# Patient Record
Sex: FEMALE | Race: WHITE | NOT HISPANIC OR LATINO | Employment: FULL TIME | URBAN - METROPOLITAN AREA
[De-identification: names, ages, dates, MRNs, and addresses within clinical notes are randomized per-mention and may not be internally consistent; named-entity substitution may affect disease eponyms.]

---

## 2017-03-21 ENCOUNTER — GENERIC CONVERSION - ENCOUNTER (OUTPATIENT)
Dept: OTHER | Facility: OTHER | Age: 57
End: 2017-03-21

## 2017-03-23 ENCOUNTER — ALLSCRIPTS OFFICE VISIT (OUTPATIENT)
Dept: OTHER | Facility: OTHER | Age: 57
End: 2017-03-23

## 2017-03-23 DIAGNOSIS — Z01.419 ENCOUNTER FOR GYNECOLOGICAL EXAMINATION WITHOUT ABNORMAL FINDING: ICD-10-CM

## 2017-04-20 ENCOUNTER — ALLSCRIPTS OFFICE VISIT (OUTPATIENT)
Dept: OTHER | Facility: OTHER | Age: 57
End: 2017-04-20

## 2017-05-04 ENCOUNTER — GENERIC CONVERSION - ENCOUNTER (OUTPATIENT)
Dept: OTHER | Facility: OTHER | Age: 57
End: 2017-05-04

## 2017-05-11 ENCOUNTER — ALLSCRIPTS OFFICE VISIT (OUTPATIENT)
Dept: OTHER | Facility: OTHER | Age: 57
End: 2017-05-11

## 2017-05-23 ENCOUNTER — ALLSCRIPTS OFFICE VISIT (OUTPATIENT)
Dept: OTHER | Facility: OTHER | Age: 57
End: 2017-05-23

## 2017-06-19 ENCOUNTER — ALLSCRIPTS OFFICE VISIT (OUTPATIENT)
Dept: OTHER | Facility: OTHER | Age: 57
End: 2017-06-19

## 2017-06-20 ENCOUNTER — GENERIC CONVERSION - ENCOUNTER (OUTPATIENT)
Dept: OTHER | Facility: OTHER | Age: 57
End: 2017-06-20

## 2017-07-24 ENCOUNTER — GENERIC CONVERSION - ENCOUNTER (OUTPATIENT)
Dept: OTHER | Facility: OTHER | Age: 57
End: 2017-07-24

## 2017-07-25 ENCOUNTER — GENERIC CONVERSION - ENCOUNTER (OUTPATIENT)
Dept: OTHER | Facility: OTHER | Age: 57
End: 2017-07-25

## 2017-08-09 ENCOUNTER — ALLSCRIPTS OFFICE VISIT (OUTPATIENT)
Dept: OTHER | Facility: OTHER | Age: 57
End: 2017-08-09

## 2017-09-06 ENCOUNTER — ALLSCRIPTS OFFICE VISIT (OUTPATIENT)
Dept: OTHER | Facility: OTHER | Age: 57
End: 2017-09-06

## 2017-10-03 ENCOUNTER — GENERIC CONVERSION - ENCOUNTER (OUTPATIENT)
Dept: OTHER | Facility: OTHER | Age: 57
End: 2017-10-03

## 2017-10-31 ENCOUNTER — GENERIC CONVERSION - ENCOUNTER (OUTPATIENT)
Dept: OTHER | Facility: OTHER | Age: 57
End: 2017-10-31

## 2018-01-10 NOTE — PSYCH
Provider Comments  Provider Comments:   Patient cancelled upcoming appointments  I did call - left voice mail      Message   Recorded as Task   Date: 06/20/2017 01:25 PM, Created By: Ton Doyle   Task Name: Document Appointment   Assigned To: Sonja Winters   Regarding Patient: Senthil Mcwilliams, Status: Active   Comment:    Nafisa Cabezas - 20 Jun 2017 1:25 PM     TASK CREATED  Caller: Self; [phone] (Home); [phone] (Work)  EYAD CALLED 6/20/17@ 12:36 AND CANCELED HER   [email or web address]  SHE DID NOT GIVE A REASON  IT WAS LEFT ON OUR PHONE      NAFISA     Signatures   Electronically signed by : Bita Dickey, ; Jun 20 2017  2:00PM EST                       (Author)

## 2018-01-11 NOTE — PSYCH
Message  Message Free Text Note Form: lm at 3: 10 PM today to cancel the Empowerment Group session for tomorrow at 5 PM with no reason given; Active Problems    1  Anxiety (300 00) (F41 9)   2  Depression (311) (F32 9)   3  Encounter for annual routine gynecological examination (V72 31) (Z01 419)   4  High cholesterol (272 0) (E78 00)    Current Meds   1  Calcium 500 500 MG TABS; Therapy: (Sissy Rubio) to Recorded   2  Citalopram Hydrobromide 40 MG Oral Tablet; Therapy: (Sissy Rubio) to Recorded   3  Iron TABS; Therapy: (Sissy Rubio) to Recorded   4  Multi Vitamin Daily TABS; Therapy: (Sissy Rubio) to Recorded   5  TraZODone HCl - 50 MG Oral Tablet; Therapy: (Sissy Rubio) to Recorded   6  Vitamin D3 1000 UNIT Oral Tablet; Therapy: (Recorded:23Mar2017) to Recorded    Allergies    1   No Known Drug Allergies    Signatures   Electronically signed by : ROSA AzulLCSWMSEDUARDO,AMERICO; Oct  3 2017  4:37PM EST                       (Author)

## 2018-01-11 NOTE — PSYCH
Behavioral Health Outpatient Intake    Referred By: Jerri Lugo  Intake Questions: there are no developmental disabilities  the patient does not have a hearing impairment  the patient does not have an ICM or CTT  patient is not taking injectable psychiatric medications  Employment: The patient is employed full time at [employer]  Emergency Contact Information:   Emergency Contact: Bud Metz   Relationship to Patient: MELISA   Phone Number:   Previous Psychiatric Treatment: She has previously been seen by a psychiatrist  Antonina Boothech  She has previously been seen by a therapist  3 YRS AGO   History: no  service  She has not had combat service  She was not activated into federal active duty as a member of the [employer] or [employer]  Insurance Subscriber: [employer]   Primary Insurance: [employer]   ID number: [id number]   Group number: [id number]         Presenting Problem (in patient's words): LIFE CHANGE, RECENTLY , FAMILY ISSUES  Substance Abuse: NONE  Previous Treatment: The patient has not been seen here in the past      Accepted as Patient   YUMIKO SEVERINO 4/20/17 @ 9:00     Primary Care Physician: DR Junior Score       Signatures   Electronically signed by : Nando Champagne, ; Mar 21 2017  9:19AM EST                       (Author)

## 2018-01-11 NOTE — PSYCH
Treatment Plan Tracking    #1 Treatment Plan not completed within required time limits due to: Client cancelled/ no-showed scheduled appointment            Signatures   Electronically signed by : Real Garvin, ; Jun 20 2017  1:59PM EST                       (Author)
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
## 2018-01-12 NOTE — PSYCH
Message  Message Free Text Note Form: lm today at 8:41 AM to cance all of her Empowerment Group sessions stating she no longer wishes to participate; Active Problems    1  Anxiety (300 00) (F41 9)   2  Depression (311) (F32 9)   3  Encounter for annual routine gynecological examination (V72 31) (Z01 419)   4  High cholesterol (272 0) (E78 00)    Current Meds   1  Calcium 500 500 MG TABS; Therapy: (Macel Knee) to Recorded   2  Citalopram Hydrobromide 40 MG Oral Tablet; Therapy: (Macel Knee) to Recorded   3  Iron TABS; Therapy: (Macel Knee) to Recorded   4  Multi Vitamin Daily TABS; Therapy: (Macel Knee) to Recorded   5  TraZODone HCl - 50 MG Oral Tablet; Therapy: (Macel Knee) to Recorded   6  Vitamin D3 1000 UNIT Oral Tablet; Therapy: (Recorded:23Mar2017) to Recorded    Allergies    1   No Known Drug Allergies    Signatures   Electronically signed by : Ponce Dancer, MSWLCSWMSW,AUSTINW; Oct 31 2017  9:13AM EST                       (Author)

## 2018-01-12 NOTE — PSYCH
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  Still drinking but working on cutting back  We talked about her feelings of "relief" and how she often drinks so that she can let herself feel her feelings  Discussed healthier outlets and patterns for expression of self  A: Seems to understand her need for nurturing however sees this at times as "selfish"  Proud of herself for things she is doing for herself now  P: See PT as prescribed for compressed disc in neck  letter to son, journaling nightly  Time with friends  Permission to nurture self  Pain Scale and Suicide Risk [facility]: Current Pain Assessment: moderate to severe   On a scale of 0 to 10, the patient rates current pain at 4   Current suicide risk is low   Behavioral Health Treatment Plan [facility]: Diagnosis and Treatment Plan explained to patient, patient relates understanding diagnosis and is agreeable to Treatment Plan  Assessment    1   Anxiety (300 00) (F41 9)    Plan  see in 2 weeks     Signatures   Electronically signed by : Tia Lara, ; May 23 2017  9:55AM EST                       (Author)

## 2018-01-12 NOTE — PSYCH
Message   Recorded as Task   Date: 07/24/2017 08:49 AM, Created By: Amanda Arshad   Task Name: Document Appointment   Assigned To:  Chet Wheat   Regarding Patient: Dirk Glover, Status: Active   CommentMacie [facility] - 24 Jul 2017 8:49 AM     TASK CREATED  Caller: Self; Other; [phone] (Home); [phone] (Work)  PT CALLED TO [employer] HER APPT NO REASON WAS GIVING     Signatures   Electronically signed by : Srini Manley, ; Jul 24 2017  9:09AM EST                       (Author)

## 2018-01-14 VITALS
SYSTOLIC BLOOD PRESSURE: 104 MMHG | DIASTOLIC BLOOD PRESSURE: 60 MMHG | HEIGHT: 64 IN | WEIGHT: 164.5 LBS | BODY MASS INDEX: 28.09 KG/M2

## 2018-01-16 NOTE — PSYCH
Progress Note  Psychotherapy Provided [facility]: Group Therapy provided today  Goals addressed in session:   (G#1 ) Charlotte attended her first Women's Group session today  attended the group today  When discussing three things about herself,s he identified being being  for three years following a 32 yr  marriage with three adult sons  She briefly commented on a "difficulty" with her one son and noted her decision to "wait" and have a conversation with him in a few days "to give me time to think about it (the situation between them)  When identifying one thing about herself for which she is more focused on assuming responsibility, she identified expanding her friendship network  A: Eye contact was consistent with the participants and with the group facilitator  She verbally responded to participants' input as well as to the facilitator's input without prompting   She smiled, laughed and became tearful during the session  session  P: G#1) will continue with group participation to help her deal with her identified concern of loneliness;   Pain Scale and Suicide Risk [facility]: On a scale of 0 to 10, the patient rates current pain at 0   Current suicide risk is low   Behavioral Health Treatment Plan [facility]: Diagnosis and Treatment Plan explained to patient, patient relates understanding diagnosis and is agreeable to Treatment Plan  Assessment    1  Anxiety (300 00) (F41 9)   2   Depression (311) (F32 9)    Signatures   Electronically signed by : BRIJESH Cyr,AMERICO; Aug 10 2017  1:55PM EST                       (Author)

## 2018-01-16 NOTE — PSYCH
Progress Note  Psychotherapy Provided [facility]: Group Therapy provided today  Goals addressed in session:   (G# 1 ) Charlotte attended her first session of the Empowerment Group today changing from the Women's Group which meets twice/month vs the Empowerment Group meeting of 1x/month  When asked to share three things about herself, she shared her being in transition with her single life, her exploring job options with her newly acquired graduate degree and her struggle with her denise  When asked to identify something positive in her life, she noted her having a "wonderful summer with her activities including some travel  A: Eye contact was consistent with the participants and with the group facilitator  She verbally responded to participants' input as well as to the facilitator's input without prompting  She smiled during the session  P: G#1) will continue with group participation to help her clarify her direction during her transition in her life as a single person;   Pain Scale and Suicide Risk [facility]: On a scale of 0 to 10, the patient rates current pain at 0   Current suicide risk is low   Behavioral Health Treatment Plan [address] [place] [address] [address]: Diagnosis and Treatment Plan explained to patient, patient relates understanding diagnosis and is agreeable to Treatment Plan  Assessment    1  Anxiety (300 00) (F41 9)   2   Depression (311) (F32 9)    Signatures   Electronically signed by : Ruby Butler, MSWLCSWROSA,AUSTINW; Sep  7 2017  2:11PM EST                       (Author)
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
## 2018-01-17 NOTE — PSYCH
Progress Note  Psychotherapy Provided [facility]: Individual Psychotherapy 30 mins  minutes provided today  Goals addressed in session:   (G# 1)came to session for a 30 mins  "meet and Greet" to discuss her participation in either the Empowerment or the Women's Group  She posed questions about the respective areas of focus in each group, degree of compatibility with group participants and discussed the Group Contract  She signed the Contract (received her own copy) and requested to start the Women's Group effective July 12, 2017  She was encouraged to contact this writer (the Group facilitator) any time with questions; discussed/reviewed group process including roles, group contract, including confidentiality and related matters; A: Mai Felt presents as receptive to her participating in the Group, she requested a three session 'trail period " P: (G#1 ) will attend her first Women's Group session , per her request, July 12, 2017  Pain Scale and Suicide Risk [facility]: On a scale of 0 to 10, the patient rates current pain at 0   Current suicide risk is low   Behavioral Health Treatment Plan [facility]: Diagnosis and Treatment Plan explained to patient, patient relates understanding diagnosis and is agreeable to Treatment Plan  Assessment    1  Anxiety (300 00) (F41 9)   2   Depression (311) (F32 9)    Signatures   Electronically signed by : Jaime Andino, MSWLCSWROSA,AMERICO; Jun 20 2017  3:41PM EST                       (Author)

## 2018-05-01 ENCOUNTER — APPOINTMENT (OUTPATIENT)
Dept: LAB | Age: 58
End: 2018-05-01
Attending: PREVENTIVE MEDICINE

## 2018-05-01 ENCOUNTER — TRANSCRIBE ORDERS (OUTPATIENT)
Dept: ADMINISTRATIVE | Age: 58
End: 2018-05-01

## 2018-05-01 DIAGNOSIS — Z02.1 PRE-EMPLOYMENT HEALTH SCREENING EXAMINATION: Primary | ICD-10-CM

## 2018-05-01 DIAGNOSIS — Z02.1 PRE-EMPLOYMENT HEALTH SCREENING EXAMINATION: ICD-10-CM

## 2018-05-01 LAB
HBV SURFACE AB SER-ACNC: <3.1 MIU/ML
RUBV IGG SERPL IA-ACNC: >175 IU/ML

## 2018-05-01 PROCEDURE — 86787 VARICELLA-ZOSTER ANTIBODY: CPT

## 2018-05-01 PROCEDURE — 86735 MUMPS ANTIBODY: CPT

## 2018-05-01 PROCEDURE — 36415 COLL VENOUS BLD VENIPUNCTURE: CPT

## 2018-05-01 PROCEDURE — 86762 RUBELLA ANTIBODY: CPT

## 2018-05-01 PROCEDURE — 86765 RUBEOLA ANTIBODY: CPT

## 2018-05-01 PROCEDURE — 86706 HEP B SURFACE ANTIBODY: CPT

## 2018-05-01 PROCEDURE — 86480 TB TEST CELL IMMUN MEASURE: CPT

## 2018-05-03 LAB
ANNOTATION COMMENT IMP: NORMAL
GAMMA INTERFERON BACKGROUND BLD IA-ACNC: 0.04 IU/ML
M TB IFN-G BLD-IMP: NEGATIVE
M TB IFN-G CD4+ BCKGRND COR BLD-ACNC: 0.01 IU/ML
M TB IFN-G CD4+ T-CELLS BLD-ACNC: 0.05 IU/ML
MEV IGG SER QL: NORMAL
MITOGEN IGNF BLD-ACNC: >10 IU/ML
MUV IGG SER QL: NORMAL
QUANTIFERON-TB GOLD IN TUBE: NORMAL
SERVICE CMNT-IMP: NORMAL
VZV IGG SER IA-ACNC: NORMAL

## 2018-06-20 ENCOUNTER — TRANSCRIBE ORDERS (OUTPATIENT)
Dept: ADMINISTRATIVE | Age: 58
End: 2018-06-20

## 2018-06-20 ENCOUNTER — APPOINTMENT (OUTPATIENT)
Dept: LAB | Age: 58
End: 2018-06-20

## 2018-06-20 DIAGNOSIS — Z01.84 IMMUNITY STATUS TESTING: Primary | ICD-10-CM

## 2018-06-20 DIAGNOSIS — Z01.84 IMMUNITY STATUS TESTING: ICD-10-CM

## 2018-06-20 PROCEDURE — 86706 HEP B SURFACE ANTIBODY: CPT

## 2018-06-20 PROCEDURE — 36415 COLL VENOUS BLD VENIPUNCTURE: CPT

## 2018-06-21 LAB — HBV SURFACE AB SER-ACNC: 328.06 MIU/ML

## 2018-07-05 ENCOUNTER — OFFICE VISIT (OUTPATIENT)
Dept: FAMILY MEDICINE CLINIC | Facility: CLINIC | Age: 58
End: 2018-07-05
Payer: COMMERCIAL

## 2018-07-05 VITALS
SYSTOLIC BLOOD PRESSURE: 116 MMHG | HEART RATE: 80 BPM | BODY MASS INDEX: 27.9 KG/M2 | HEIGHT: 64 IN | WEIGHT: 163.4 LBS | RESPIRATION RATE: 16 BRPM | DIASTOLIC BLOOD PRESSURE: 74 MMHG

## 2018-07-05 DIAGNOSIS — Z00.00 ENCOUNTER FOR WELL ADULT EXAM WITHOUT ABNORMAL FINDINGS: Primary | ICD-10-CM

## 2018-07-05 DIAGNOSIS — Z11.59 ENCOUNTER FOR HEPATITIS C SCREENING TEST FOR LOW RISK PATIENT: ICD-10-CM

## 2018-07-05 DIAGNOSIS — F33.0 MILD EPISODE OF RECURRENT MAJOR DEPRESSIVE DISORDER (HCC): ICD-10-CM

## 2018-07-05 DIAGNOSIS — Z12.31 VISIT FOR SCREENING MAMMOGRAM: ICD-10-CM

## 2018-07-05 PROBLEM — F41.9 ANXIETY: Status: ACTIVE | Noted: 2017-03-23

## 2018-07-05 PROBLEM — F32.A DEPRESSION: Status: ACTIVE | Noted: 2017-03-23

## 2018-07-05 PROCEDURE — 99386 PREV VISIT NEW AGE 40-64: CPT | Performed by: FAMILY MEDICINE

## 2018-07-05 RX ORDER — CITALOPRAM 40 MG/1
TABLET ORAL
COMMUNITY
End: 2018-07-05 | Stop reason: SDUPTHER

## 2018-07-05 RX ORDER — TRAZODONE HYDROCHLORIDE 50 MG/1
50 TABLET ORAL
Qty: 90 TABLET | Refills: 3 | Status: SHIPPED | OUTPATIENT
Start: 2018-07-05 | End: 2019-10-27 | Stop reason: SDUPTHER

## 2018-07-05 RX ORDER — TRAZODONE HYDROCHLORIDE 50 MG/1
50 TABLET ORAL
COMMUNITY
Start: 2014-01-24 | End: 2018-07-05 | Stop reason: SDUPTHER

## 2018-07-05 RX ORDER — IRON,CARBONYL/ASCORBIC ACID 100-250 MG
TABLET ORAL
COMMUNITY
End: 2020-02-17

## 2018-07-05 RX ORDER — TOCOPHERSOLAN (VITAMIN E TPGS) 400/15ML
LIQUID (ML) ORAL
COMMUNITY
End: 2022-01-12

## 2018-07-05 RX ORDER — CITALOPRAM 40 MG/1
40 TABLET ORAL DAILY
Qty: 90 TABLET | Refills: 3 | Status: SHIPPED | OUTPATIENT
Start: 2018-07-05 | End: 2019-07-09 | Stop reason: SDUPTHER

## 2018-07-05 NOTE — PROGRESS NOTES
Harika Guerra was seen today for new patient physcial and medicaton inquiry  Diagnoses and all orders for this visit:    Encounter for well adult exam without abnormal findings  -     Hemoglobin A1C; Future  -     Lipid Panel with Direct LDL reflex; Future    Visit for screening mammogram  -     Mammo screening bilateral w cad; Future    Encounter for hepatitis C screening test for low risk patient  -     Hepatitis C Qualitative by PCR; Future    Mild episode of recurrent major depressive disorder (HCC)  -     traZODone (DESYREL) 50 mg tablet; Take 1 tablet (50 mg total) by mouth daily at bedtime  -     citalopram (CeleXA) 40 mg tablet; Take 1 tablet (40 mg total) by mouth daily      Patient Counseling:  --Nutrition: Stressed importance of moderation in sodium/caffeine intake, saturated fat and cholesterol, caloric balance, sufficient intake of fresh fruits, vegetables, fiber, calcium, iron, and 1 mg of folate supplement per day   --Discussed  calcium supplement, and the daily use of baby aspirin  --Exercise: Stressed the importance of regular exercise  --Substance Abuse: Discussed cessation/primary prevention of tobacco, alcohol, or other drug use; driving or other dangerous activities under the influence; availability of treatment for abuse  --Sexuality: Discussed sexually transmitted diseases, partner selection, use of condoms, avoidance of unintended pregnancy  and contraceptive alternatives  --Injury prevention: Discussed safety belts, safety helmets, smoke detector, smoking near bedding or upholstery  --Dental health: Discussed importance of regular tooth brushing, flossing, and dental visits  --Immunizations reviewed    --Discussed benefits of screening colonoscopy    Chief Complaint   Patient presents with    new patient physcial    medicaton inquiry       HPI    Patient here for a comprehensive physical exam The patient reports no problems    Do you take any herbs or supplements that were not prescribed by a doctor? no   Are you taking calcium supplements? no   Are you taking aspirin daily? no  How often do you exercise? Diet? 2-3 servings of fruits and vegetables   Dental visit:  Yearly     Vision test: wears glasses   1 year ago     Colonoscopy:  Last year    History:  LMP: No LMP recorded  post menopausal     Last pap date: last year   Abnormal pap? no  :  3  Para: 3          History   Sexual Activity    Sexual activity: Not Currently           Review of Systems   Constitutional: Negative  HENT: Negative  Eyes: Negative  Respiratory: Negative  Cardiovascular: Negative  Gastrointestinal: Negative  Endocrine: Negative  Genitourinary: Negative  Musculoskeletal: Negative  Skin: Negative  Allergic/Immunologic: Negative  Neurological: Negative  Hematological: Negative  Psychiatric/Behavioral: Negative  Family History   Problem Relation Age of Onset   Giovanna Boothe Arthritis Mother     Hypertension Mother     Scoliosis Mother     Hyperlipidemia Mother     Arthritis Father     Heart disease Father         CARDIAC DISORDER    Ovarian cancer Maternal Aunt        Past Medical History:   Diagnosis Date    Known health problems: none          Social History     Social History    Marital status:      Spouse name: N/A    Number of children: 3    Years of education: N/A     Occupational History          FULL-TIME EMPLOYMENT     Social History Main Topics    Smoking status: Former Smoker    Smokeless tobacco: Never Used    Alcohol use Yes      Comment: ACUTE    Drug use: No    Sexual activity: Not Currently     Other Topics Concern    Not on file     Social History Narrative    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, 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    EXERCISES 3 TO 4 TIMES PER WEEK           No current outpatient prescriptions on file prior to visit  No current facility-administered medications on file prior to visit            Allergies   Allergen Reactions  Benzoin     Monistat [Tioconazole] Hives         Vitals:    07/05/18 1202   BP: 116/74   BP Location: Left arm   Patient Position: Sitting   Cuff Size: Standard   Pulse: 80   Resp: 16   Weight: 74 1 kg (163 lb 6 4 oz)   Height: 5' 4" (1 626 m)         Physical Exam   Constitutional: She is oriented to person, place, and time  Vital signs are normal  She appears well-developed and well-nourished  HENT:   Head: Normocephalic and atraumatic  Nose: Nose normal    Mouth/Throat: Oropharynx is clear and moist    Eyes: Pupils are equal, round, and reactive to light  Neck: Normal range of motion  Neck supple  No thyromegaly present  Cardiovascular: Normal rate and regular rhythm  No murmur heard  Pulmonary/Chest: Effort normal and breath sounds normal    Abdominal: Soft  Bowel sounds are normal    Musculoskeletal: Normal range of motion  She exhibits no edema or deformity  Neurological: She is alert and oriented to person, place, and time  She has normal reflexes  Skin: Skin is warm  No rash noted  No erythema  Psychiatric: She has a normal mood and affect   Her behavior is normal

## 2018-08-15 ENCOUNTER — TRANSCRIBE ORDERS (OUTPATIENT)
Dept: ADMINISTRATIVE | Age: 58
End: 2018-08-15

## 2018-08-15 ENCOUNTER — APPOINTMENT (OUTPATIENT)
Dept: LAB | Age: 58
End: 2018-08-15
Payer: COMMERCIAL

## 2018-08-15 DIAGNOSIS — Z00.00 ENCOUNTER FOR WELL ADULT EXAM WITHOUT ABNORMAL FINDINGS: ICD-10-CM

## 2018-08-15 DIAGNOSIS — Z11.59 ENCOUNTER FOR HEPATITIS C SCREENING TEST FOR LOW RISK PATIENT: ICD-10-CM

## 2018-08-15 LAB
CHOLEST SERPL-MCNC: 269 MG/DL (ref 50–200)
EST. AVERAGE GLUCOSE BLD GHB EST-MCNC: 105 MG/DL
HBA1C MFR BLD: 5.3 % (ref 4.2–6.3)
HDLC SERPL-MCNC: 95 MG/DL (ref 40–60)
LDLC SERPL CALC-MCNC: 162 MG/DL (ref 0–100)
TRIGL SERPL-MCNC: 62 MG/DL

## 2018-08-15 PROCEDURE — 87521 HEPATITIS C PROBE&RVRS TRNSC: CPT

## 2018-08-15 PROCEDURE — 80061 LIPID PANEL: CPT

## 2018-08-15 PROCEDURE — 36415 COLL VENOUS BLD VENIPUNCTURE: CPT

## 2018-08-15 PROCEDURE — 83036 HEMOGLOBIN GLYCOSYLATED A1C: CPT

## 2018-08-20 LAB — HCV RNA SERPL QL NAA+PROBE: NEGATIVE

## 2018-09-10 RX ORDER — POLYDIMETHYLSILOXANES/SILICON
GEL (GRAM) TOPICAL
Refills: 3 | COMMUNITY
Start: 2018-07-13 | End: 2020-02-17

## 2018-09-11 ENCOUNTER — OFFICE VISIT (OUTPATIENT)
Dept: FAMILY MEDICINE CLINIC | Facility: CLINIC | Age: 58
End: 2018-09-11
Payer: COMMERCIAL

## 2018-09-11 ENCOUNTER — APPOINTMENT (OUTPATIENT)
Dept: RADIOLOGY | Facility: CLINIC | Age: 58
End: 2018-09-11
Payer: COMMERCIAL

## 2018-09-11 VITALS
RESPIRATION RATE: 20 BRPM | WEIGHT: 164.6 LBS | OXYGEN SATURATION: 98 % | BODY MASS INDEX: 28.1 KG/M2 | DIASTOLIC BLOOD PRESSURE: 64 MMHG | SYSTOLIC BLOOD PRESSURE: 110 MMHG | HEIGHT: 64 IN | TEMPERATURE: 98 F | HEART RATE: 69 BPM

## 2018-09-11 DIAGNOSIS — M25.552 LEFT HIP PAIN: Primary | ICD-10-CM

## 2018-09-11 DIAGNOSIS — M25.552 LEFT HIP PAIN: ICD-10-CM

## 2018-09-11 PROCEDURE — 73502 X-RAY EXAM HIP UNI 2-3 VIEWS: CPT

## 2018-09-11 PROCEDURE — 99213 OFFICE O/P EST LOW 20 MIN: CPT | Performed by: FAMILY MEDICINE

## 2018-09-11 PROCEDURE — 3008F BODY MASS INDEX DOCD: CPT | Performed by: FAMILY MEDICINE

## 2018-09-11 RX ORDER — NAPROXEN 500 MG/1
500 TABLET ORAL 2 TIMES DAILY WITH MEALS
Qty: 60 TABLET | Refills: 0 | Status: SHIPPED | OUTPATIENT
Start: 2018-09-11 | End: 2018-10-31 | Stop reason: SDUPTHER

## 2018-09-11 NOTE — PROGRESS NOTES
Assessment/Plan:         Diagnoses and all orders for this visit:    Left hip pain  -     Ambulatory referral to Orthopedic Surgery  -     Ambulatory referral to Physical Therapy  -     XR hip/pelv 2-3 vws left if performed; Future  -     naproxen (NAPROSYN) 500 mg tablet; Take 1 tablet (500 mg total) by mouth 2 (two) times a day with meals    Other orders  -     Scar Treatment Products (RECEDO) GEL; APPLY TWICE A DAY TO SCAR AS NEEDED          Subjective:      Patient ID: Chong Hicks is a 62 y o  female  Hip Pain    The incident occurred more than 1 week ago  There was no injury mechanism  The pain is present in the left hip  The pain is at a severity of 2/10  The pain is mild  The pain has been intermittent since onset  Pertinent negatives include no inability to bear weight, loss of motion, loss of sensation, muscle weakness, numbness or tingling  She reports no foreign bodies present  The symptoms are aggravated by movement  She has tried rest and NSAIDs (physical therapy) for the symptoms  The treatment provided mild relief  cannot ride her bike     The following portions of the patient's history were reviewed and updated as appropriate: allergies, current medications, past family history, past medical history, past social history, past surgical history and problem list     Review of Systems   Constitutional: Negative  HENT: Negative  Respiratory: Negative  Cardiovascular: Negative  Endocrine: Negative  Musculoskeletal: Positive for arthralgias and joint swelling  Neurological: Negative for tingling and numbness               Past Medical History:   Diagnosis Date    Known health problems: none      Past Surgical History:   Procedure Laterality Date     SECTION       SECTION      SHOULDER ARTHROPLASTY Right     1990    SHOULDER SURGERY      WISDOM TOOTH EXTRACTION       Social History     Social History    Marital status:      Spouse name: N/A    Number of children: 3    Years of education: N/A     Occupational History          FULL-TIME EMPLOYMENT     Social History Main Topics    Smoking status: Former Smoker    Smokeless tobacco: Never Used    Alcohol use Yes      Comment: ACUTE    Drug use: No    Sexual activity: Not Currently     Other Topics Concern    Not on file     Social History Narrative    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, 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    EXERCISES 3 TO 4 TIMES PER WEEK         Allergies   Allergen Reactions    Benzoin     Monistat [Tioconazole] Hives         Family History   Problem Relation Age of Onset    Arthritis Mother     Hypertension Mother     Scoliosis Mother     Hyperlipidemia Mother     Arthritis Father     Heart disease Father         CARDIAC DISORDER    Ovarian cancer Maternal Aunt            Current Outpatient Prescriptions:     Calcium-Magnesium-Vitamin D (CALCIUM 500) 500-250-200 MG-MG-UNIT TABS, Take by mouth, Disp: , Rfl:     citalopram (CeleXA) 40 mg tablet, Take 1 tablet (40 mg total) by mouth daily, Disp: 90 tablet, Rfl: 3    Iron-Vitamin C (IRON 100/C) 100-250 MG TABS, Take by mouth, Disp: , Rfl:     Multiple Vitamin (MULTI-VITAMIN DAILY PO), , Disp: , Rfl:     Scar Treatment Products (RECEDO) GEL, APPLY TWICE A DAY TO SCAR AS NEEDED, Disp: , Rfl: 3    traZODone (DESYREL) 50 mg tablet, Take 1 tablet (50 mg total) by mouth daily at bedtime, Disp: 90 tablet, Rfl: 3    naproxen (NAPROSYN) 500 mg tablet, Take 1 tablet (500 mg total) by mouth 2 (two) times a day with meals, Disp: 60 tablet, Rfl: 0      Objective:      /64 (BP Location: Left arm, Patient Position: Sitting, Cuff Size: Standard)   Pulse 69   Temp 98 °F (36 7 °C)   Resp 20   Ht 5' 4" (1 626 m)   Wt 74 7 kg (164 lb 9 6 oz)   SpO2 98%   BMI 28 25 kg/m²          Physical Exam   Constitutional: She is oriented to person, place, and time  She appears well-developed and well-nourished  HENT:   Head: Normocephalic and atraumatic  Cardiovascular: Normal rate and regular rhythm  Pulmonary/Chest: Effort normal and breath sounds normal    Musculoskeletal: She exhibits tenderness  Left hip    Neurological: She is alert and oriented to person, place, and time  Psychiatric: She has a normal mood and affect   Her behavior is normal

## 2018-09-20 ENCOUNTER — EVALUATION (OUTPATIENT)
Dept: PHYSICAL THERAPY | Facility: CLINIC | Age: 58
End: 2018-09-20
Payer: COMMERCIAL

## 2018-09-20 DIAGNOSIS — M25.552 LEFT HIP PAIN: Primary | ICD-10-CM

## 2018-09-20 PROCEDURE — G8990 OTHER PT/OT CURRENT STATUS: HCPCS | Performed by: PHYSICAL THERAPIST

## 2018-09-20 PROCEDURE — 97110 THERAPEUTIC EXERCISES: CPT | Performed by: PHYSICAL THERAPIST

## 2018-09-20 PROCEDURE — 97162 PT EVAL MOD COMPLEX 30 MIN: CPT | Performed by: PHYSICAL THERAPIST

## 2018-09-20 PROCEDURE — G8991 OTHER PT/OT GOAL STATUS: HCPCS | Performed by: PHYSICAL THERAPIST

## 2018-09-20 NOTE — PROGRESS NOTES
PT Evaluation     Today's date: 2018  Patient name: Vickie Leong  : 1960  MRN: [id number]  Referring provider: Victoriano Serrano MD  Dx:   Encounter Diagnosis     ICD-10-CM    1  Left hip pain M25 552                   Assessment  Impairments: abnormal coordination, abnormal gait, abnormal muscle firing, abnormal muscle tone, abnormal or restricted ROM, abnormal movement, activity intolerance, impaired balance, impaired physical strength, lacks appropriate home exercise program, pain with function, safety issue, weight-bearing intolerance and poor posture   Patient presents with symptom irritability yes  Assessment details: Vickie Leong is an alert and oriented 62 y o  female who presents with pain, decreased strength, ambulatory dysfunction, postural  Dysfunction and SIJ dysfunction  Due to these impairments, Patient has difficulty performing a/iadls, recreational activities, work-related activities and engaging in social activities  Patient's clinical presentation is consistent with their referring diagnosis  Patient would benefit from skilled physical therapy to address their aforementioned impairments, improve their level of function and to improve their overall quality of life  Understanding of Dx/Px/POC: good   Prognosis: good  Prognosis details: (+) prognostic factors- positive attitude towards recovery, positive response to PT in the past  (-) prognostic factors- PMH of anxiety, depression, HTN, length of symptoms     Goals  Short Term Goals: to be achieved in 4 weeks  1) Patient to be independent with basic HEP  2) Decrease pain at it's worst to 5/10 on VAS  3) Increase LE strength by 1/2 MMT grade in all deficient planes  4) Patient to report decreased sleep interruption secondary to pain  5) Increase ambulatory tolerance to 40 minutes  Long Term Goals: to be achieved by discharge  1) FOTO equal to or greater than 62   2) Patient to be independent with comprehensive HEP    3) Abolish pain for improved quality of life  4) Increase LE strength to 5/5 MMT grade in all planes to improve A/IADLS  5) Increase ambulatory tolerance to 60 minutes  6) Patient to report no sleep interruption secondary to pain  Plan  Patient would benefit from: skilled PT  Referral necessary: No  Planned modality interventions: biofeedback, cryotherapy, hydrotherapy, TENS and unattended electrical stimulation  Planned therapy interventions: activity modification, ADL retraining, balance, balance/weight bearing training, behavior modification, body mechanics training, functional ROM exercises, gait training, home exercise program, IADL retraining, joint mobilization, manual therapy, massage, neuromuscular re-education, patient education, strengthening, stretching, therapeutic activities, therapeutic exercise, transfer training, abdominal trunk stabilization, graded exercise, graded activity and postural training  Frequency: 2x week  Duration in weeks: 6  Plan of Care beginning date: 2018  Plan of Care expiration date: 2018  Treatment plan discussed with: patient        Subjective Evaluation    History of Present Illness  Mechanism of injury: Pt notes that she was playing tennis when she heard a pop  She notes that she continued playing tennis, and then the next day was in a lot of pain  She had PT for this approximately 1 year ago, and was still limited with her hip ROM  She is limited with lifting her leg up and over to get onto the bike  She notes that this has persisted  She has been taking naproxen to help with the pain  She notes a flare up when she plays tennis, rides bike, or donning/doffing a shoe, and prolonged walking  She also reports an instance of giving way when she is doing day to day activities approximately 1x/week  She does report intermittent sleep disruption     Recurrent probem    Quality of life: good    Pain  Current pain ratin  At best pain ratin  At worst pain rating: 7  Location: anterior hip   Quality: sharp (piercing )  Relieving factors: medications    Patient Goals  Patient goal: Pt would like to no longer have hip pain        Objective     Lumbar Screen  Lumbar range of motion within normal limits with the following exceptions:No pain reproduction with active lumbar ROM    L ASIS, PSIS and iliac crest elevated in static stance     Strength/Myotome Testing     Left Hip   Planes of Motion   Flexion: 4-  Extension: 3+  Abduction: 3+  External rotation: 3+  Internal rotation: 4-    Right Hip   Planes of Motion   Flexion: 5  Extension: 4-  Abduction: 4-    Additional Strength Details  Hip mononeural firing- positive dysfunction, lumbar spine rotation and HS dominant     Tests     Left Hip   Positive IDALMIS, FADIR and Gaenslen's  Negative Danna, piriformis, SI compression and SI distraction  Right Hip   Negative IDALMIS, FADIR, Danna, piriformis, scour, SI compression and SI distraction       Additional Tests Details  (+) PKB- relative L anterior rotation  Limited hip extension PROM- approximately 10 degrees kalpana     Psoas palpation- muscle spasm and pain reproduction     Step up- valgus kalpana  SLS- (+) trendelenburg  Step downs- valgus kalpana     Gait- decreased stance time on L LE       Flowsheet Rows      Most Recent Value   PT/OT G-Codes   Current Score  52   Projected Score  62   Assessment Type  Evaluation   G code set  Other PT/OT Primary   Other PT Primary Current Status ()  CK   Other PT Primary Goal Status ()  CJ          Precautions: anxiety, depression, HTN    Daily Treatment Diary     Manual  9/20            MET for L ant SIJ rotation                                                                     Exercise Diary  9/20            bike             PPT             Bridges             clamshells             Reverse clamshells             Side stepping             Step ups             Step downs             Eccentric lateral step ups             SLS Mini squats                                                                                                                                      Modalities

## 2018-09-25 ENCOUNTER — OFFICE VISIT (OUTPATIENT)
Dept: OBGYN CLINIC | Facility: CLINIC | Age: 58
End: 2018-09-25
Payer: COMMERCIAL

## 2018-09-25 ENCOUNTER — OFFICE VISIT (OUTPATIENT)
Dept: PHYSICAL THERAPY | Facility: CLINIC | Age: 58
End: 2018-09-25
Payer: COMMERCIAL

## 2018-09-25 VITALS — DIASTOLIC BLOOD PRESSURE: 73 MMHG | HEART RATE: 67 BPM | SYSTOLIC BLOOD PRESSURE: 112 MMHG

## 2018-09-25 DIAGNOSIS — M25.552 LEFT HIP PAIN: Primary | ICD-10-CM

## 2018-09-25 DIAGNOSIS — M25.552 PAIN IN LEFT HIP: Primary | ICD-10-CM

## 2018-09-25 PROCEDURE — 97110 THERAPEUTIC EXERCISES: CPT

## 2018-09-25 PROCEDURE — G8991 OTHER PT/OT GOAL STATUS: HCPCS | Performed by: PHYSICAL THERAPIST

## 2018-09-25 PROCEDURE — 97112 NEUROMUSCULAR REEDUCATION: CPT

## 2018-09-25 PROCEDURE — 99203 OFFICE O/P NEW LOW 30 MIN: CPT | Performed by: ORTHOPAEDIC SURGERY

## 2018-09-25 PROCEDURE — G8992 OTHER PT/OT  D/C STATUS: HCPCS | Performed by: PHYSICAL THERAPIST

## 2018-09-25 NOTE — PROGRESS NOTES
Patient Name:  Delmi Castellano  MRN:  [id number]    Assessment & Plan    Ongoing left hip pain despite conservative measures including anti-inflammatories and physical therapy, concern for possible labral pathology  1  MR arthrogram left hip for further evaluation  2  Continue naproxen and activity modification as needed  3  Follow-up after MRI arthrogram       Chief Complaint    Left hip pain      History of the Present Illness    80-year-old female reports to the office today for evaluation of her left hip  Patient notes an onset of left hip pain in May of 2017  While playing tennis she felt a pop in her left hip and noted pain  Pain was localized to the groin  Pain is worse with increased activities  She did complete physical therapy for this with mild improvement  She saw her primary care physician as well who prescribed naproxen which also improves her pain  She notes associated weakness and instability  No numbness or tingling  No fevers or chills  Physical Exam    /73   Pulse 67     Left hip:  No gross deformity  No tenderness to palpation anterior hip and greater trochanter  Hip range of motion is intact and nearly full in all planes with discomfort noted with internal and external rotation  Positive FADDIR and IDALMIS test    Negative logroll test   Negative straight leg raise and resisted straight leg raise test   Sensation intact left lower extremity  Constitutional:  Well-developed and well-nourished  Eyes:  Anicteric sclerae  Neck:  Supple  Lungs:  Unlabored breathing  Cardiovascular:  Capillary refill is less than 2 seconds  Skin:  Intact without erythema  Neurologic:  Sensation intact to light touch  Psychiatric:  Mood and affect are appropriate  Data Review    I have personally reviewed pertinent films in PACS, and my interpretation follows  X-rays recently performed of the left hip reveals minimal degenerative changes        Past Medical History:   Diagnosis Date  Known health problems: none        Past Surgical History:   Procedure Laterality Date     SECTION       SECTION  1994    SHOULDER ARTHROPLASTY Right     1990    SHOULDER SURGERY      WISDOM TOOTH EXTRACTION         Allergies   Allergen Reactions    Benzoin     Meloxicam Other (See Comments)     Reddness    Monistat [Tioconazole] Hives       Current Outpatient Prescriptions on File Prior to Visit   Medication Sig Dispense Refill    Calcium-Magnesium-Vitamin D (CALCIUM 500) 500-250-200 MG-MG-UNIT TABS Take by mouth      citalopram (CeleXA) 40 mg tablet Take 1 tablet (40 mg total) by mouth daily 90 tablet 3    Iron-Vitamin C (IRON 100/C) 100-250 MG TABS Take by mouth      Multiple Vitamin (MULTI-VITAMIN DAILY PO)       naproxen (NAPROSYN) 500 mg tablet Take 1 tablet (500 mg total) by mouth 2 (two) times a day with meals 60 tablet 0    Scar Treatment Products (RECEDO) GEL APPLY TWICE A DAY TO SCAR AS NEEDED  3    traZODone (DESYREL) 50 mg tablet Take 1 tablet (50 mg total) by mouth daily at bedtime 90 tablet 3     No current facility-administered medications on file prior to visit  Social History   Substance Use Topics    Smoking status: Former Smoker    Smokeless tobacco: Never Used    Alcohol use Yes      Comment: ACUTE       Family History   Problem Relation Age of Onset    Arthritis Mother     Hypertension Mother     Scoliosis Mother     Hyperlipidemia Mother     Arthritis Father     Heart disease Father         CARDIAC DISORDER    Ovarian cancer Maternal Aunt          Review of Systems    General:  Negative for fever, lethargy/malaise, or night sweats  Eyes:  Negative for blurry vision or double vision  ENT:  Negative for hearing change, nasal discharge, or sore throat  Hematological:  Negative for bleeding problems or blood clots  Endocrine:  Negative for excessive thirst or temperature intolerance    Respiratory:  Negative for cough or wheezing  Cardiovascular:  Negative for chest pain, dyspnea on exertion, or palpitations  Gastrointestinal:  Negative for abdominal pain, diarrhea, or nausea/vomiting  Musculoskeletal:  As stated in the HPI and otherwise negative  Neurological:  Negative for confusion, headaches, or seizures  Psychological:  Negative for hallucinations or mood swings  Dermatological:  Negative for itching or rash        Scribe Attestation    I,:   Max Hughes PA-C am acting as a scribe while in the presence of the attending physician :        I,:   Ravi Isaac MD personally performed the services described in this documentation    as scribed in my presence :

## 2018-09-25 NOTE — PROGRESS NOTES
Daily Note     Today's date: 2018  Patient name: Jon Egan  : 1960  MRN: [id number]  Referring provider: Florina Huang MD  Dx:   Encounter Diagnosis     ICD-10-CM    1  Left hip pain M25 552                   Subjective: patient has trialed HEP at home with some discomfort with s/l clamshells  Patient performed today in a supine position with better tolerance  Objective: See treatment diary below  Precautions: anxiety, depression, HTN    Daily Treatment Diary     Manual             MET for L ant SIJ rotation  5 - AR                                                                   Exercise Diary             bike  pain           PPT  x10           Bridges  X 20            Clamshells hooklying  GTB x 15           Clamshells lvl 2   X 10            Reverse clamshells  X 15            Side stepping  RTB x 5 laps           Step ups  6" x 20            Step downs             Eccentric lateral step ups  6" x 20            SLS   :10x10           Mini squats  RTB x 15            Treadmill  5 min                                                                                                                        Modalities                                                             Assessment: Tolerated treatment fair  Patient exhibited good technique with therapeutic exercises      Plan: Continue per plan of care

## 2018-09-27 ENCOUNTER — APPOINTMENT (OUTPATIENT)
Dept: PHYSICAL THERAPY | Facility: CLINIC | Age: 58
End: 2018-09-27
Payer: COMMERCIAL

## 2018-10-09 ENCOUNTER — HOSPITAL ENCOUNTER (OUTPATIENT)
Dept: RADIOLOGY | Facility: HOSPITAL | Age: 58
Discharge: HOME | End: 2018-10-09

## 2018-10-10 NOTE — PROGRESS NOTES
10/10/18- Pt was placed on hold until receiving an MRI, and then follow up with MD prior to additional therapy  She did not follow up and at this time, will be discharged from skilled care

## 2018-10-24 ENCOUNTER — HOSPITAL ENCOUNTER (OUTPATIENT)
Dept: RADIOLOGY | Facility: HOSPITAL | Age: 58
Discharge: HOME | End: 2018-10-24
Payer: COMMERCIAL

## 2018-10-24 ENCOUNTER — HOSPITAL ENCOUNTER (OUTPATIENT)
Dept: MRI IMAGING | Facility: HOSPITAL | Age: 58
Discharge: HOME | End: 2018-10-24
Payer: COMMERCIAL

## 2018-10-24 DIAGNOSIS — M25.552 PAIN IN LEFT HIP: ICD-10-CM

## 2018-10-24 PROCEDURE — A9585 GADOBUTROL INJECTION: HCPCS | Performed by: PHYSICIAN ASSISTANT

## 2018-10-24 PROCEDURE — 77002 NEEDLE LOCALIZATION BY XRAY: CPT

## 2018-10-24 PROCEDURE — 27095 INJECTION FOR HIP X-RAY: CPT

## 2018-10-24 PROCEDURE — 73722 MRI JOINT OF LWR EXTR W/DYE: CPT

## 2018-10-24 RX ORDER — 0.9 % SODIUM CHLORIDE 0.9 %
12 VIAL (ML) INJECTION
Status: DISCONTINUED | OUTPATIENT
Start: 2018-10-24 | End: 2018-10-25 | Stop reason: HOSPADM

## 2018-10-24 RX ORDER — LIDOCAINE HYDROCHLORIDE 10 MG/ML
5 INJECTION, SOLUTION INFILTRATION; PERINEURAL
Status: DISCONTINUED | OUTPATIENT
Start: 2018-10-24 | End: 2018-10-25 | Stop reason: HOSPADM

## 2018-10-24 RX ADMIN — IOHEXOL 4 ML: 300 INJECTION, SOLUTION INTRAVENOUS at 11:05

## 2018-10-24 RX ADMIN — GADOBUTROL 0.2 ML: 604.72 INJECTION INTRAVENOUS at 11:05

## 2018-10-31 ENCOUNTER — OFFICE VISIT (OUTPATIENT)
Dept: OBGYN CLINIC | Facility: CLINIC | Age: 58
End: 2018-10-31
Payer: COMMERCIAL

## 2018-10-31 VITALS
SYSTOLIC BLOOD PRESSURE: 125 MMHG | DIASTOLIC BLOOD PRESSURE: 79 MMHG | WEIGHT: 164 LBS | HEIGHT: 64 IN | HEART RATE: 65 BPM | BODY MASS INDEX: 28 KG/M2

## 2018-10-31 DIAGNOSIS — M16.12 PRIMARY LOCALIZED OSTEOARTHRITIS OF LEFT HIP: Primary | ICD-10-CM

## 2018-10-31 DIAGNOSIS — M25.552 LEFT HIP PAIN: ICD-10-CM

## 2018-10-31 PROBLEM — M16.11 PRIMARY OSTEOARTHRITIS OF ONE HIP, RIGHT: Status: ACTIVE | Noted: 2018-10-31

## 2018-10-31 PROCEDURE — 99213 OFFICE O/P EST LOW 20 MIN: CPT | Performed by: ORTHOPAEDIC SURGERY

## 2018-10-31 RX ORDER — NAPROXEN 500 MG/1
500 TABLET ORAL 2 TIMES DAILY WITH MEALS
Qty: 60 TABLET | Refills: 1 | Status: SHIPPED | OUTPATIENT
Start: 2018-10-31 | End: 2019-02-13

## 2018-10-31 RX ORDER — KETOCONAZOLE 20 MG/G
CREAM TOPICAL
COMMUNITY
Start: 2018-08-29 | End: 2019-02-13

## 2018-10-31 NOTE — PROGRESS NOTES
Patient Name:  Cb England  MRN:  [id number]    Assessment     1  Primary localized osteoarthritis of left hip     2  Left hip pain  naproxen (NAPROSYN) 500 mg tablet       Plan     Left hip DJD  1  Referral provided for intra-articular left hip cortisone injection by Radiology  2  Refill of naproxen provided  3  Activities as tolerated with modification to avoid pain  4  Follow-up as needed  Subjective     68-year-old female returns to the office today for follow-up regarding her left hip  She recently underwent an MR arthrogram of the left hip and is here to review the results  She notes persistent pain in the left hip specifically in the groin  She notes stiffness and locking up  Pain is worse with a lot activities including prolonged walking  She takes naproxen which does provide relief  She notes weakness secondary to pain  No numbness or tingling  No fevers or chills  General ROS:  Negative for fever, lethargy/malaise, or night sweats  Neurological ROS:  Negative for confusion or seizures  Objective     /79   Pulse 65   Ht 5' 4" (1 626 m)   Wt 74 4 kg (164 lb)   BMI 28 15 kg/m²     Left hip:  No gross deformity  No tenderness to palpation anterior hip and greater trochanter  Hip range of motion is intact and nearly full in all planes with limitations noted with internal rotation secondary to pain  Positive FADDIR and IDALMIS test    Negative logroll test   Negative straight leg raise and resisted straight leg raise test   Sensation intact left lower extremity  Skin warm and well perfused  Data Review     I have personally reviewed pertinent films in PACS, and my interpretation follows  MR arthrogram left hip reveals mild to moderate degenerative changes with associated degenerative tear of the anterior labrum        Scribe Attestation    I,:   Romina Grayson PA-C am acting as a scribe while in the presence of the attending physician :        I,:   Kai Epperson MD personally performed the services described in this documentation    as scribed in my presence :

## 2018-11-01 ENCOUNTER — TELEPHONE (OUTPATIENT)
Dept: OBGYN CLINIC | Facility: CLINIC | Age: 58
End: 2018-11-01

## 2018-11-01 DIAGNOSIS — M16.12 PRIMARY LOCALIZED OSTEOARTHRITIS OF LEFT HIP: Primary | ICD-10-CM

## 2018-11-01 NOTE — TELEPHONE ENCOUNTER
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

## 2018-11-07 ENCOUNTER — HOSPITAL ENCOUNTER (OUTPATIENT)
Dept: RADIOLOGY | Age: 58
Discharge: HOME | End: 2018-11-07
Payer: COMMERCIAL

## 2018-11-07 DIAGNOSIS — Z12.31 VISIT FOR SCREENING MAMMOGRAM: ICD-10-CM

## 2018-11-07 PROCEDURE — 77067 SCR MAMMO BI INCL CAD: CPT

## 2018-11-08 ENCOUNTER — TRANSCRIBE ORDERS (OUTPATIENT)
Dept: RADIOLOGY | Facility: HOSPITAL | Age: 58
End: 2018-11-08

## 2018-11-08 ENCOUNTER — HOSPITAL ENCOUNTER (OUTPATIENT)
Dept: RADIOLOGY | Facility: HOSPITAL | Age: 58
Discharge: HOME | End: 2018-11-08
Admitting: RADIOLOGY
Payer: COMMERCIAL

## 2018-11-08 DIAGNOSIS — M16.12 PRIMARY LOCALIZED OSTEOARTHRITIS OF LEFT HIP: ICD-10-CM

## 2018-11-08 PROCEDURE — 77002 NEEDLE LOCALIZATION BY XRAY: CPT

## 2018-11-08 PROCEDURE — 20610 DRAIN/INJ JOINT/BURSA W/O US: CPT

## 2018-11-08 RX ORDER — LIDOCAINE HYDROCHLORIDE 10 MG/ML
20 INJECTION, SOLUTION EPIDURAL; INFILTRATION; INTRACAUDAL; PERINEURAL ONCE
Status: COMPLETED | OUTPATIENT
Start: 2018-11-08 | End: 2018-11-08

## 2018-11-08 RX ORDER — BUPIVACAINE HYDROCHLORIDE 2.5 MG/ML
10 INJECTION, SOLUTION EPIDURAL; INFILTRATION; INTRACAUDAL ONCE
Status: COMPLETED | OUTPATIENT
Start: 2018-11-08 | End: 2018-11-08

## 2018-11-08 RX ORDER — METHYLPREDNISOLONE ACETATE 80 MG/ML
80 INJECTION, SUSPENSION INTRA-ARTICULAR; INTRALESIONAL; INTRAMUSCULAR; SOFT TISSUE ONCE
Status: COMPLETED | OUTPATIENT
Start: 2018-11-08 | End: 2018-11-08

## 2018-11-08 RX ADMIN — IOHEXOL 2 ML: 300 INJECTION, SOLUTION INTRAVENOUS at 13:44

## 2018-11-08 RX ADMIN — LIDOCAINE HYDROCHLORIDE 5 ML: 10 INJECTION, SOLUTION EPIDURAL; INFILTRATION; INTRACAUDAL; PERINEURAL at 13:46

## 2018-11-08 RX ADMIN — BUPIVACAINE HYDROCHLORIDE 2 ML: 2.5 INJECTION, SOLUTION EPIDURAL; INFILTRATION; INTRACAUDAL at 13:45

## 2018-11-08 RX ADMIN — METHYLPREDNISOLONE ACETATE 80 MG: 80 INJECTION, SUSPENSION INTRA-ARTICULAR; INTRALESIONAL; INTRAMUSCULAR; SOFT TISSUE at 13:46

## 2018-11-28 ENCOUNTER — EVALUATION (OUTPATIENT)
Dept: PHYSICAL THERAPY | Facility: CLINIC | Age: 58
End: 2018-11-28
Payer: COMMERCIAL

## 2018-11-28 DIAGNOSIS — M16.12 PRIMARY LOCALIZED OSTEOARTHRITIS OF LEFT HIP: ICD-10-CM

## 2018-11-28 PROCEDURE — 97110 THERAPEUTIC EXERCISES: CPT | Performed by: PHYSICAL THERAPIST

## 2018-11-28 PROCEDURE — G8991 OTHER PT/OT GOAL STATUS: HCPCS | Performed by: PHYSICAL THERAPIST

## 2018-11-28 PROCEDURE — 97161 PT EVAL LOW COMPLEX 20 MIN: CPT | Performed by: PHYSICAL THERAPIST

## 2018-11-28 PROCEDURE — G8990 OTHER PT/OT CURRENT STATUS: HCPCS | Performed by: PHYSICAL THERAPIST

## 2018-11-28 NOTE — PROGRESS NOTES
PT Evaluation     Today's date: 2018  Patient name: Adelina Keating  : 1960  MRN: [id number]  Referring provider: Betty Rojas PA-C  Dx:   Encounter Diagnosis     ICD-10-CM    1  Primary localized osteoarthritis of left hip M16 12 Ambulatory referral to Physical Therapy                  Assessment  Assessment details: Adelina Keating is a 62 y o  female who presents with pain, decreased strength, and decreased ROM  Due to these impairments, patient has difficulty performing a/iadls and recreational activities  Patient's clinical presentation is consistent with their referring diagnosis of left hip OA  Patient would benefit from skilled physical therapy to address their aforementioned impairments, improve their level of function and to improve their overall quality of life  Impairments: abnormal or restricted ROM, impaired physical strength and pain with function    Symptom irritability: lowUnderstanding of Dx/Px/POC: good   Prognosis: good    Goals  Short term goals  to be achieved in 4 weeks:     Decrease pain 20-50%  Increase strength by 1/2 grade  Improve range of motion by 25%  Long term goals  to be achieved by discharge:    Squatting is improved to maximal level of function  Stair climbing is improved to maximal level of function  IADL performance in related activities is improved to maximal level of function  Performance in related recreational activities is improved to maximal level of function       Plan  Planned therapy interventions: manual therapy, neuromuscular re-education, patient education, strengthening, stretching, therapeutic activities, therapeutic exercise and home exercise program  Frequency: 2x week  Duration in visits: 16  Duration in weeks: 8  Plan of Care beginning date: 2018  Plan of Care expiration date: 2019  Treatment plan discussed with: patient        Subjective Evaluation    History of Present Illness  Mechanism of injury: Patient refers to PT with c/o pain in her left hip which began in 2017 from playing tennis  Patient received image guided intra articular cortisone injection of her left hip on 18  X-rays taken on 18 revealed mild OA of bilateral hip joints; MRI of hip taken on 10/24/18 revealed degeneration and tearing of the anterior acetabular labrum of the left hip  Patient received prior PT evaluation and one follow up treatment on 18  Patient states decreased pain since receiving injection  Patient states pain is primarily located in the left groin area  Pain  Current pain ratin  At best pain ratin  At worst pain ratin  Quality: sharp  Relieving factors: rest  Aggravating factors: stair climbing (Donning shoes/socks, transferring in/out of car)  Progression: improved          Objective     Active Range of Motion   Left Hip   Flexion: 100 degrees   Abduction: 30 degrees   External rotation (90/90): 40 degrees   Internal rotation (90/90): 25 degrees     Passive Range of Motion   Left Hip   Flexion: 105 degrees   Abduction: 35 degrees   External rotation (90/90): 45 degrees   Internal rotation (90/90): 30 degrees     Strength/Myotome Testing     Left Hip   Planes of Motion   Flexion: 4-  Extension: 4  Abduction: 4  Adduction: 4    Tests     Left Hip   Positive scour  Flowsheet Rows      Most Recent Value   PT/OT G-Codes   Current Score  38   FOTO information reviewed  Yes   Assessment Type  Evaluation   G code set  Other PT/OT Primary   Other PT Primary Current Status ()  CL   Other PT Primary Goal Status ()  CJ          Precautions: Anxiety, Depression    Daily Treatment Diary     Manual                                                                                   Exercise Diary              Bike             Step ups             Step downs with ecc   focus             Mini squats             SLR flex             SLR abd             Bridges             Iso hip add             Clamshells Sup ITB stretch w/strap             Sup Hamstring str  w/strap                                                                                                                                      Modalities              CP prn

## 2018-12-04 ENCOUNTER — APPOINTMENT (OUTPATIENT)
Dept: PHYSICAL THERAPY | Facility: CLINIC | Age: 58
End: 2018-12-04
Payer: COMMERCIAL

## 2018-12-06 ENCOUNTER — OFFICE VISIT (OUTPATIENT)
Dept: PHYSICAL THERAPY | Facility: CLINIC | Age: 58
End: 2018-12-06
Payer: COMMERCIAL

## 2018-12-06 DIAGNOSIS — M16.12 PRIMARY LOCALIZED OSTEOARTHRITIS OF LEFT HIP: Primary | ICD-10-CM

## 2018-12-06 PROCEDURE — 97112 NEUROMUSCULAR REEDUCATION: CPT | Performed by: PHYSICAL THERAPIST

## 2018-12-06 PROCEDURE — 97140 MANUAL THERAPY 1/> REGIONS: CPT | Performed by: PHYSICAL THERAPIST

## 2018-12-06 PROCEDURE — 97110 THERAPEUTIC EXERCISES: CPT | Performed by: PHYSICAL THERAPIST

## 2018-12-06 NOTE — PROGRESS NOTES
Daily Note     Today's date: 2018  Patient name: Alka Garcia  : 1960  MRN: [id number]  Referring provider: Perla Canales PA-C  Dx:   Encounter Diagnosis     ICD-10-CM    1  Primary localized osteoarthritis of left hip M16 12                   Subjective: Most pain is in groin especially with car transfers    Objective: See treatment diary below      Assessment: Tolerated treatment well  Patient would benefit from continued PT  Pt had significant reduction in pain with bike motion post MT  Plan: Continue per plan of care  Precautions: Anxiety, Depression    Daily Treatment Diary     Manual                                                                                   Exercise Diary             Bike  6'           Step ups             Step downs with ecc   focus             Mini squats  20 :03           SLR flex  GTB 20           SLR abd  20           Bridges             Iso hip add             Clamshells  GTB :03 20           Sup ITB stretch w/strap  :           Sup Hamstring str  w/strap  :6           Lateral step  4'' 20           Sidestepping  GTB 5 laps                                                                                                          Modalities              CP prn  10'                                       Direct Supervision: 3:30-4

## 2018-12-11 ENCOUNTER — OFFICE VISIT (OUTPATIENT)
Dept: PHYSICAL THERAPY | Facility: CLINIC | Age: 58
End: 2018-12-11
Payer: COMMERCIAL

## 2018-12-11 DIAGNOSIS — M16.12 PRIMARY LOCALIZED OSTEOARTHRITIS OF LEFT HIP: Primary | ICD-10-CM

## 2018-12-11 PROCEDURE — 97140 MANUAL THERAPY 1/> REGIONS: CPT

## 2018-12-11 PROCEDURE — 97112 NEUROMUSCULAR REEDUCATION: CPT

## 2018-12-11 PROCEDURE — 97110 THERAPEUTIC EXERCISES: CPT

## 2018-12-11 NOTE — PROGRESS NOTES
Daily Note     Today's date: 2018  Patient name: Cleve Cazares  : 1960  MRN: [id number]  Referring provider: Jeb Britton PA-C  Dx:   Encounter Diagnosis     ICD-10-CM    1  Primary localized osteoarthritis of left hip M16 12        Start Time: 0504  Stop Time: 7201  Total time in clinic (min): 46 minutes    Subjective: Pt reports that her hip pain increases with activity, "2/10" at rest and "4/10" with walking  Pt reports that she believes the manual therapy from last session helped her  Patient reported again this session that she has relief post manuals  Objective: See treatment diary below      Assessment: Tolerated treatment well  Patient had no increase in pain with interventions listed below, and tolerated all increases to repittions  Patient would benefit from continued PT  Plan: Continue per plan of care  Precautions: Anxiety, Depression    Daily Treatment Diary     Manual             Lateral belt mobs   RH  8'                                                                  Exercise Diary            Bike  6' 6'           Step ups             Step downs with ecc   focus             Mini squats  20 :03 25x 3"          SLR flex  GTB 20 GTB  25x          SLR abd  20 HEP          Bridges             Iso hip add             Clamshells  GTB :03 20 np time          Sup ITB stretch w/strap  : 20"x6          Sup Hamstring str  w/strap  : 20"x6          Lateral step  4'' 20 6" 20x          Sidestepping  GTB 5 laps GTB  6 lap                                                                                                         Modalities             CP prn  10 10'

## 2018-12-13 ENCOUNTER — OFFICE VISIT (OUTPATIENT)
Dept: PHYSICAL THERAPY | Facility: CLINIC | Age: 58
End: 2018-12-13
Payer: COMMERCIAL

## 2018-12-13 DIAGNOSIS — M16.12 PRIMARY LOCALIZED OSTEOARTHRITIS OF LEFT HIP: Primary | ICD-10-CM

## 2018-12-13 PROCEDURE — 97110 THERAPEUTIC EXERCISES: CPT | Performed by: PHYSICAL THERAPIST

## 2018-12-13 PROCEDURE — 97140 MANUAL THERAPY 1/> REGIONS: CPT | Performed by: PHYSICAL THERAPIST

## 2018-12-13 NOTE — PROGRESS NOTES
Daily Note     Today's date: 2018  Patient name: Maryse Tillman  : 1960  MRN: [id number]  Referring provider: Yoandy De Luna PA-C  Dx:   Encounter Diagnosis     ICD-10-CM    1  Primary localized osteoarthritis of left hip M16 12                   Subjective: Pt presents today stating she is feeling well, compliance with HEP, MT intervention appears to really help with her symptoms  Objective: See treatment diary below      Assessment: MT intervention assisting with symptoms, proceeded with bridges to assist with trunk stability  Continue to progress as able with possible Leg press  Plan: Continue per plan of care  Precautions: Anxiety, Depression    Daily Treatment Diary     Manual            Lateral belt mobs   RH  8' 10 min                                                                 Exercise Diary           Bike  6' 6'  6 min         Step ups             Step downs with ecc   focus             Mini squats  20 :03 25x 3" 30x 3"         3 way Hip  GTB 20 GTB  25x GTB 2 x 10         SLR abd  20 HEP          Bridges    2 x 10         Iso hip add             Clamshells  GTB :03 20 np time GTB 3" x 20         Sup ITB stretch w/strap  : 20"x6 20" x 6         Sup Hamstring str  w/strap  : 20"x6 20" x 6         Lateral step  4'' 20 6" 20x 6" x 20         Sidestepping  GTB 5 laps GTB  6 lap GTB 6 laps         LP      75#                                                                                          Modalities            CP prn  10' 10'

## 2018-12-18 ENCOUNTER — APPOINTMENT (OUTPATIENT)
Dept: PHYSICAL THERAPY | Facility: CLINIC | Age: 58
End: 2018-12-18
Payer: COMMERCIAL

## 2018-12-20 ENCOUNTER — OFFICE VISIT (OUTPATIENT)
Dept: PHYSICAL THERAPY | Facility: CLINIC | Age: 58
End: 2018-12-20
Payer: COMMERCIAL

## 2018-12-20 DIAGNOSIS — M16.12 PRIMARY LOCALIZED OSTEOARTHRITIS OF LEFT HIP: Primary | ICD-10-CM

## 2018-12-20 PROCEDURE — 97140 MANUAL THERAPY 1/> REGIONS: CPT | Performed by: PHYSICAL THERAPIST

## 2018-12-20 PROCEDURE — 97110 THERAPEUTIC EXERCISES: CPT | Performed by: PHYSICAL THERAPIST

## 2018-12-20 NOTE — PROGRESS NOTES
Daily Note     Today's date: 2018  Patient name: Nitesh Ayon  : 1960  MRN: [id number]  Referring provider: Kale Seo PA-C  Dx:   Encounter Diagnosis     ICD-10-CM    1  Primary localized osteoarthritis of left hip M16 12        Start Time: 1700  Stop Time: 1740  Total time in clinic (min): 40 minutes    Subjective: Pt presents today stating that she is still having about the same presentation  She reports leaving her sessions here she feels very well, however she reports that once the next day returns her symptoms return and she is back to [place]  Objective: See treatment diary below      Assessment:  Proceeded with LP resistance for further hip and trunk stability  RE n v        Plan: Continue per plan of care  Precautions: Anxiety, Depression    Daily Treatment Diary     Manual           Lateral belt mobs   RH  8' 10 min 10 min                                                                Exercise Diary          Bike  6' 6'  6 min 6 min        Step ups             Step downs with ecc   focus             Mini squats  20 :03 25x 3" 30x 3" 30 x 3"        3 way Hip  GTB 20 GTB  25x GTB 2 x 10 GTB 3 x10        SLR abd  20 HEP          Bridges    2 x 10 3 x 10        Iso hip add             Clamshells  GTB :03 20 np time GTB 3" x 20 GTB 3" x 20        Sup ITB stretch w/strap  : 20"x6 20" x 6 20" x6        Sup Hamstring str  w/strap  : 20"x6 20" x 6 20" x 6        Lateral step  4'' 20 6" 20x 6" x 20 6" x 20        Sidestepping  GTB 5 laps GTB  6 lap GTB 6 laps GTB 6 laps        LP      75# 2 x 10                                                                                          Modalities           CP prn  10' 10'  10 min

## 2018-12-27 ENCOUNTER — APPOINTMENT (OUTPATIENT)
Dept: PHYSICAL THERAPY | Facility: CLINIC | Age: 58
End: 2018-12-27
Payer: COMMERCIAL

## 2018-12-27 NOTE — PROGRESS NOTES
PT Evaluation     Today's date: 2018  Patient name: Jaz Painter  : 1960  MRN: [id number]  Referring provider: Rere Eli PA-C  Dx:   No diagnosis found  Assessment  Assessment details: Jaz Painter is a 62 y o  female who presents with pain, decreased strength, and decreased ROM  Due to these impairments, patient has difficulty performing a/iadls and recreational activities  Patient's clinical presentation is consistent with their referring diagnosis of left hip OA  Patient would benefit from skilled physical therapy to address their aforementioned impairments, improve their level of function and to improve their overall quality of life  Impairments: abnormal or restricted ROM, impaired physical strength and pain with function    Symptom irritability: lowUnderstanding of Dx/Px/POC: good   Prognosis: good    Goals  Short term goals - to be achieved in 4 weeks:     Decrease pain 20-50%  Increase strength by 1/2 grade  Improve range of motion by 25%  Long term goals - to be achieved by discharge:    Squatting is improved to maximal level of function  Stair climbing is improved to maximal level of function  IADL performance in related activities is improved to maximal level of function  Performance in related recreational activities is improved to maximal level of function  Plan  Planned therapy interventions: manual therapy, neuromuscular re-education, patient education, strengthening, stretching, therapeutic activities, therapeutic exercise and home exercise program  Frequency: 2x week  Duration in visits: 16  Duration in weeks: 8  Plan of Care beginning date: 2018  Plan of Care expiration date: 2019  Treatment plan discussed with: patient        Subjective Evaluation    History of Present Illness  Mechanism of injury: Patient refers to PT with c/o pain in her left hip which began in 2017 from playing tennis    Patient received image guided intra articular cortisone injection of her left hip on 18  X-rays taken on 18 revealed mild OA of bilateral hip joints; MRI of hip taken on 10/24/18 revealed degeneration and tearing of the anterior acetabular labrum of the left hip  Patient received prior PT evaluation and one follow up treatment on 18  Patient states decreased pain since receiving injection  Patient states pain is primarily located in the left groin area  Pain  Current pain ratin  At best pain ratin  At worst pain ratin  Quality: sharp  Relieving factors: rest  Aggravating factors: stair climbing (Donning shoes/socks, transferring in/out of car)  Progression: improved          Objective     Active Range of Motion   Left Hip   Flexion: 100 degrees   Abduction: 30 degrees   External rotation (90/90): 40 degrees   Internal rotation (90/90): 25 degrees     Passive Range of Motion   Left Hip   Flexion: 105 degrees   Abduction: 35 degrees   External rotation (90/90): 45 degrees   Internal rotation (90/90): 30 degrees     Strength/Myotome Testing     Left Hip   Planes of Motion   Flexion: 4-  Extension: 4  Abduction: 4  Adduction: 4    Tests     Left Hip   Positive scour  Precautions: Anxiety, Depression    Daily Treatment Diary     Manual           Lateral belt mobs   RH  8' 10 min 10 min                                                                Exercise Diary          Bike  6' 6'  6 min 6 min        Step ups             Step downs with ecc   focus             Mini squats  20 :03 25x 3" 30x 3" 30 x 3"        3 way Hip  GTB 20 GTB  25x GTB 2 x 10 GTB 3 x10        SLR abd  20 HEP          Bridges    2 x 10 3 x 10        Iso hip add             Clamshells  GTB :03 20 np time GTB 3" x 20 GTB 3" x 20        Sup ITB stretch w/strap  : 20"x6 20" x 6 20" x6        Sup Hamstring str  w/strap  : 20"x6 20" x 6 20" x 6        Lateral step  4'' 20 6" 20x 6" x 20 6" x 20        Sidestepping  GTB 5 laps GTB  6 lap GTB 6 laps GTB 6 laps        LP      75# 2 x 10                                                                                          Modalities   12/6 12/11 12/13 12/20        CP prn  10' 10'  10 min

## 2018-12-28 PROCEDURE — G8990 OTHER PT/OT CURRENT STATUS: HCPCS | Performed by: PHYSICAL THERAPIST

## 2018-12-28 PROCEDURE — G8991 OTHER PT/OT GOAL STATUS: HCPCS | Performed by: PHYSICAL THERAPIST

## 2019-01-03 ENCOUNTER — EVALUATION (OUTPATIENT)
Dept: PHYSICAL THERAPY | Facility: CLINIC | Age: 59
End: 2019-01-03
Payer: COMMERCIAL

## 2019-01-03 DIAGNOSIS — M16.12 PRIMARY LOCALIZED OSTEOARTHRITIS OF LEFT HIP: Primary | ICD-10-CM

## 2019-01-03 PROCEDURE — G8991 OTHER PT/OT GOAL STATUS: HCPCS | Performed by: PHYSICAL THERAPIST

## 2019-01-03 PROCEDURE — G8990 OTHER PT/OT CURRENT STATUS: HCPCS | Performed by: PHYSICAL THERAPIST

## 2019-01-03 PROCEDURE — 97110 THERAPEUTIC EXERCISES: CPT | Performed by: PHYSICAL THERAPIST

## 2019-01-03 PROCEDURE — 97140 MANUAL THERAPY 1/> REGIONS: CPT | Performed by: PHYSICAL THERAPIST

## 2019-01-03 NOTE — PROGRESS NOTES
PT Evaluation     Today's date: 1/3/2019  Patient name: Candy Amaro  : 1960  MRN: [id number]  Referring provider: Gideon Benton PA-C  Dx:   Encounter Diagnosis     ICD-10-CM    1  Primary localized osteoarthritis of left hip M16 12                   Assessment  Assessment details: Pt at this time demonstrates improvement with her range, strength, flexibility as well as tolerance to activity  She still demonstrates pain level presentation, but her knowledge of HEP is very strong, and while encouragement for further PT was offered, pt indicated she would like to continue care on her own, thus education on how to perform self belt mobilization technique was done and Pt was very content with this  She indicated that if she is to have a decline she will most certainly return, but otherwise she would like to make today her last session and PT was in accord  Pt at this time will have her chart remain open for 3 weeks and if not word beyond that will be closed  Thank you very much for this kind and motivated referral     Impairments: abnormal or restricted ROM, impaired physical strength and pain with function    Symptom irritability: lowUnderstanding of Dx/Px/POC: good   Prognosis: good    Goals  Short term goals - to be achieved in 4 weeks:     Decrease pain 20-50%  -not met   Increase strength by 1/2 grade  -met   Improve range of motion by 25%  -met  Long term goals - to be achieved by discharge:    Squatting is improved to maximal level of function  -met   Stair climbing is improved to maximal level of function  -met   IADL performance in related activities is improved to maximal level of function  -met   Performance in related recreational activities is improved to maximal level of function  -partial    Plan  Plan details: Plan is for DC at this time     Planned therapy interventions: manual therapy, neuromuscular re-education, patient education, strengthening, stretching, therapeutic activities, therapeutic exercise and home exercise program  Duration in weeks: 6  Treatment plan discussed with: patient        Subjective Evaluation    History of Present Illness  Mechanism of injury: Pt presents today stating that since her injection in November which she indicated significant improvement  Pt reports that she still has pain but reports that her pain levels at worst have been 4  Pt reports there are periods of time without pain, when she is not moving, and has noted improvement with alternating during elevations  Pt reports that she does not have a follow up with Dr Fátima Gunter at this time  Pt reports that she found relief with the belt mobilization technique and the strengthening exercises offered her some relief as a whole, she desires to make today her last visit and would like to proceed on her own for activity and with HEP  Pain  Current pain ratin  At best pain ratin  At worst pain ratin  Quality: sharp  Relieving factors: rest  Aggravating factors: stair climbing (Donning shoes/socks, transferring in/out of car)  Progression: improved    Treatments  Previous treatment: injection treatment        Objective     Active Range of Motion   Left Hip   Flexion: 100 degrees   Abduction: 45 degrees   External rotation (90/90): 45 degrees   Internal rotation (90/90): 35 degrees     Additional Active Range of Motion Details  No biasing with squatting techniques  Passive Range of Motion   Left Hip   Flexion: 105 degrees   Abduction: 35 degrees   External rotation (90/90): 50 degrees   Internal rotation (90/90): 35 degrees     Strength/Myotome Testing     Left Hip   Planes of Motion   Flexion: 4  Extension: 5  Abduction: 4+  Adduction: 5    Tests     Left Hip   Positive scour             Precautions: Anxiety, Depression    Daily Treatment Diary     Manual   12/6 12/11 12/13 12/20 1/3       Lateral belt mobs   RH  8' 10 min 10 min 5 min self mob              10 min Assessment Exercise Diary  11/28 12/6 12/11 12/13 12/20 1/3       Bike  6' 6'  6 min 6 min 6 mins        Step ups             Step downs with ecc   focus             Mini squats  20 :03 25x 3" 30x 3" 30 x 3" 30x       3 way Hip  GTB 20 GTB  25x GTB 2 x 10 GTB 3 x10 GTB 3 x 10       SLR abd  20 HEP          Bridges    2 x 10 3 x 10        Iso hip add             Clamshells  GTB :03 20 np time GTB 3" x 20 GTB 3" x 20 GTB 3" x 20       Sup ITB stretch w/strap  :20/6 20"x6 20" x 6 20" x6 20" x 6       Sup Hamstring str  w/strap  :20/6 20"x6 20" x 6 20" x 6 20" x 6       Lateral step  4'' 20 6" 20x 6" x 20 6" x 20        Sidestepping  GTB 5 laps GTB  6 lap GTB 6 laps GTB 6 laps GTB 6 laps       LP      75# 2 x 10 75# 2 x 10                                                                                         Modalities   12/6 12/11 12/13 12/20 1/3       CP prn  10' 10'  10 min

## 2019-01-25 NOTE — PROGRESS NOTES
PT Discharge    Today's date: 2019  Patient name: Carlos Cartagena  : 1960  MRN: [id number]  Referring provider: Holly Egan PA-C  Dx:   Encounter Diagnosis     ICD-10-CM    1  Primary localized osteoarthritis of left hip M16 12 PT plan of care cert/re-cert       Start Time: 1200  Stop Time: 1240  Total time in clinic (min): 40 minutes    Assessment/Plan Pt has not been present since 1/3/19  Pt's chart will be DC in compliance of facility policy as all Charts are DC within 30 days of last scheduled visit          Subjective    Objective    Flowsheet Rows      Most Recent Value   PT/OT G-Codes   Current Score  62   Projected Score  57   Assessment Type  Re-evaluation   G code set  Other PT/OT Primary   Other PT Primary Current Status ()  CI   Other PT Primary Goal Status ()  CI

## 2019-02-13 ENCOUNTER — OFFICE VISIT (OUTPATIENT)
Dept: OBGYN CLINIC | Facility: CLINIC | Age: 59
End: 2019-02-13
Payer: COMMERCIAL

## 2019-02-13 VITALS
SYSTOLIC BLOOD PRESSURE: 137 MMHG | DIASTOLIC BLOOD PRESSURE: 82 MMHG | HEART RATE: 72 BPM | WEIGHT: 160 LBS | HEIGHT: 64 IN | BODY MASS INDEX: 27.31 KG/M2

## 2019-02-13 DIAGNOSIS — M16.12 PRIMARY LOCALIZED OSTEOARTHRITIS OF LEFT HIP: Primary | ICD-10-CM

## 2019-02-13 DIAGNOSIS — M77.11 LATERAL EPICONDYLITIS OF RIGHT ELBOW: ICD-10-CM

## 2019-02-13 PROCEDURE — 20551 NJX 1 TENDON ORIGIN/INSJ: CPT | Performed by: ORTHOPAEDIC SURGERY

## 2019-02-13 PROCEDURE — 99214 OFFICE O/P EST MOD 30 MIN: CPT | Performed by: ORTHOPAEDIC SURGERY

## 2019-02-13 RX ORDER — METHYLPREDNISOLONE ACETATE 40 MG/ML
1 INJECTION, SUSPENSION INTRA-ARTICULAR; INTRALESIONAL; INTRAMUSCULAR; SOFT TISSUE
Status: COMPLETED | OUTPATIENT
Start: 2019-02-13 | End: 2019-02-13

## 2019-02-13 RX ADMIN — METHYLPREDNISOLONE ACETATE 1 ML: 40 INJECTION, SUSPENSION INTRA-ARTICULAR; INTRALESIONAL; INTRAMUSCULAR; SOFT TISSUE at 11:05

## 2019-02-13 NOTE — PROGRESS NOTES
Patient Name:  Dani Vela  MRN:  [id number]    Assessment & Plan     Left hip DJD and right elbow lateral epicondylitis  1  Referred to Radiology for intra-articular steroid injection left hip  2  Steroid injection right lateral epicondyle performed today  3  Handout with home exercises for right elbow provided  4  Briefly discussed arthroscopic surgery or total hip arthroplasty for left hip, although I recommended that she continue nonsurgical measures for now  5  Follow-up as needed  Subjective     Patient returns today reporting increased pain in her left hip  She also developed right elbow pain localized to the lateral aspect  The elbow pain is worse with increased use  Her hip pain is worse with prolonged weight-bearing activity  No numbness or tingling  She responded well to an intra-articular steroid injection for her left hip  She takes naproxen which helps somewhat as well  General ROS:  Negative for fever or chills  Neurological ROS:  Negative for numbness or tingling  Objective     /82   Pulse 72   Ht 5' 4" (1 626 m)   Wt 72 6 kg (160 lb)   BMI 27 46 kg/m²     Right elbow:  No soft tissue swelling  Tenderness to palpation common extensor tendon near the lateral epicondyle  Full range of motion  Stable with varus and valgus stress  Pain with resisted wrist extension  Left hip:  Nontender to palpation  Range of motion is flexion 90° limited by pain, external rotation 50°, and internal rotation 10°  IDALMIS test is positive  Straight leg raise against resistance is positive  Passive supine rotation test is negative  5/5 strength hip flexion  Constitutional:  Well-developed and well-nourished  Eyes:  Anicteric sclerae  Lungs:  Unlabored breathing  Cardiovascular:  2+ radial pulse on the right  Skin:  Intact without erythema over the right elbow  Neurologic:  Sensation intact to light touch in the right upper extremity    Psychiatric:  Mood and affect are appropriate          Hand/upper extremity injection: R elbow  Date/Time: 2/13/2019 11:05 AM  Consent given by: patient  Site marked: site marked  Supporting Documentation  Indications: pain   Procedure Details  Condition:lateral epicondylitis Site: R elbow   Needle size: 25 G  Medications administered: 1 mL methylPREDNISolone acetate 40 mg/mL    Patient tolerance: patient tolerated the procedure well with no immediate complications  Dressing:  Sterile dressing applied

## 2019-02-26 ENCOUNTER — HOSPITAL ENCOUNTER (OUTPATIENT)
Dept: RADIOLOGY | Facility: HOSPITAL | Age: 59
Discharge: HOME | End: 2019-02-26
Attending: ORTHOPAEDIC SURGERY
Payer: COMMERCIAL

## 2019-02-26 DIAGNOSIS — M16.12 PRIMARY LOCALIZED OSTEOARTHRITIS OF LEFT HIP: ICD-10-CM

## 2019-02-26 PROCEDURE — 20610 DRAIN/INJ JOINT/BURSA W/O US: CPT

## 2019-02-26 PROCEDURE — 77002 NEEDLE LOCALIZATION BY XRAY: CPT

## 2019-02-26 RX ORDER — LIDOCAINE HYDROCHLORIDE 10 MG/ML
20 INJECTION, SOLUTION INFILTRATION; PERINEURAL
Status: COMPLETED | OUTPATIENT
Start: 2019-02-26 | End: 2019-02-26

## 2019-02-26 RX ORDER — BUPIVACAINE HYDROCHLORIDE 2.5 MG/ML
10 INJECTION, SOLUTION EPIDURAL; INFILTRATION; INTRACAUDAL
Status: COMPLETED | OUTPATIENT
Start: 2019-02-26 | End: 2019-02-26

## 2019-02-26 RX ORDER — METHYLPREDNISOLONE ACETATE 80 MG/ML
80 INJECTION, SUSPENSION INTRA-ARTICULAR; INTRALESIONAL; INTRAMUSCULAR; SOFT TISSUE
Status: COMPLETED | OUTPATIENT
Start: 2019-02-26 | End: 2019-02-26

## 2019-02-26 RX ADMIN — METHYLPREDNISOLONE ACETATE 80 MG: 80 INJECTION, SUSPENSION INTRA-ARTICULAR; INTRALESIONAL; INTRAMUSCULAR; SOFT TISSUE at 16:10

## 2019-02-26 RX ADMIN — BUPIVACAINE HYDROCHLORIDE 3 ML: 2.5 INJECTION, SOLUTION EPIDURAL; INFILTRATION; INTRACAUDAL; PERINEURAL at 16:10

## 2019-02-26 RX ADMIN — LIDOCAINE HYDROCHLORIDE 4 ML: 10 INJECTION, SOLUTION INFILTRATION; PERINEURAL at 16:10

## 2019-02-26 RX ADMIN — IOHEXOL 3 ML: 300 INJECTION, SOLUTION INTRAVENOUS at 16:10

## 2019-03-26 ENCOUNTER — TELEPHONE (OUTPATIENT)
Dept: OBGYN CLINIC | Facility: MEDICAL CENTER | Age: 59
End: 2019-03-26

## 2019-03-26 ENCOUNTER — OFFICE VISIT (OUTPATIENT)
Dept: URGENT CARE | Age: 59
End: 2019-03-26
Payer: COMMERCIAL

## 2019-03-26 VITALS
HEART RATE: 64 BPM | TEMPERATURE: 98 F | HEIGHT: 64 IN | SYSTOLIC BLOOD PRESSURE: 115 MMHG | RESPIRATION RATE: 18 BRPM | BODY MASS INDEX: 27.31 KG/M2 | WEIGHT: 160 LBS | DIASTOLIC BLOOD PRESSURE: 66 MMHG | OXYGEN SATURATION: 97 %

## 2019-03-26 DIAGNOSIS — L03.011 PARONYCHIA OF FINGER, RIGHT: Primary | ICD-10-CM

## 2019-03-26 PROCEDURE — S9088 SERVICES PROVIDED IN URGENT: HCPCS | Performed by: FAMILY MEDICINE

## 2019-03-26 PROCEDURE — 99213 OFFICE O/P EST LOW 20 MIN: CPT | Performed by: FAMILY MEDICINE

## 2019-03-26 RX ORDER — CEPHALEXIN 500 MG/1
500 CAPSULE ORAL EVERY 12 HOURS SCHEDULED
Qty: 14 CAPSULE | Refills: 0 | Status: SHIPPED | OUTPATIENT
Start: 2019-03-26 | End: 2019-04-02

## 2019-03-26 NOTE — PATIENT INSTRUCTIONS
Take all antibiotics as prescribed  Warm soaks multiple times throughout the day  Call PCP or Ortho/Hand Specialist to schedule a follow-up appt in the next few days for reevaluation and to ensure resolution of symptoms  Call [facility] to schedule an appointment: [phone]    Go to the nearest ER for evaluation if any fevers, redness, warmth, discharge, bleeding, pain, streaking redness, signs of infection, new or worsening symptoms, or other concerning symptoms

## 2019-03-26 NOTE — PROGRESS NOTES
[facility]        NAME: Adelina Keating is a 62 y o  female  : 1960    MRN: [id number]  DATE: 2019  TIME: 11:00 AM    Assessment and Plan   Paronychia of finger, right [L03 011]  1  Paronychia of finger, right  cephalexin (KEFLEX) 500 mg capsule    Ambulatory referral to Hand Surgery         Patient Instructions     Take all antibiotics as prescribed  Warm soaks multiple times throughout the day  Call PCP or Ortho/Hand Specialist to schedule a follow-up appt in the next few days for reevaluation and to ensure resolution of symptoms  Call [facility] to schedule an appointment: [phone]    Go to the nearest ER for evaluation if any fevers, redness, warmth, discharge, bleeding, pain, streaking redness, signs of infection, new or worsening symptoms, or other concerning symptoms  Chief Complaint     Chief Complaint   Patient presents with    Hand Pain     right thumb infected x 1 week         History of Present Illness       59-year-old female presents with right thumb paronychia x 1 week  Patient states she does bite her nails and she bit off a hangnail last week  Has noticed slight swelling, drainage around the nail bed  Slight throbbing pain worse with palpation of the hangnail  She denies any fevers, numbness, tingling, weakness, redness and streaking redness, excessive pain or other complaints  She states she has tried Tylenol with relief  Review of Systems   Review of Systems   Constitutional: Negative for chills, fatigue and fever  Eyes: Negative for visual disturbance  Respiratory: Negative for cough and shortness of breath  Cardiovascular: Negative for chest pain  Gastrointestinal: Negative for abdominal pain, nausea and vomiting  Musculoskeletal: Negative for back pain, neck pain and neck stiffness  Skin: Positive for wound (Paronychia of right thumb)  Neurological: Negative for dizziness, weakness, numbness and headaches     All other systems reviewed and are negative          Current Medications       Current Outpatient Medications:     Calcium-Magnesium-Vitamin D (CALCIUM 500) 500-250-200 MG-MG-UNIT TABS, Take by mouth, Disp: , Rfl:     cephalexin (KEFLEX) 500 mg capsule, Take 1 capsule (500 mg total) by mouth every 12 (twelve) hours for 7 days, Disp: 14 capsule, Rfl: 0    citalopram (CeleXA) 40 mg tablet, Take 1 tablet (40 mg total) by mouth daily, Disp: 90 tablet, Rfl: 3    Iron-Vitamin C (IRON 100/C) 100-250 MG TABS, Take by mouth, Disp: , Rfl:     Multiple Vitamin (MULTI-VITAMIN DAILY PO), , Disp: , Rfl:     Scar Treatment Products (RECEDO) GEL, APPLY TWICE A DAY TO SCAR AS NEEDED, Disp: , Rfl: 3    traZODone (DESYREL) 50 mg tablet, Take 1 tablet (50 mg total) by mouth daily at bedtime, Disp: 90 tablet, Rfl: 3    Current Allergies     Allergies as of 2019 - Reviewed 2019   Allergen Reaction Noted    Benzoin  2015    Meloxicam Other (See Comments) 2018    Monistat [tioconazole] Hives 2018            The following portions of the patient's history were reviewed and updated as appropriate: allergies, current medications, past family history, past medical history, past social history, past surgical history and problem list      Past Medical History:   Diagnosis Date    Known health problems: none        Past Surgical History:   Procedure Laterality Date     SECTION       SECTION      FL INJECTION LEFT HIP (ARTHROGRAM)  10/24/2018    FL INJECTION LEFT HIP (NON ARTHROGRAM)  2018    FL INJECTION LEFT HIP (NON ARTHROGRAM)  2019    SHOULDER ARTHROPLASTY Right     1990    SHOULDER SURGERY      WISDOM TOOTH EXTRACTION         Family History   Problem Relation Age of Onset    Arthritis Mother     Hypertension Mother     Scoliosis Mother     Hyperlipidemia Mother     Arthritis Father     Heart disease Father         CARDIAC DISORDER    Ovarian cancer Maternal Aunt Medications have been verified  Objective   /66   Pulse 64   Temp 98 °F (36 7 °C)   Resp 18   Ht 5' 4" (1 626 m)   Wt 72 6 kg (160 lb)   SpO2 97%   BMI 27 46 kg/m²        Physical Exam     Physical Exam   Constitutional: She is oriented to person, place, and time  She appears well-developed and well-nourished  No distress  Cardiovascular: Normal rate, regular rhythm and normal heart sounds  Pulmonary/Chest: Effort normal and breath sounds normal  No respiratory distress  She has no wheezes  Musculoskeletal:        Right hand: She exhibits normal range of motion, no bony tenderness and normal capillary refill  Normal sensation noted  Normal strength noted  Hands:  DIP/PIP flexion tendons intact  Full extension   Neurological: She is alert and oriented to person, place, and time  She has normal reflexes  No sensory deficit  NV intact with sensation and strong peripheral pulses  5/5 strength of UE bilaterally   Skin: Skin is warm and dry  Capillary refill takes less than 2 seconds  Rash (Paronychia to right thumb  Mildly tender to palpation  No current drainage  No erythema or warmth  No streaking) noted  Psychiatric: She has a normal mood and affect  Her behavior is normal    Nursing note and vitals reviewed

## 2019-06-13 ENCOUNTER — TELEPHONE (OUTPATIENT)
Dept: OBGYN CLINIC | Facility: CLINIC | Age: 59
End: 2019-06-13

## 2019-06-13 DIAGNOSIS — M16.12 PRIMARY LOCALIZED OSTEOARTHRITIS OF LEFT HIP: Primary | ICD-10-CM

## 2019-06-14 ENCOUNTER — TELEPHONE (OUTPATIENT)
Dept: OBGYN CLINIC | Facility: HOSPITAL | Age: 59
End: 2019-06-14

## 2019-06-14 DIAGNOSIS — M16.12 PRIMARY LOCALIZED OSTEOARTHRITIS OF LEFT HIP: Primary | ICD-10-CM

## 2019-06-14 RX ORDER — NAPROXEN 500 MG/1
500 TABLET ORAL 2 TIMES DAILY WITH MEALS
Qty: 60 TABLET | Refills: 0 | Status: SHIPPED | OUTPATIENT
Start: 2019-06-14 | End: 2019-11-01

## 2019-07-09 DIAGNOSIS — F33.0 MILD EPISODE OF RECURRENT MAJOR DEPRESSIVE DISORDER (HCC): ICD-10-CM

## 2019-07-09 RX ORDER — CITALOPRAM 40 MG/1
40 TABLET ORAL DAILY
Qty: 90 TABLET | Refills: 3 | Status: SHIPPED | OUTPATIENT
Start: 2019-07-09 | End: 2020-07-08

## 2019-07-14 ENCOUNTER — OFFICE VISIT (OUTPATIENT)
Dept: URGENT CARE | Age: 59
End: 2019-07-14
Payer: COMMERCIAL

## 2019-07-14 VITALS
SYSTOLIC BLOOD PRESSURE: 127 MMHG | RESPIRATION RATE: 16 BRPM | WEIGHT: 160 LBS | DIASTOLIC BLOOD PRESSURE: 72 MMHG | BODY MASS INDEX: 27.31 KG/M2 | HEART RATE: 65 BPM | HEIGHT: 64 IN | OXYGEN SATURATION: 100 % | TEMPERATURE: 97.5 F

## 2019-07-14 DIAGNOSIS — H00.014 HORDEOLUM EXTERNUM OF LEFT UPPER EYELID: Primary | ICD-10-CM

## 2019-07-14 PROCEDURE — 99213 OFFICE O/P EST LOW 20 MIN: CPT | Performed by: FAMILY MEDICINE

## 2019-07-14 PROCEDURE — S9088 SERVICES PROVIDED IN URGENT: HCPCS | Performed by: FAMILY MEDICINE

## 2019-07-14 RX ORDER — CIPROFLOXACIN HYDROCHLORIDE 3.5 MG/ML
1 SOLUTION/ DROPS TOPICAL EVERY 4 HOURS
Qty: 2.5 ML | Refills: 0 | Status: SHIPPED | OUTPATIENT
Start: 2019-07-14 | End: 2019-07-21

## 2019-07-14 NOTE — PROGRESS NOTES
[id number]        NAME: Maddy Amador is a 62 y o  female  : 1960    MRN: [id number]  DATE: 2019  TIME: 10:36 AM    Assessment and Plan   Hordeolum externum of left upper eyelid [H00 014]  1  Hordeolum externum of left upper eyelid  ciprofloxacin (CILOXAN) 0 3 % ophthalmic solution         Patient Instructions     Use eyedrops or ointment in affected eyes as prescribed  Throw away contacts  Not use contacts while using the antibiotic drops  Follow-up with Optometrist/PCP in the next 1-2 days for further evaluation and treatment  Go to the ED if any fevers, unable to stay hydrated, change in vision, headache, facial swelling or erythema, eye pain, pain with eye movements, abdominal pain, chest pain, shortness of breath, new or worsening symptoms or other concerning symptoms  Chief Complaint     Chief Complaint   Patient presents with    Conjunctivitis     left eye itching and swollen upper lid since friday          History of Present Illness       63-year-old female presents with left upper eyelid swelling and irritation/itchiness times 2-3 days  Patient denies any injury or trauma  She denies any drainage or waking up with her eye crusted shut  She denies any recent cough/cold symptoms  Denies any fevers  Denies any vision changes, pain with eye movement, eye pain, facial swelling or erythema or warmth, or other complaints  States she does wear contacts as well as glasses but has not worn her, for the past few days  Review of Systems   Review of Systems   Constitutional: Negative for chills, fatigue and fever  HENT: Negative for congestion, facial swelling, rhinorrhea, sore throat, trouble swallowing and voice change  Eyes: Positive for itching  Negative for photophobia, pain, discharge, redness and visual disturbance  Slightly swollen left upper eyelid/stye   Respiratory: Negative for cough and shortness of breath      Cardiovascular: Negative for chest pain  Musculoskeletal: Negative for neck pain  Neurological: Negative for dizziness, numbness and headaches  All other systems reviewed and are negative          Current Medications       Current Outpatient Medications:     Calcium-Magnesium-Vitamin D (CALCIUM 500) 500-250-200 MG-MG-UNIT TABS, Take by mouth, Disp: , Rfl:     citalopram (CeleXA) 40 mg tablet, Take 1 tablet (40 mg total) by mouth daily, Disp: 90 tablet, Rfl: 3    Iron-Vitamin C (IRON 100/C) 100-250 MG TABS, Take by mouth, Disp: , Rfl:     Multiple Vitamin (MULTI-VITAMIN DAILY PO), , Disp: , Rfl:     Scar Treatment Products (RECEDO) GEL, APPLY TWICE A DAY TO SCAR AS NEEDED, Disp: , Rfl: 3    traZODone (DESYREL) 50 mg tablet, Take 1 tablet (50 mg total) by mouth daily at bedtime, Disp: 90 tablet, Rfl: 3    ciprofloxacin (CILOXAN) 0 3 % ophthalmic solution, Administer 1 drop into the left eye every 4 (four) hours for 7 days, Disp: 2 5 mL, Rfl: 0    naproxen (NAPROSYN) 500 mg tablet, Take 1 tablet (500 mg total) by mouth 2 (two) times a day with meals (Patient not taking: Reported on 2019), Disp: 60 tablet, Rfl: 0    Current Allergies     Allergies as of 2019 - Reviewed 2019   Allergen Reaction Noted    Benzoin  2015    Meloxicam Other (See Comments) 2018    Monistat [tioconazole] Hives 2018            The following portions of the patient's history were reviewed and updated as appropriate: allergies, current medications, past family history, past medical history, past social history, past surgical history and problem list      Past Medical History:   Diagnosis Date    Known health problems: none        Past Surgical History:   Procedure Laterality Date     SECTION       SECTION      FL INJECTION LEFT HIP (ARTHROGRAM)  10/24/2018    FL INJECTION LEFT HIP (NON ARTHROGRAM)  2018    FL INJECTION LEFT HIP (NON ARTHROGRAM)  2019    SHOULDER ARTHROPLASTY Right     1990  SHOULDER SURGERY      WISDOM TOOTH EXTRACTION         Family History   Problem Relation Age of Onset    Arthritis Mother     Hypertension Mother     Scoliosis Mother     Hyperlipidemia Mother     Arthritis Father     Heart disease Father         CARDIAC DISORDER    Ovarian cancer Maternal Aunt          Medications have been verified  Objective   /72   Pulse 65   Temp 97 5 °F (36 4 °C) (Temporal)   Resp 16   Ht 5' 4" (1 626 m)   Wt 72 6 kg (160 lb)   SpO2 100%   BMI 27 46 kg/m²        Physical Exam     Physical Exam   Constitutional: She is oriented to person, place, and time  She appears well-developed and well-nourished  HENT:   Head: Normocephalic and atraumatic  Right Ear: Tympanic membrane normal    Left Ear: Tympanic membrane normal    Mouth/Throat: Uvula is midline and oropharynx is clear and moist    Eyes: Pupils are equal, round, and reactive to light  Conjunctivae and EOM are normal  Left eye exhibits hordeolum  Left eye exhibits no discharge  No foreign body present in the left eye  No nystagmus, no pain with EOMs  No facial/periorbital swelling or erythema   Neck: Normal range of motion  Neck supple  Cardiovascular: Normal rate, regular rhythm and normal heart sounds  Pulmonary/Chest: Effort normal and breath sounds normal  She has no wheezes  Neurological: She is alert and oriented to person, place, and time  She has normal reflexes  Psychiatric: She has a normal mood and affect  Her behavior is normal    Nursing note and vitals reviewed

## 2019-07-23 ENCOUNTER — TRANSCRIBE ORDERS (OUTPATIENT)
Dept: RADIOLOGY | Facility: HOSPITAL | Age: 59
End: 2019-07-23

## 2019-07-23 ENCOUNTER — HOSPITAL ENCOUNTER (OUTPATIENT)
Dept: RADIOLOGY | Facility: HOSPITAL | Age: 59
Discharge: HOME | End: 2019-07-23
Payer: COMMERCIAL

## 2019-07-23 DIAGNOSIS — M16.12 PRIMARY LOCALIZED OSTEOARTHRITIS OF LEFT HIP: ICD-10-CM

## 2019-07-23 PROCEDURE — 20610 DRAIN/INJ JOINT/BURSA W/O US: CPT

## 2019-07-23 PROCEDURE — 77002 NEEDLE LOCALIZATION BY XRAY: CPT

## 2019-07-23 RX ORDER — METHYLPREDNISOLONE ACETATE 80 MG/ML
80 INJECTION, SUSPENSION INTRA-ARTICULAR; INTRALESIONAL; INTRAMUSCULAR; SOFT TISSUE
Status: COMPLETED | OUTPATIENT
Start: 2019-07-23 | End: 2019-07-23

## 2019-07-23 RX ORDER — BUPIVACAINE HYDROCHLORIDE 2.5 MG/ML
20 INJECTION, SOLUTION EPIDURAL; INFILTRATION; INTRACAUDAL
Status: COMPLETED | OUTPATIENT
Start: 2019-07-23 | End: 2019-07-23

## 2019-07-23 RX ORDER — LIDOCAINE HYDROCHLORIDE 10 MG/ML
10 INJECTION, SOLUTION INFILTRATION; PERINEURAL
Status: COMPLETED | OUTPATIENT
Start: 2019-07-23 | End: 2019-07-23

## 2019-07-23 RX ADMIN — METHYLPREDNISOLONE ACETATE 80 MG: 80 INJECTION, SUSPENSION INTRA-ARTICULAR; INTRALESIONAL; INTRAMUSCULAR; SOFT TISSUE at 15:55

## 2019-07-23 RX ADMIN — BUPIVACAINE HYDROCHLORIDE 7 ML: 2.5 INJECTION, SOLUTION EPIDURAL; INFILTRATION; INTRACAUDAL; PERINEURAL at 15:55

## 2019-07-23 RX ADMIN — LIDOCAINE HYDROCHLORIDE 6 ML: 10 INJECTION, SOLUTION INFILTRATION; PERINEURAL at 15:55

## 2019-07-23 RX ADMIN — IOHEXOL 2 ML: 300 INJECTION, SOLUTION INTRAVENOUS at 15:55

## 2019-08-14 ENCOUNTER — TELEPHONE (OUTPATIENT)
Dept: FAMILY MEDICINE CLINIC | Facility: CLINIC | Age: 59
End: 2019-08-14

## 2019-08-14 NOTE — TELEPHONE ENCOUNTER
PT ASKED IF YOU COULD SQUEEZE HER IN THUR AFTER 5 OR FIRST THING ON FRI MORNING  PT HAS BUMP ON OPENING OF HER VAGINA   PLS ADVISE

## 2019-08-15 ENCOUNTER — OFFICE VISIT (OUTPATIENT)
Dept: GYNECOLOGY | Facility: CLINIC | Age: 59
End: 2019-08-15
Payer: COMMERCIAL

## 2019-08-15 VITALS
HEART RATE: 65 BPM | WEIGHT: 160.4 LBS | DIASTOLIC BLOOD PRESSURE: 82 MMHG | BODY MASS INDEX: 27.53 KG/M2 | SYSTOLIC BLOOD PRESSURE: 120 MMHG

## 2019-08-15 DIAGNOSIS — N76.6 VULVAR ULCER: Primary | ICD-10-CM

## 2019-08-15 DIAGNOSIS — Z20.2 POSSIBLE EXPOSURE TO STD: ICD-10-CM

## 2019-08-15 PROCEDURE — 87255 GENET VIRUS ISOLATE HSV: CPT | Performed by: NURSE PRACTITIONER

## 2019-08-15 PROCEDURE — 87491 CHLMYD TRACH DNA AMP PROBE: CPT | Performed by: NURSE PRACTITIONER

## 2019-08-15 PROCEDURE — 87591 N.GONORRHOEAE DNA AMP PROB: CPT | Performed by: NURSE PRACTITIONER

## 2019-08-15 PROCEDURE — 87140 CULTURE TYPE IMMUNOFLUORESC: CPT | Performed by: NURSE PRACTITIONER

## 2019-08-15 PROCEDURE — 99214 OFFICE O/P EST MOD 30 MIN: CPT | Performed by: NURSE PRACTITIONER

## 2019-08-15 NOTE — PATIENT INSTRUCTIONS
Req given for HIV, RPR HSV1/2 IgM, IgG  GC/CT done  Herpes culture done  Will sign up for [facility] to obtain results or call   Partner testing and condom use encouraged

## 2019-08-15 NOTE — PROGRESS NOTES
Assessment/Plan:   Req given for HIV, RPR HSV1/2 IgM, IgG  GC/CT done  Herpes culture done  [facility]   Partner testing and condom use encouraged        Diagnoses and all orders for this visit:    Vulvar ulcer  -     Herpes I /II IgM Ab Indriect; Future  -     Herpes I/II IgG EDER w Reflex to HSV-2; Future    Possible exposure to STD  -     Human Immunodeficiency Virus 1/2 Antigen / Antibody ( Fourth Generation) with Reflex Testing; Future  -     RPR; Future  -     Herpes I /II IgM Ab Indriect; Future  -     Herpes I/II IgG EDER w Reflex to HSV-2; Future  -     Chlamydia/GC amplified DNA by PCR              Subjective:      Patient ID: Chong Hicks is a 62 y o  female  Chong Hicks is a 62 y o  female who is here today for a problem visit  She noticed lower left labial irritation that progressed to a bump since yesterday  She feels it has increased in size  Tender to touch  Rates pain 7-8/10  No alleviating factors  LMP approx 8 years  Chong Hicks is sexually active with male partner of 4 months  No condom use  No STI testing in past  Denies abnormal vaginal discharge, pelvic pain, enlarged lymph nodes or fevers  Exercises most days of week  The following portions of the patient's history were reviewed and updated as appropriate: allergies, current medications, past family history, past medical history, past social history, past surgical history and problem list     Review of Systems   Constitutional: Negative  HENT: Negative for sore throat and trouble swallowing  Gastrointestinal: Negative for abdominal pain, constipation, diarrhea, nausea and vomiting  Genitourinary: Positive for genital sores  Negative for dyspareunia, dysuria, pelvic pain, vaginal bleeding, vaginal discharge and vaginal pain  Musculoskeletal: Negative for arthralgias and myalgias  Skin: Negative  Hematological: Negative for adenopathy  Psychiatric/Behavioral: Negative      All other systems reviewed and are negative  Objective:      /82   Pulse 65   Wt 72 8 kg (160 lb 6 4 oz)   BMI 27 53 kg/m²          Physical Exam   Constitutional: She is oriented to person, place, and time  She appears well-developed and well-nourished  Genitourinary: Vagina normal and uterus normal  Rectal exam shows no external hemorrhoid  Pelvic exam was performed with patient supine  No labial fusion  There is no rash, tenderness, lesion or injury on the right labia  There is tenderness and lesion (Two 1-2 mm round tender ulcers on lower labia majora) on the left labia  There is no rash or injury on the left labia  Cervix exhibits no motion tenderness, no discharge and no friability  Right adnexum displays no mass, no tenderness and no fullness  Left adnexum displays no mass, no tenderness and no fullness  Musculoskeletal: Normal range of motion  Lymphadenopathy: No inguinal adenopathy noted on the right or left side  Right: No inguinal adenopathy present  Left: No inguinal adenopathy present  Neurological: She is alert and oriented to person, place, and time  Skin: Skin is warm and dry  Psychiatric: She has a normal mood and affect  Nursing note and vitals reviewed

## 2019-08-16 ENCOUNTER — TELEPHONE (OUTPATIENT)
Dept: GYNECOLOGY | Facility: CLINIC | Age: 59
End: 2019-08-16

## 2019-08-16 DIAGNOSIS — N76.6 VULVAR ULCER: Primary | ICD-10-CM

## 2019-08-16 NOTE — TELEPHONE ENCOUNTER
Thought a prescription for lidocaine was going to be sent to the pharmacy however they have not received anything  Requesting it is sent to the [facility] at [facility]

## 2019-08-18 ENCOUNTER — LAB (OUTPATIENT)
Dept: LAB | Age: 59
End: 2019-08-18
Payer: COMMERCIAL

## 2019-08-18 DIAGNOSIS — N76.6 VULVAR ULCER: ICD-10-CM

## 2019-08-18 DIAGNOSIS — Z20.2 POSSIBLE EXPOSURE TO STD: ICD-10-CM

## 2019-08-18 LAB
HIV 1+2 AB+HIV1 P24 AG SERPL QL IA: NORMAL
HIV1 P24 AG SER QL: NORMAL

## 2019-08-18 PROCEDURE — 87806 HIV AG W/HIV1&2 ANTB W/OPTIC: CPT

## 2019-08-18 PROCEDURE — 86695 HERPES SIMPLEX TYPE 1 TEST: CPT

## 2019-08-18 PROCEDURE — 86696 HERPES SIMPLEX TYPE 2 TEST: CPT

## 2019-08-18 PROCEDURE — 86592 SYPHILIS TEST NON-TREP QUAL: CPT

## 2019-08-18 PROCEDURE — 36415 COLL VENOUS BLD VENIPUNCTURE: CPT

## 2019-08-19 LAB
C TRACH DNA SPEC QL NAA+PROBE: NEGATIVE
HSV1 IGG SER IA-ACNC: 20.6 INDEX (ref 0–0.9)
HSV2 IGG SER IA-ACNC: <0.91 INDEX (ref 0–0.9)
N GONORRHOEA DNA SPEC QL NAA+PROBE: NEGATIVE
RPR SER QL: NORMAL

## 2019-08-20 LAB
HSV1 IGM TITR SER IF: NORMAL TITER
HSV2 IGM TITR SER IF: NORMAL TITER

## 2019-08-20 NOTE — RESULT ENCOUNTER NOTE
Testing including cultures is negative except for herpes simplex 1  It reveals that you have had prior exposure in the past    Not recently  This most commonly is associated with a history of cold sores  If this is not the case then you may wish to have a visit with Eva to discuss things further

## 2019-08-22 LAB — HSV SPEC CULT: ABNORMAL

## 2019-08-26 DIAGNOSIS — B00.9 HSV-1 (HERPES SIMPLEX VIRUS 1) INFECTION: Primary | ICD-10-CM

## 2019-08-26 RX ORDER — VALACYCLOVIR HYDROCHLORIDE 500 MG/1
500 TABLET, FILM COATED ORAL 2 TIMES DAILY
Qty: 6 TABLET | Refills: 5 | Status: SHIPPED | OUTPATIENT
Start: 2019-08-26 | End: 2019-11-01 | Stop reason: ALTCHOICE

## 2019-08-30 ENCOUNTER — VBI (OUTPATIENT)
Dept: ADMINISTRATIVE | Facility: OTHER | Age: 59
End: 2019-08-30

## 2019-08-30 LAB — HSV1 SPEC QL CULT: ABNORMAL

## 2019-08-30 NOTE — LETTER
10/23/19    Moira Disha  [address] [facility] [address]       Dear Johnny [facility] is committed to providing our employees and their families with education to help maintain a healthy lifestyle  Our records indicate that you have never had or are overdue for a colorectal cancer screening  By completing a colorectal cancer screening regularly, colorectal cancer can be detected and treated early  We have made several attempts to contact you as a part of the [address] [place], [place] [address] [place]  Please contact us directly to discuss colorectal cancer screening options  Upon review we can update your chart, contact your Primary Care for a Colonoscopy referral, or as an alternative screening mail you a FIT kit  The FIT kit can be completed in the comfort of your own home  The packaged mail will include a lab slip, instructions, and all necessary supplies to complete the FIT screening  Upon completion, the results will be sent directly to your Primary Care Physician for review  Lio Saldana MD is encouraging you to participate in this screening  Early colon cancer detection can be lifesaving  Please contact us at your earliest convenience      Sincerely,        Chanelle White MA  [facility]  [phone]

## 2019-08-30 NOTE — TELEPHONE ENCOUNTER
Initial attempt made and documented for CRC Screening Patient/Employee Outreach on 08/30/19  Follow-up outreach scheduled to be completed within 7 business days  Kellee Whiteside MA    Second attempt made and documented for [facility] Screening Patient/Employee Outreach on 09/12/19  Follow-up outreach scheduled to be completed within 7 business days      Kellee Whiteside MA    Letter mailed today 19-65-11

## 2019-09-03 ENCOUNTER — TELEPHONE (OUTPATIENT)
Dept: GYNECOLOGY | Facility: CLINIC | Age: 59
End: 2019-09-03

## 2019-10-27 DIAGNOSIS — F33.0 MILD EPISODE OF RECURRENT MAJOR DEPRESSIVE DISORDER (HCC): ICD-10-CM

## 2019-10-28 ENCOUNTER — TELEPHONE (OUTPATIENT)
Dept: OBGYN CLINIC | Facility: HOSPITAL | Age: 59
End: 2019-10-28

## 2019-10-28 RX ORDER — TRAZODONE HYDROCHLORIDE 50 MG/1
TABLET ORAL
Qty: 90 TABLET | Refills: 0 | Status: SHIPPED | OUTPATIENT
Start: 2019-10-28 | End: 2020-01-29

## 2019-10-28 NOTE — TELEPHONE ENCOUNTER
Please check and see if she still a patient here and if so I would like to see her for a visit this month or next month  I will refill her meds    - Dr Dylan Grimm

## 2019-11-01 ENCOUNTER — OFFICE VISIT (OUTPATIENT)
Dept: FAMILY MEDICINE CLINIC | Facility: CLINIC | Age: 59
End: 2019-11-01
Payer: COMMERCIAL

## 2019-11-01 ENCOUNTER — OFFICE VISIT (OUTPATIENT)
Dept: OBGYN CLINIC | Facility: CLINIC | Age: 59
End: 2019-11-01
Payer: COMMERCIAL

## 2019-11-01 VITALS
WEIGHT: 164 LBS | HEIGHT: 64 IN | SYSTOLIC BLOOD PRESSURE: 126 MMHG | DIASTOLIC BLOOD PRESSURE: 80 MMHG | TEMPERATURE: 98 F | OXYGEN SATURATION: 99 % | RESPIRATION RATE: 16 BRPM | BODY MASS INDEX: 28 KG/M2 | HEART RATE: 69 BPM

## 2019-11-01 VITALS
WEIGHT: 164 LBS | DIASTOLIC BLOOD PRESSURE: 85 MMHG | SYSTOLIC BLOOD PRESSURE: 134 MMHG | HEART RATE: 73 BPM | HEIGHT: 64 IN | BODY MASS INDEX: 28 KG/M2

## 2019-11-01 DIAGNOSIS — F41.9 ANXIETY: Primary | ICD-10-CM

## 2019-11-01 DIAGNOSIS — Z12.31 VISIT FOR SCREENING MAMMOGRAM: ICD-10-CM

## 2019-11-01 DIAGNOSIS — F33.0 MILD EPISODE OF RECURRENT MAJOR DEPRESSIVE DISORDER (HCC): ICD-10-CM

## 2019-11-01 DIAGNOSIS — E78.2 MIXED HYPERLIPIDEMIA: ICD-10-CM

## 2019-11-01 DIAGNOSIS — M24.152 DEGENERATIVE TEAR OF ACETABULAR LABRUM OF LEFT HIP: ICD-10-CM

## 2019-11-01 DIAGNOSIS — M16.12 PRIMARY LOCALIZED OSTEOARTHRITIS OF LEFT HIP: Primary | ICD-10-CM

## 2019-11-01 PROCEDURE — 99214 OFFICE O/P EST MOD 30 MIN: CPT | Performed by: FAMILY MEDICINE

## 2019-11-01 PROCEDURE — 99213 OFFICE O/P EST LOW 20 MIN: CPT | Performed by: ORTHOPAEDIC SURGERY

## 2019-11-01 PROCEDURE — 3008F BODY MASS INDEX DOCD: CPT | Performed by: FAMILY MEDICINE

## 2019-11-01 NOTE — PROGRESS NOTES
Patient Name:  Albertina Grey  MRN:  [id number]    Assessment & Plan     Left hip DJD, degenerative labral tear  1  Patient would like to continue to maximize conservative measures at this time  She would like to try a repeat intra-articular steroid injection with Radiology  Referral provided  2  Continue naproxen as needed  3  Activity modification as needed  4  Follow-up as needed  Briefly discussed repeat left hip x-ray and possible left hip arthroscopy if pain persists after intra-articular injection  Discussed the potential for persistent pain even after hip arthroscopy  Subjective     54-year-old female returns to the office today for follow-up regarding her left hip  She was last seen on 2/13/19  At that time continued conservative management was recommended with intermittent intra-articular steroid injections  Her most recent intra-articular steroid injection was 7/23/19  She denies any significant relief after undergoing this injection  She notes persistent pain localized to the anterior aspect of the left hip and groin  She denies any pain laterally  Pain is worse with activities  She is also noting pain while at rest   She denies instability or weakness  No numbness or tingling  She takes naproxen intermittently  No fevers or chills  General ROS:  Negative for fever or chills  Neurological ROS:  Negative for numbness or tingling  Objective     /85   Pulse 73   Ht 5' 4" (1 626 m)   Wt 74 4 kg (164 lb)   BMI 28 15 kg/m²     Left hip:  No gross deformity  No tenderness to palpation greater trochanter  Hip range of motion is intact and limited in all planes with discomfort  Positive logroll test   Positive IDALMIS and FADDIR test   Positive resisted straight leg raise test   Sensation intact left lower extremity  Skin warm and well perfused  Psychiatric: Mood and affect are appropriate        Social History     Tobacco Use    Smoking status: Former Smoker    Smokeless tobacco: Never Used   Substance Use Topics    Alcohol use: Yes     Frequency: 2-3 times a week     Drinks per session: 1 or 2    Drug use: No       Scribe Attestation    I,:   Cherie Méndez PA-C am acting as a scribe while in the presence of the attending physician :        I,:   Michael Torres MD personally performed the services described in this documentation    as scribed in my presence :

## 2019-11-01 NOTE — PROGRESS NOTES
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  Comments:  care per Dr Shweta June     Visit for screening mammogram  -     Mammo screening bilateral w cad; Future        BMI Counseling: Body mass index is 28 15 kg/m²  The BMI is above normal  Nutrition recommendations include reducing portion sizes, decreasing overall calorie intake, 3-5 servings of fruits/vegetables daily, reducing fast food intake and consuming healthier snacks  Exercise recommendations include moderate aerobic physical activity for 150 minutes/week  Subjective:      Patient ID: Nikky Adams is a 62 y o  female  Here to follow up  C/o left hip pain- seeing dr Shweta June   Had injection in the past   Started at a new department this year    Anxiety   Presents for follow-up visit  Patient reports no confusion, decreased concentration, depressed mood, dizziness, excessive worry, feeling of choking, hyperventilation, impotence, irritability, malaise, muscle tension, nervous/anxious behavior, obsessions, palpitations, panic, restlessness, shortness of breath or suicidal ideas  Symptoms occur occasionally  The quality of sleep is good  Compliance with medications is %  The following portions of the patient's history were reviewed and updated as appropriate: allergies, current medications, past family history, past medical history, past social history, past surgical history and problem list     Review of Systems   Constitutional: Negative  Negative for activity change, appetite change, chills and irritability     HENT: Negative  Eyes: Negative for pain  Respiratory: Negative for apnea, choking, chest tightness and shortness of breath  Cardiovascular: Negative for palpitations and leg swelling  Endocrine: Negative for heat intolerance  Genitourinary: Negative for dyspareunia and impotence  Musculoskeletal: Negative for arthralgias  Neurological: Negative for dizziness, seizures, light-headedness and numbness  Hematological: Negative for adenopathy  Does not bruise/bleed easily  Psychiatric/Behavioral: Negative for agitation, behavioral problems, confusion, decreased concentration and suicidal ideas  The patient is not nervous/anxious  Objective:      /80 (BP Location: Left arm, Patient Position: Sitting, Cuff Size: Standard)   Pulse 69   Temp 98 °F (36 7 °C) (Tympanic)   Resp 16   Ht 5' 4" (1 626 m)   Wt 74 4 kg (164 lb)   SpO2 99%   BMI 28 15 kg/m²          Physical Exam   Constitutional: She appears well-developed and well-nourished  HENT:   Head: Normocephalic and atraumatic  Mouth/Throat: No oropharyngeal exudate  Eyes: Pupils are equal, round, and reactive to light  EOM are normal  Left eye exhibits no discharge  No scleral icterus  Neck: No tracheal deviation present  No thyromegaly present  Cardiovascular: Exam reveals no friction rub  No murmur heard  Pulmonary/Chest: No stridor  No respiratory distress  Neurological: No cranial nerve deficit  Coordination normal    Skin: No erythema  Psychiatric: She has a normal mood and affect   Her behavior is normal

## 2019-11-18 ENCOUNTER — HOSPITAL ENCOUNTER (OUTPATIENT)
Dept: RADIOLOGY | Facility: HOSPITAL | Age: 59
Discharge: HOME | End: 2019-11-18
Payer: COMMERCIAL

## 2019-11-18 DIAGNOSIS — M16.12 PRIMARY LOCALIZED OSTEOARTHRITIS OF LEFT HIP: ICD-10-CM

## 2019-11-18 PROCEDURE — 20610 DRAIN/INJ JOINT/BURSA W/O US: CPT

## 2019-11-18 PROCEDURE — 77002 NEEDLE LOCALIZATION BY XRAY: CPT

## 2019-11-18 RX ORDER — METHYLPREDNISOLONE ACETATE 80 MG/ML
80 INJECTION, SUSPENSION INTRA-ARTICULAR; INTRALESIONAL; INTRAMUSCULAR; SOFT TISSUE
Status: COMPLETED | OUTPATIENT
Start: 2019-11-18 | End: 2019-11-18

## 2019-11-18 RX ORDER — LIDOCAINE HYDROCHLORIDE 10 MG/ML
10 INJECTION, SOLUTION INFILTRATION; PERINEURAL
Status: COMPLETED | OUTPATIENT
Start: 2019-11-18 | End: 2019-11-18

## 2019-11-18 RX ORDER — BUPIVACAINE HYDROCHLORIDE 2.5 MG/ML
10 INJECTION, SOLUTION EPIDURAL; INFILTRATION; INTRACAUDAL
Status: COMPLETED | OUTPATIENT
Start: 2019-11-18 | End: 2019-11-18

## 2019-11-18 RX ADMIN — BUPIVACAINE HYDROCHLORIDE 2 ML: 2.5 INJECTION, SOLUTION EPIDURAL; INFILTRATION; INTRACAUDAL; PERINEURAL at 14:10

## 2019-11-18 RX ADMIN — LIDOCAINE HYDROCHLORIDE 10 ML: 10 INJECTION, SOLUTION INFILTRATION; PERINEURAL at 14:10

## 2019-11-18 RX ADMIN — METHYLPREDNISOLONE ACETATE 80 MG: 80 INJECTION, SUSPENSION INTRA-ARTICULAR; INTRALESIONAL; INTRAMUSCULAR; SOFT TISSUE at 14:10

## 2019-11-18 RX ADMIN — IOHEXOL 5 ML: 300 INJECTION, SOLUTION INTRAVENOUS at 14:10

## 2019-12-04 NOTE — PATIENT INSTRUCTIONS
Use eyedrops or ointment in affected eyes as prescribed  Throw away contacts  Not use contacts while using the antibiotic drops  Follow-up with Optometrist/PCP in the next 1-2 days for further evaluation and treatment  Go to the ED if any fevers, unable to stay hydrated, change in vision, headache, facial swelling or erythema, eye pain, pain with eye movements, abdominal pain, chest pain, shortness of breath, new or worsening symptoms or other concerning symptoms  Statement Selected

## 2020-01-03 ENCOUNTER — TELEPHONE (OUTPATIENT)
Dept: OBGYN CLINIC | Facility: HOSPITAL | Age: 60
End: 2020-01-03

## 2020-01-03 DIAGNOSIS — M24.152 DEGENERATIVE TEAR OF ACETABULAR LABRUM OF LEFT HIP: Primary | ICD-10-CM

## 2020-01-03 DIAGNOSIS — M16.12 PRIMARY LOCALIZED OSTEOARTHRITIS OF LEFT HIP: ICD-10-CM

## 2020-01-03 NOTE — TELEPHONE ENCOUNTER
please let the patient know that physical therapy referral was given she may call any of  our physical therapy office to schedule an evaluation

## 2020-01-03 NOTE — TELEPHONE ENCOUNTER
I called Charlotte back and left a message on her voice mail letting her know that the PT script is in her chart

## 2020-01-03 NOTE — TELEPHONE ENCOUNTER
Patient called in  Cb# [phone]    Patient said that she would like to try physical therapy  She wants to know if you can provide her with a script  Please advise the patient once it has been put in the system

## 2020-01-16 ENCOUNTER — EVALUATION (OUTPATIENT)
Dept: PHYSICAL THERAPY | Facility: CLINIC | Age: 60
End: 2020-01-16
Payer: COMMERCIAL

## 2020-01-16 DIAGNOSIS — M24.152 DEGENERATIVE TEAR OF ACETABULAR LABRUM OF LEFT HIP: Primary | ICD-10-CM

## 2020-01-16 DIAGNOSIS — M16.12 PRIMARY LOCALIZED OSTEOARTHRITIS OF LEFT HIP: ICD-10-CM

## 2020-01-16 PROCEDURE — 97140 MANUAL THERAPY 1/> REGIONS: CPT | Performed by: PHYSICAL THERAPIST

## 2020-01-16 PROCEDURE — 97110 THERAPEUTIC EXERCISES: CPT | Performed by: PHYSICAL THERAPIST

## 2020-01-16 PROCEDURE — 97162 PT EVAL MOD COMPLEX 30 MIN: CPT | Performed by: PHYSICAL THERAPIST

## 2020-01-16 NOTE — PROGRESS NOTES
PT Evaluation     Today's date: 2020  Patient name: Noam Rodrgíuez  : 1960  MRN: [id number]  Referring provider: Aracelis Foreman  Dx:   Encounter Diagnosis     ICD-10-CM    1  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 M16 12 Ambulatory referral to Physical Therapy   2  Primary localized osteoarthritis of left hip M16 12 Ambulatory referral to Physical Therapy                  Assessment  Assessment details: Pt presents with signs and symptoms synonymous of admitting diagnosis  Pt presents with pain, decreased strength, decreased range, flexibility, as well as tolerance to activity and postural awareness  Pt would benefit skilled PT intervention in order to address these impairments in order to be able to perform all desired activities with minimal to nil symptom exacerbation  Thank you very much for this kind and familiar referral      Impairments: abnormal or restricted ROM, activity intolerance, impaired balance, impaired physical strength, lacks appropriate home exercise program and pain with function  Understanding of Dx/Px/POC: good   Prognosis: good    Goals  STG 4 Weeks:  Decrease pain at worst to 6/10  Improve range to improve all planes by 5 from IE  Improve strength to TA draw 20", hip 4  Independent with HEP  LTG 8 Weeks:  Decrease pain at worst to 2/10  Improve range to 10 from IE  Improve strength to 30", hip to 4  Able to perform all desired activities with minimal to nil symptom exacerbation      Plan  Plan details: 1x a week for 6-8 weeks for HEP refresher and PT intervention     Patient would benefit from: skilled physical therapy  Planned modality interventions: cryotherapy, TENS and thermotherapy: hydrocollator packs  Planned therapy interventions: joint mobilization, manual therapy, abdominal trunk stabilization, balance, patient education, postural training, strengthening, stretching, therapeutic activities, therapeutic exercise, therapeutic training, home exercise program, graded motor, graded exercise, graded activity, gait training, functional ROM exercises and flexibility  Frequency: 1x week  Duration in weeks: 8  Treatment plan discussed with: patient        Subjective Evaluation    History of Present Illness  Date of onset: 2020  Mechanism of injury: Pt is a 61 yofemale who is familiar to this facility as she was treated for L hip pain which is why she is here again due to OA and Degenerative of L labral tear  Pt reports that since her last cycle of PT she has had 4 injections which seemed to help with the first 2, the 3rd one was unsuccessful and the 4th had some improvement which was performed on 19  Pt reports that she developed originally had L hip pain that developed after playing tennis and she heard a pop and it has been bothering her since then  Pt reports that now her large goals are to be able to work on her hip but she has forgotten some of her HEP and would like a refresher course  Pt reports that there are periods of time without pain, but at worst can be an 8/10 but it goes away very quickly  Pt denies any history of back pain, numbness tingling or change in bowel or bladder  Pt follows up with Dr Joseph Lackey as needed      Quality of life: good    Pain  Current pain ratin  At best pain ratin  At worst pain ratin  Quality: sharp and needle-like  Relieving factors: rest, relaxation, medications and change in position  Aggravating factors: sitting, standing and stair climbing  Progression: improved      Diagnostic Tests  X-ray: normal  MRI studies: abnormal (Labral Tear and OA)  Treatments  Previous treatment: injection treatment, medication and physical therapy  Current treatment: injection treatment and medication  Patient Goals  Patient goals for therapy: decreased pain, improved balance, increased motion, increased strength and return to sport/leisure activities          Objective     Active Range of Motion   Left Hip Flexion: 105 degrees with pain  External rotation (90/90): 40 degrees with pain  Internal rotation (90/90): 25 degrees with pain    Right Hip   Flexion: 120 degrees   External rotation (90/90): 45 degrees   Internal rotation (90/90): 45 degrees     Additional Active Range of Motion Details  Lumbar Flex 60 ext 5, SB R 10 SB L 10 Rot L 75 Rot R 50  Fair posture, increased lordosis   Sensation intact to light touch L3,4,5,S1,S2  Genu valgus slight R > L  Mild antalgia L > R  SLS L 10" R 10"  Hip Strength  L Flex 4Ext 5 Abd 5 Add 5  R Flex 4 Ext 5 Abd 5 Add 5  LE Screen:  Strong and painless  Palpation:  STs:   L  (-) SI compression/ Distraction, + Scour, + Neel, + FADIR + Distraction      R All (-)  TA draw 12"        Flowsheet Rows      Most Recent Value   PT/OT G-Codes   Current Score  45   Projected Score  59             Precautions: Hx of Melanoma     Daily Treatment Diary       Manual 1/16            L Hip Distraction G4 10 min            L hip Abd                                                    Exercise Diary             Bike             Bridges 3" x 20            SLR Flex Abd Ext             Tband 3 way hip  RTB           Tband Side Step  RTB           Bridge L LE             Tband Row + Ext   GTB           Tband Multi Rotation  RTB                                                                                                                                                                                    Modalities             HP/CP prn

## 2020-01-20 ENCOUNTER — OFFICE VISIT (OUTPATIENT)
Dept: PHYSICAL THERAPY | Facility: CLINIC | Age: 60
End: 2020-01-20
Payer: COMMERCIAL

## 2020-01-20 DIAGNOSIS — M16.12 PRIMARY LOCALIZED OSTEOARTHRITIS OF LEFT HIP: ICD-10-CM

## 2020-01-20 DIAGNOSIS — M24.152 DEGENERATIVE TEAR OF ACETABULAR LABRUM OF LEFT HIP: Primary | ICD-10-CM

## 2020-01-20 PROCEDURE — 97010 HOT OR COLD PACKS THERAPY: CPT

## 2020-01-20 PROCEDURE — 97110 THERAPEUTIC EXERCISES: CPT

## 2020-01-20 PROCEDURE — 97140 MANUAL THERAPY 1/> REGIONS: CPT | Performed by: PHYSICAL THERAPIST

## 2020-01-20 NOTE — PROGRESS NOTES
Daily Note     Today's date: 2020  Patient name: Roscoe Irwin  : 1960  MRN: [id number]  Referring provider: Esperanza Beach PA-C  Dx: No diagnosis found  Subjective: Patient states that she has been compliant with HEP without complaints  Updated HEP today  Objective: See treatment diary below      Assessment: Tolerated treatment fair  Patient exhibited good technique with therapeutic exercises      Plan: Continue per plan of care        Precautions: Hx of Melanoma     Daily Treatment Diary       Manual            L Hip Distraction G4 10 min 5 min - TS            L hip Abd  5 - TS                                                  Exercise Diary             Treadnmill  6 min            Bridges 3" x 20 3" x 20           SLR Flex Abd Ext  2 x 10            Tband 3 way hip  RTB 2 x 10           Tband Side Step  RTB 5 laps            Bridge L LE             Tband Row + Ext   GTB           Tband Multi Rotation  RTB                                                                                                                                                                                    Modalities             HP/CP prn  10 min MHP

## 2020-01-29 DIAGNOSIS — F33.0 MILD EPISODE OF RECURRENT MAJOR DEPRESSIVE DISORDER (HCC): ICD-10-CM

## 2020-01-29 RX ORDER — TRAZODONE HYDROCHLORIDE 50 MG/1
TABLET ORAL
Qty: 90 TABLET | Refills: 0 | Status: SHIPPED | OUTPATIENT
Start: 2020-01-29 | End: 2020-05-18

## 2020-01-30 ENCOUNTER — APPOINTMENT (OUTPATIENT)
Dept: PHYSICAL THERAPY | Facility: CLINIC | Age: 60
End: 2020-01-30
Payer: COMMERCIAL

## 2020-02-03 ENCOUNTER — OFFICE VISIT (OUTPATIENT)
Dept: PHYSICAL THERAPY | Facility: CLINIC | Age: 60
End: 2020-02-03
Payer: COMMERCIAL

## 2020-02-03 DIAGNOSIS — M16.12 PRIMARY LOCALIZED OSTEOARTHRITIS OF LEFT HIP: ICD-10-CM

## 2020-02-03 DIAGNOSIS — M24.152 DEGENERATIVE TEAR OF ACETABULAR LABRUM OF LEFT HIP: Primary | ICD-10-CM

## 2020-02-03 PROCEDURE — 97140 MANUAL THERAPY 1/> REGIONS: CPT | Performed by: PHYSICAL THERAPIST

## 2020-02-03 PROCEDURE — 97110 THERAPEUTIC EXERCISES: CPT

## 2020-02-03 NOTE — PROGRESS NOTES
Daily Note     Today's date: 2/3/2020  Patient name: Nikky Adams  : 1960  MRN: [id number]  Referring provider: Emiliana Wong  Dx:   Encounter Diagnosis     ICD-10-CM    1  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 M16 12    2  Primary localized osteoarthritis of left hip M16 12                   Subjective: Patient states that she has noticed an improvement with pain when flexing L hip   States that she has been maximally compliant with HEP      Objective: See treatment diary below      Assessment: Tolerated treatment well  Patient exhibited good technique with therapeutic exercises      Plan: Continue per plan of care        Precautions: Hx of Melanoma     Daily Treatment Diary       Manual  2/3          L Hip Distraction G4 10 min 5 min - TS  5- TS          L hip Abdj9  5 - TS 5 - TS                                                 Exercise Diary             Treadnmill  6 min  6 min          Bridges 3" x 20 3" x 20 3"  X 20           SLR Flex Abd Ext  2 x 10  2 x 10 (1 5# abd/ext)          Tband 3 way hip  RTB 2 x 10 RTB 2 x 10           Tband Side Step  RTB 5 laps  BTB 5 laps          Bridge L LE             Tband Row + Ext   GTB GTB 2 x 10           Tband Multi Rotation  RTB                                                                                                                                                                                    Modalities             HP/CP prn  10 min MHP  declined

## 2020-02-13 ENCOUNTER — EVALUATION (OUTPATIENT)
Dept: PHYSICAL THERAPY | Facility: CLINIC | Age: 60
End: 2020-02-13
Payer: COMMERCIAL

## 2020-02-13 DIAGNOSIS — M16.12 PRIMARY LOCALIZED OSTEOARTHRITIS OF LEFT HIP: ICD-10-CM

## 2020-02-13 DIAGNOSIS — M24.152 DEGENERATIVE TEAR OF ACETABULAR LABRUM OF LEFT HIP: Primary | ICD-10-CM

## 2020-02-13 PROCEDURE — 97140 MANUAL THERAPY 1/> REGIONS: CPT | Performed by: PHYSICAL THERAPIST

## 2020-02-13 PROCEDURE — 97112 NEUROMUSCULAR REEDUCATION: CPT | Performed by: PHYSICAL THERAPIST

## 2020-02-13 PROCEDURE — 97110 THERAPEUTIC EXERCISES: CPT | Performed by: PHYSICAL THERAPIST

## 2020-02-13 NOTE — PROGRESS NOTES
PT Evaluation     Today's date: 2020  Patient name: Freddie Pena  : 1960  MRN: [id number]  Referring provider: Terrence Homans  Dx:   Encounter Diagnosis     ICD-10-CM    1  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 M16 12    2  Primary localized osteoarthritis of left hip M16 12                   Assessment  Assessment details: Pt is progressing well at this time  They have demonstrated improvement in pain levels at worst which is far less frequent than it had been, increased strength, range, flexibility as well as tolerance to activity  They have achieved all STGs sought out for them as well as by them  They will benefit continued Skilled PT intervention in order to achieve all LTGs sought out for them as well as by them in order to perform all desired activities with minimal to nil symptom exacerbation  Thank you very much for this kind and motivated referral         Impairments: abnormal or restricted ROM, activity intolerance, impaired balance, impaired physical strength, lacks appropriate home exercise program and pain with function  Understanding of Dx/Px/POC: good   Prognosis: good    Goals  STG 4 Weeks:  Decrease pain at worst to 6/10 -partial  Improve range to improve all planes by 5 from IE -met  Improve strength to TA draw 20", hip 4 -met  Independent with HEP -met  LTG 8 Weeks:  Decrease pain at worst to 2/10  Improve range to 10 from IE  Improve strength to 30", hip to 4  Able to perform all desired activities with minimal to nil symptom exacerbation      Plan  Plan details: 1x a week for 6-8 weeks for HEP refresher and PT intervention     Patient would benefit from: skilled physical therapy  Planned modality interventions: cryotherapy, TENS and thermotherapy: hydrocollator packs  Planned therapy interventions: joint mobilization, manual therapy, abdominal trunk stabilization, balance, patient education, postural training, strengthening, stretching, therapeutic activities, therapeutic exercise, therapeutic training, home exercise program, graded motor, graded exercise, graded activity, gait training, functional ROM exercises and flexibility  Frequency: 1x week  Duration in weeks: 8  Treatment plan discussed with: patient        Subjective Evaluation    History of Present Illness  Date of onset: 2020  Mechanism of injury: Pt presents today stating that she is feeling much better, pain levels are down to very little 1-2 unless it is too much which is very infrequent which is a 7/10  Pt reports that she finds that she is much more flexible, stronger, and her elevation ability has also improved  Pt reports that elevations are far easier, walking recreationally but she has played tennis in doubles, but not singles  Pt reports that she has been compliant with HEP and does not have a follow up with Dr Hoang Jaime  Pt reports that she would like to be able to continue work on generalized conditioning, return to bike riding and continue to provide decrease in pain levels     Quality of life: good    Pain  Current pain ratin  At best pain ratin  At worst pain ratin  Quality: sharp and needle-like  Relieving factors: rest, relaxation, medications and change in position  Aggravating factors: sitting, standing and stair climbing  Progression: improved      Diagnostic Tests  X-ray: normal  MRI studies: abnormal (Labral Tear and OA)  Treatments  Previous treatment: injection treatment, medication and physical therapy  Current treatment: injection treatment and medication  Patient Goals  Patient goals for therapy: decreased pain, improved balance, increased motion, increased strength and return to sport/leisure activities          Objective     Active Range of Motion   Left Hip   Flexion: 115 degrees with pain  External rotation (90/90): 45 degrees with pain  Internal rotation (90/90): 30 degrees with pain    Right Hip   Flexion: 120 degrees   External rotation (90/90): 45 degrees   Internal rotation (90/90): 45 degrees     Additional Active Range of Motion Details  Lumbar Flex 80 ext 15 , SB R 15 SB L 15 Rot L 75 Rot R 75  Fair posture, increased lordosis   Sensation intact to light touch L3,4,5,S1,S2  Genu valgus slight R > L  Mild antalgia L > R  SLS L 10" R 10"  Hip Strength  L Flex 4 Ext 5 Abd 5 Add 5  R Flex 4 Ext 5 Abd 5 Add 5  LE Screen:  Strong and painless  Palpation:  STs:   L  (-) SI compression/ Distraction, + Scour, + Neel, + FADIR + Distraction      R All (-)  TA draw 20"               Precautions: Hx of Melanoma     Daily Treatment Diary       Manual 1/16 1/20 2/3 2/13         L Hip Distraction G4 10 min 5 min - TS  5- TS 5          L hip Abdj9  5 - TS 5 - TS 5                                                Exercise Diary             Treadnmill  6 min  6 min 6 min         Bridges 3" x 20 3" x 20 3"  X 20  3"x  20         SLR Flex Abd Ext  2 x 10  2 x 10 (1 5# abd/ext) 2 x 10 (1 5# abd ext)          Tband 3 way hip  RTB 2 x 10 RTB 2 x 10  RTB 2 x 10         Tband Side Step  RTB 5 laps  BTB 5 laps Riaz         Bridge L LE             Tband Row + Ext   GTB GTB 2 x 10           Tband Multi Rotation  RTB nv                                                                                                                                                                                   Modalities             HP/CP prn  10 min MHP  declined dec

## 2020-02-17 ENCOUNTER — ANNUAL EXAM (OUTPATIENT)
Dept: GYNECOLOGY | Facility: CLINIC | Age: 60
End: 2020-02-17
Payer: COMMERCIAL

## 2020-02-17 VITALS
DIASTOLIC BLOOD PRESSURE: 82 MMHG | WEIGHT: 166.8 LBS | HEART RATE: 71 BPM | HEIGHT: 64 IN | SYSTOLIC BLOOD PRESSURE: 120 MMHG | BODY MASS INDEX: 28.48 KG/M2

## 2020-02-17 DIAGNOSIS — Z12.31 ENCOUNTER FOR SCREENING MAMMOGRAM FOR BREAST CANCER: ICD-10-CM

## 2020-02-17 DIAGNOSIS — Z01.419 ENCOUNTER FOR ANNUAL ROUTINE GYNECOLOGICAL EXAMINATION: Primary | ICD-10-CM

## 2020-02-17 DIAGNOSIS — Z12.4 SCREENING FOR CERVICAL CANCER: ICD-10-CM

## 2020-02-17 PROCEDURE — 99396 PREV VISIT EST AGE 40-64: CPT | Performed by: OBSTETRICS & GYNECOLOGY

## 2020-02-17 PROCEDURE — G0145 SCR C/V CYTO,THINLAYER,RESCR: HCPCS | Performed by: OBSTETRICS & GYNECOLOGY

## 2020-02-17 PROCEDURE — 87624 HPV HI-RISK TYP POOLED RSLT: CPT | Performed by: OBSTETRICS & GYNECOLOGY

## 2020-02-18 LAB
HPV HR 12 DNA CVX QL NAA+PROBE: NEGATIVE
HPV16 DNA CVX QL NAA+PROBE: NEGATIVE
HPV18 DNA CVX QL NAA+PROBE: NEGATIVE

## 2020-02-20 ENCOUNTER — OFFICE VISIT (OUTPATIENT)
Dept: PHYSICAL THERAPY | Facility: CLINIC | Age: 60
End: 2020-02-20
Payer: COMMERCIAL

## 2020-02-20 DIAGNOSIS — M16.12 PRIMARY LOCALIZED OSTEOARTHRITIS OF LEFT HIP: ICD-10-CM

## 2020-02-20 DIAGNOSIS — M24.152 DEGENERATIVE TEAR OF ACETABULAR LABRUM OF LEFT HIP: ICD-10-CM

## 2020-02-20 DIAGNOSIS — M79.671 FOOT PAIN, BILATERAL: Primary | ICD-10-CM

## 2020-02-20 DIAGNOSIS — M79.672 FOOT PAIN, BILATERAL: Primary | ICD-10-CM

## 2020-02-20 LAB
LAB AP GYN PRIMARY INTERPRETATION: NORMAL
Lab: NORMAL

## 2020-02-20 PROCEDURE — 97110 THERAPEUTIC EXERCISES: CPT | Performed by: PHYSICAL THERAPIST

## 2020-02-20 PROCEDURE — 97162 PT EVAL MOD COMPLEX 30 MIN: CPT | Performed by: PHYSICAL THERAPIST

## 2020-02-20 NOTE — PROGRESS NOTES
PT Evaluation     Today's date: 2020  Patient name: Ernie Mann  : 1960  MRN: [id number]  Referring provider: Haze Hodgkin  Dx:   Encounter Diagnosis     ICD-10-CM    1  Foot pain, bilateral M79 671     M79 672    2  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 M16 12    3  Primary localized osteoarthritis of left hip M16 12                   Assessment  Assessment details: Pt presents with signs and symptoms synonymous of B achilles insertional tendonitis  Pt presents with pain, decreased strength, decreased range, flexibility, as well as tolerance to activity and decreased joint mobility  Pt would benefit skilled PT intervention as a direct access pt, but also continue with her current hip intervention in order to address current and prior impairments in order to be able to perform all desired activities with minimal to nil symptom exacerbation  If she fails via conservative intervention she may benefit consultation with orthopedic referral   As pt is a direct access evaluation she can be treated for 30 days unless her family physician is willing to sign POC, this will allow her to be able to continue with intervention if required  Impairments: abnormal or restricted ROM, activity intolerance, impaired balance, impaired physical strength, lacks appropriate home exercise program and pain with function  Understanding of Dx/Px/POC: good   Prognosis: good    Goals  STG 4 Weeks:  hip  Decrease pain at worst to 6/10 -partial  Improve range to improve all planes by 5 from IE -met  Improve strength to TA draw 20", hip 4 -met  Independent with HEP -met  LTG 8 Weeks:  Decrease pain at worst to 2/10  Improve range to 10 from IE  Improve strength to 30", hip to 4     Able to perform all desired activities with minimal to nil symptom exacerbation    STG 4 Weeks:  foot  Decrease pain at worst to 6/10  Improve range to improve DF and GTE by 5 from IE B  Improve strength to to 8 HR B SL  Independent with HEP  LTG 8 Weeks:  Decrease pain at worst to 2/10  Improve range to DF and GT B by 10 from IE  Improve strength to 15 HR B SL  Able to perform all desired activities with minimal to nil symptom exacerbation      Plan  Plan details: Continuation of Hip PT, beginning of Foot pain  Patient would benefit from: skilled physical therapy  Planned modality interventions: cryotherapy, TENS and thermotherapy: hydrocollator packs  Planned therapy interventions: joint mobilization, manual therapy, abdominal trunk stabilization, balance, patient education, postural training, strengthening, stretching, therapeutic activities, therapeutic exercise, therapeutic training, home exercise program, graded motor, graded exercise, graded activity, gait training, functional ROM exercises and flexibility  Frequency: 2x week  Duration in weeks: 10  Treatment plan discussed with: patient        Subjective Evaluation    History of Present Illness  Date of onset: 1/16/2020  Mechanism of injury: Pt presents today with insidious onset of B/L heal pain  She states that this began in October around the same time, she presents today as Direct access for this, states would like information forwarded to Dr Shanna Mattson her Family physician, is a current pt being treated for her L hip and states that there was a change of shoe wear when the symptoms began, but she denies increasing or decreasing tennis or recreational exercise time  She states that there are periods of time without pain but at worst it can get up to 8/10 and this is usually occurs after sitting for a while, and once she stands up and begins walking but this will dissipate after 5-10 mins of walking but still awareness of symptoms is still present  Pt reports it is quite local at the back of the heal and both are equally present  Pt reports it is impacting her ability to play tennis and also her desire for recreational walking    Denies numbness or tingling into the feet or the legs, has not had formal imagery, and desires improvement to be able to participate in desired activities such as walking short distances within her home, her job and overall healthy well being  Quality of life: good    Pain  Current pain ratin  At best pain ratin  At worst pain ratin  Quality: sharp and needle-like  Relieving factors: rest, relaxation, medications and change in position  Aggravating factors: sitting, standing and stair climbing  Progression: improved      Diagnostic Tests  X-ray: normal  MRI studies: abnormal (Labral Tear and OA)  Treatments  Previous treatment: injection treatment, medication and physical therapy  Current treatment: injection treatment and medication  Patient Goals  Patient goals for therapy: decreased pain, improved balance, increased motion, increased strength and return to sport/leisure activities          Objective     Active Range of Motion   Left Hip   Flexion: 115 degrees with pain  External rotation (90/90): 45 degrees with pain  Internal rotation (90/90): 30 degrees with pain    Right Hip   Flexion: 120 degrees   External rotation (90/90): 45 degrees   Internal rotation (90/90): 45 degrees   Left Ankle/Foot   Dorsiflexion (ke): -5 degrees   Dorsiflexion (kf): 0 degrees   Plantar flexion: 65 degrees   Inversion: 40 degrees   Eversion: 25 degrees   Great toe extension: 45 degrees     Right Ankle/Foot   Dorsiflexion (ke): 0 degrees   Dorsiflexion (kf): 5 degrees   Plantar flexion: 65 degrees   Inversion: 40 degrees   Eversion: 25 degrees   Great toe extension: 30 degrees     Additional Active Range of Motion Details  Lumbar Flex 80 ext 15 , SB R 15 SB L 15 Rot L 75 Rot R 75    Fair posture, increased lordosis   Sensation intact to light touch L3,4,5,S1,S2  Genu valgus slight R > L  Mild antalgia L > R  SLS L 10" R 10"  Hip Strength  L Flex 4 Ext 5 Abd 5 Add 5  R Flex 4 Ext 5 Abd 5 Add 5  LE Screen:  Strong and painless  Palpation:  STs:   L  (-) SI compression/ Distraction, + Scour, + Neel, + FADIR + Distraction      R All (-)  TA draw 20"    Sensation intact to light touch L3,4,5,S1,S2  R ER > L,  Pes Cavus R > L  Genu valgum mild R > L  SLS L 10" R 10"  SLS HR L 5 R 3  LE Screen strong and painless  Kristen strength  L gross 5 except pf  R  Gross 5 except pf    Palpation: pain at calcaneal region, achilles insertion B     Joint Mobility  L Sub Talor G2 Navic G1 Cuboid G2  R Sub Talor G2 Navic G1 Cuboid G2                 Precautions: Hx of Melanoma, DA B foot pain    Daily Treatment Diary       Manual 1/16 1/20 2/3 2/13 2/20        L Hip Distraction G4 10 min 5 min - TS  5- TS 5  nv        L hip Abdj9  5 - TS 5 - TS 5 nv             IE for Feet        Great toe Ext and DF ST B                          Exercise Diary             Treadnmill  6 min  6 min 6 min nv        Bridges 3" x 20 3" x 20 3"  X 20  3"x  20         SLR Flex Abd Ext  2 x 10  2 x 10 (1 5# abd/ext) 2 x 10 (1 5# abd ext)          Tband 3 way hip  RTB 2 x 10 RTB 2 x 10  RTB 2 x 10         Tband Side Step  RTB 5 laps  BTB 5 laps Riaz         Bridge L LE             Tband Row + Ext   GTB GTB 2 x 10           Tband Multi Rotation  RTB nv                       AP for circulation     edu        Great toe Ext     hep        Wall ST     hep        Gastroc ST with Wedge             HR/TR     2 x 10        B SL Eversion/Inver             Mini Squat Trial                                                                              Modalities             HP/CP prn  10 min MHP  declined dec

## 2020-02-26 ENCOUNTER — OFFICE VISIT (OUTPATIENT)
Dept: PHYSICAL THERAPY | Facility: CLINIC | Age: 60
End: 2020-02-26
Payer: COMMERCIAL

## 2020-02-26 DIAGNOSIS — M79.671 FOOT PAIN, BILATERAL: Primary | ICD-10-CM

## 2020-02-26 DIAGNOSIS — M79.672 FOOT PAIN, BILATERAL: Primary | ICD-10-CM

## 2020-02-26 DIAGNOSIS — M16.12 PRIMARY LOCALIZED OSTEOARTHRITIS OF LEFT HIP: ICD-10-CM

## 2020-02-26 DIAGNOSIS — M24.152 DEGENERATIVE TEAR OF ACETABULAR LABRUM OF LEFT HIP: ICD-10-CM

## 2020-02-26 PROCEDURE — 97140 MANUAL THERAPY 1/> REGIONS: CPT | Performed by: PHYSICAL THERAPIST

## 2020-02-26 PROCEDURE — 97110 THERAPEUTIC EXERCISES: CPT | Performed by: PHYSICAL THERAPIST

## 2020-02-26 NOTE — PROGRESS NOTES
Daily Note     Today's date: 2020  Patient name: Maryjo Casey  : 1960  MRN: [id number]  Referring provider: Weston Severs  Dx:   Encounter Diagnosis     ICD-10-CM    1  Foot pain, bilateral M79 671     M79 672    2  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 M16 12    3  Primary localized osteoarthritis of left hip M16 12                   Subjective: Pt presents today stating that her heals have improved significantly  She can walk further and alternate stairs  Her hip unfortunately is about the same  Objective: See treatment diary below      Assessment: Excellent progression within the last week in regards of her ankle  Pt will benefit continued resistance and strength as able         Precautions: Hx of Melanoma, DA B foot pain    Daily Treatment Diary       Manual 1/16 1/20 2/3 2/13 2/20 2/26       L Hip Distraction G4 10 min 5 min - TS  5- TS 5  nv 5       L hip Abdj9  5 - TS 5 - TS 5 nv 5            IE for Feet        Great toe Ext and DF ST B      10                    Exercise Diary             Treadmill  6 min  6 min 6 min nv 6 min       Bridges 3" x 20 3" x 20 3"  X 20  3"x  20  3"x 20       SLR Flex Abd Ext  2 x 10  2 x 10 (1 5# abd/ext) 2 x 10 (1 5# abd ext)   2 x 10 (1 5# abd and ext)       Tband 3 way hip  RTB 2 x 10 RTB 2 x 10  RTB 2 x 10  RTB 2 x 10       Tband Side Step  RTB 5 laps  BTB 5 laps Riaz  Blue 5 laps       Bridge L LE             Tband Row + Ext   GTB GTB 2 x 10    GTB 2 x 10       Tband Multi Rotation  RTB nv   RTB nv                    AP for circulation     edu        Great toe Ext     hep        Wall ST     hep        Gastroc ST with Wedge      20" x 4       HR/TR     2 x 10 2 x 10       B SL Eversion/Inver             Mini Squat Trial      nv                                                                        Modalities             HP/CP prn  10 min MHP  declined dec

## 2020-03-02 ENCOUNTER — OFFICE VISIT (OUTPATIENT)
Dept: PHYSICAL THERAPY | Facility: CLINIC | Age: 60
End: 2020-03-02
Payer: COMMERCIAL

## 2020-03-02 DIAGNOSIS — M24.152 DEGENERATIVE TEAR OF ACETABULAR LABRUM OF LEFT HIP: ICD-10-CM

## 2020-03-02 DIAGNOSIS — M79.672 FOOT PAIN, BILATERAL: Primary | ICD-10-CM

## 2020-03-02 DIAGNOSIS — M79.671 FOOT PAIN, BILATERAL: Primary | ICD-10-CM

## 2020-03-02 DIAGNOSIS — M16.12 PRIMARY LOCALIZED OSTEOARTHRITIS OF LEFT HIP: ICD-10-CM

## 2020-03-02 PROCEDURE — 97140 MANUAL THERAPY 1/> REGIONS: CPT | Performed by: PHYSICAL THERAPIST

## 2020-03-02 PROCEDURE — 97110 THERAPEUTIC EXERCISES: CPT | Performed by: PHYSICAL THERAPIST

## 2020-03-02 PROCEDURE — 97112 NEUROMUSCULAR REEDUCATION: CPT | Performed by: PHYSICAL THERAPIST

## 2020-03-02 NOTE — PROGRESS NOTES
Daily Note     Today's date: 3/2/2020  Patient name: Freddie Pena  : 1960  MRN: [id number]  Referring provider: Terrence Homans  Dx:   Encounter Diagnosis     ICD-10-CM    1  Foot pain, bilateral M79 671     M79 672    2  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 M16 12    3  Primary localized osteoarthritis of left hip M16 12                   Subjective: Pt presents today stating that she is feeling well, compliant with HEP and trying to be as active as possible  Objective: See treatment diary below      Assessment: Proceeded with mini squats and multi rots without symptom exacerbation today  Continue to progress as able        Precautions: Hx of Melanoma, DA B foot pain    Daily Treatment Diary       Manual 1/16 1/20 2/3 2/13 2/20 2/26 3/2      L Hip Distraction G4 10 min 5 min - TS  5- TS 5  nv 5 5      L hip Abdj9  5 - TS 5 - TS 5 nv 5 5           IE for Feet        Great toe Ext and DF ST B      10 5                   Exercise Diary             Treadmill  6 min  6 min 6 min nv 6 min 6 min      Bridges 3" x 20 3" x 20 3"  X 20  3"x  20  3"x 20 3" x 20      SLR Flex Abd Ext  2 x 10  2 x 10 (1 5# abd/ext) 2 x 10 (1 5# abd ext)   2 x 10 (1 5# abd and ext) 2 x 10      Tband 3 way hip  RTB 2 x 10 RTB 2 x 10  RTB 2 x 10  RTB 2 x 10 GTB2 x 10       Tband Side Step  RTB 5 laps  BTB 5 laps Riaz  Blue 5 laps Blue 5 laps      Bridge L LE             Tband Row + Ext   GTB GTB 2 x 10    GTB 2 x 10 GTB 2 x 10      Tband Multi Rotation  RTB nv   RTB nv RTB 2 x 10                   AP for circulation     edu        Great toe Ext     hep        Wall ST     hep        Gastroc ST with Wedge      20" x 4 20" x 4      HR/TR     2 x 10 2 x 10 2 x 10      B SL Eversion/Inver             Mini Squat Trial      nv 2 x 10                                                                       Modalities             HP/CP prn  10 min MHP  declined dec

## 2020-03-04 ENCOUNTER — APPOINTMENT (OUTPATIENT)
Dept: PHYSICAL THERAPY | Facility: CLINIC | Age: 60
End: 2020-03-04
Payer: COMMERCIAL

## 2020-03-09 ENCOUNTER — OFFICE VISIT (OUTPATIENT)
Dept: PHYSICAL THERAPY | Facility: CLINIC | Age: 60
End: 2020-03-09
Payer: COMMERCIAL

## 2020-03-09 DIAGNOSIS — M79.671 FOOT PAIN, BILATERAL: Primary | ICD-10-CM

## 2020-03-09 DIAGNOSIS — M16.12 PRIMARY LOCALIZED OSTEOARTHRITIS OF LEFT HIP: ICD-10-CM

## 2020-03-09 DIAGNOSIS — M79.672 FOOT PAIN, BILATERAL: Primary | ICD-10-CM

## 2020-03-09 DIAGNOSIS — M24.152 DEGENERATIVE TEAR OF ACETABULAR LABRUM OF LEFT HIP: ICD-10-CM

## 2020-03-09 PROCEDURE — 97140 MANUAL THERAPY 1/> REGIONS: CPT | Performed by: PHYSICAL THERAPIST

## 2020-03-09 PROCEDURE — 97112 NEUROMUSCULAR REEDUCATION: CPT | Performed by: PHYSICAL THERAPIST

## 2020-03-09 PROCEDURE — 97110 THERAPEUTIC EXERCISES: CPT | Performed by: PHYSICAL THERAPIST

## 2020-03-09 NOTE — PROGRESS NOTES
Daily Note     Today's date: 3/9/2020  Patient name: Arvil Heimlich  : 1960  MRN: [id number]  Referring provider: Marcelino Andino  Dx:   Encounter Diagnosis     ICD-10-CM    1  Foot pain, bilateral M79 671     M79 672    2  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 M16 12    3  Primary localized osteoarthritis of left hip M16 12                   Subjective: Pt presents today stating that she is a little stiff, but she felt as though she was almost normal over the weekend  Very content with her progression  Objective: See treatment diary below      Assessment:  Requested to trial without hip mobs today, otherwise a strong tolerance to all of today's session without symptom exacerbation        Precautions: Hx of Melanoma, DA B foot pain    Daily Treatment Diary       Manual 1/16 1/20 2/3 2/13 2/20 2/26 3/2 3/9     L Hip Distraction G4 10 min 5 min - TS  5- TS 5  nv 5 5 hold     L hip Abd  5 - TS 5 - TS 5 nv 5 5 hold          IE for Feet        Great toe Ext and DF ST B      10 5 10                  Exercise Diary             Treadmill  6 min  6 min 6 min nv 6 min 6 min 6 min     Bridges 3" x 20 3" x 20 3"  X 20  3"x  20  3"x 20 3" x 20 3 x 20     SLR Flex Abd Ext  2 x 10  2 x 10 (1 5# abd/ext) 2 x 10 (1 5# abd ext)   2 x 10 (1 5# abd and ext) 2 x 10 2 x 10     Tband 3 way hip  RTB 2 x 10 RTB 2 x 10  RTB 2 x 10  RTB 2 x 10 GTB2 x 10  GTB 2 x 10      Tband Side Step  RTB 5 laps  BTB 5 laps Riaz  Blue 5 laps Blue 5 laps Blue 6 laps     Bridge L LE             Tband Row + Ext   GTB GTB 2 x 10    GTB 2 x 10 GTB 2 x 10 GTB 2 x 10     Tband Multi Rotation  RTB nv   RTB nv RTB 2 x 10 GTB 2 x 10     Mini Lunge        2 x 10      AP for circulation     edu        Great toe Ext     hep        Wall ST     hep        Gastroc ST with Wedge      20" x 4 20" x 4 20"x 4     HR/TR     2 x 10 2 x 10 2 x 10 2 x 10     B SL Eversion/Inver             Mini Squat Trial      nv 2 x 10 2 x 10 Modalities             HP/CP prn  10 min MHP  declined dec Karyn Brown

## 2020-03-11 ENCOUNTER — OFFICE VISIT (OUTPATIENT)
Dept: PHYSICAL THERAPY | Facility: CLINIC | Age: 60
End: 2020-03-11
Payer: COMMERCIAL

## 2020-03-11 DIAGNOSIS — M79.671 FOOT PAIN, BILATERAL: Primary | ICD-10-CM

## 2020-03-11 DIAGNOSIS — M24.152 DEGENERATIVE TEAR OF ACETABULAR LABRUM OF LEFT HIP: ICD-10-CM

## 2020-03-11 DIAGNOSIS — M79.672 FOOT PAIN, BILATERAL: Primary | ICD-10-CM

## 2020-03-11 DIAGNOSIS — M16.12 PRIMARY LOCALIZED OSTEOARTHRITIS OF LEFT HIP: ICD-10-CM

## 2020-03-11 PROCEDURE — 97140 MANUAL THERAPY 1/> REGIONS: CPT | Performed by: PHYSICAL THERAPIST

## 2020-03-11 PROCEDURE — 97110 THERAPEUTIC EXERCISES: CPT | Performed by: PHYSICAL THERAPIST

## 2020-03-11 PROCEDURE — 97112 NEUROMUSCULAR REEDUCATION: CPT | Performed by: PHYSICAL THERAPIST

## 2020-03-11 NOTE — PROGRESS NOTES
Daily Note     Today's date: 3/11/2020  Patient name: Hector Rowell  : 1960  MRN: [id number]  Referring provider: Luis Angel Wilhelm  Dx:   Encounter Diagnosis     ICD-10-CM    1  Foot pain, bilateral M79 671     M79 672    2  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 M16 12    3  Primary localized osteoarthritis of left hip M16 12                   Subjective: Pt presents today stating that she is a little stiff, but she felt as though she was almost normal over the weekend  Very content with her progression  Objective: See treatment diary below      Assessment:  Proceeded with further Dynamic activities without symptom exacerbation and re-introduced to hip distraction  Follow up with pt n v  In regards of this        Precautions: Hx of Melanoma, DA B foot pain    Daily Treatment Diary       Manual 1/16 1/20 2/3 2/13 2/20 2/26 3/2 3/9 3/11    L Hip Distraction G4 10 min 5 min - TS  5- TS 5  nv 5 5 hold 5 min    L hip Abd  5 - TS 5 - TS 5 nv 5 5 hold          IE for Feet        Great toe Ext and DF ST B      10 5 10 10 min                 Exercise Diary             Treadmill  6 min  6 min 6 min nv 6 min 6 min 6 min walk outside    Bridges 3" x 20 3" x 20 3"  X 20  3"x  20  3"x 20 3" x 20 3 x 20     SLR Flex Abd Ext  2 x 10  2 x 10 (1 5# abd/ext) 2 x 10 (1 5# abd ext)   2 x 10 (1 5# abd and ext) 2 x 10 2 x 10     Tband 3 way hip  RTB 2 x 10 RTB 2 x 10  RTB 2 x 10  RTB 2 x 10 GTB2 x 10  GTB 2 x 10  Riaz 6 laps    Tband Side Step  RTB 5 laps  BTB 5 laps Riaz  Blue 5 laps Blue 5 laps Blue 6 laps Riaz 6 laps    Bridge L LE             Tband Row + Ext   GTB GTB 2 x 10    GTB 2 x 10 GTB 2 x 10 GTB 2 x 10 Riaz 2 x 10    Tband Multi Rotation  RTB nv   RTB nv RTB 2 x 10 GTB 2 x 10 Riaz 2 x 10    Mini Lunge        2 x 10  2 x 10    AP for circulation     edu        Great toe Ext     hep        Wall ST     hep        Gastroc ST with Wedge      20" x 4 20" x 4 20"x 4 20" x 4    HR/TR     2 x 10 2 x 10 2 x 10 2 x 10 2 x 0    B SL Eversion/Inver             Mini Squat Trial      nv 2 x 10 2 x 10 2 x 10    Monster walk         4 laps                                                        Modalities             HP/CP prn  10 min MHP  declined dec

## 2020-03-16 ENCOUNTER — APPOINTMENT (OUTPATIENT)
Dept: PHYSICAL THERAPY | Facility: CLINIC | Age: 60
End: 2020-03-16
Payer: COMMERCIAL

## 2020-03-18 ENCOUNTER — APPOINTMENT (OUTPATIENT)
Dept: PHYSICAL THERAPY | Facility: CLINIC | Age: 60
End: 2020-03-18
Payer: COMMERCIAL

## 2020-03-23 ENCOUNTER — APPOINTMENT (OUTPATIENT)
Dept: PHYSICAL THERAPY | Facility: CLINIC | Age: 60
End: 2020-03-23
Payer: COMMERCIAL

## 2020-03-26 ENCOUNTER — APPOINTMENT (OUTPATIENT)
Dept: PHYSICAL THERAPY | Facility: CLINIC | Age: 60
End: 2020-03-26
Payer: COMMERCIAL

## 2020-03-30 ENCOUNTER — OFFICE VISIT (OUTPATIENT)
Dept: PHYSICAL THERAPY | Facility: CLINIC | Age: 60
End: 2020-03-30
Payer: COMMERCIAL

## 2020-04-15 NOTE — PROGRESS NOTES
PT Discharge    Today's date: 4/15/2020  Patient name: Aldo Cummins  : 1960  MRN: [id number]  Referring provider: Loretta Suresh  Dx:   Encounter Diagnosis     ICD-10-CM    1  Foot pain, bilateral M79 671     M79 672    2  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 M16 12    3  Primary localized osteoarthritis of left hip M16 12        Start Time: 1700  Stop Time: 1800  Total time in clinic (min): 60 minutes    Assessment/Plan   Pt has not been present since 3/11/2020  Pt's chart will be DC in compliance of facility policy as all Charts are DC within 30 days of last scheduled visit        Subjective    Objective    Flowsheet Rows      Most Recent Value   PT/OT G-Codes   Current Score  53   Projected Score  59

## 2020-04-28 ENCOUNTER — OFFICE VISIT (OUTPATIENT)
Dept: FAMILY MEDICINE CLINIC | Facility: CLINIC | Age: 60
End: 2020-04-28
Payer: COMMERCIAL

## 2020-04-28 VITALS
HEIGHT: 63 IN | BODY MASS INDEX: 30.87 KG/M2 | DIASTOLIC BLOOD PRESSURE: 80 MMHG | SYSTOLIC BLOOD PRESSURE: 128 MMHG | TEMPERATURE: 99 F | OXYGEN SATURATION: 98 % | WEIGHT: 174.2 LBS | HEART RATE: 67 BPM | RESPIRATION RATE: 18 BRPM

## 2020-04-28 DIAGNOSIS — Z00.00 ANNUAL PHYSICAL EXAM: Primary | ICD-10-CM

## 2020-04-28 DIAGNOSIS — F33.0 MILD EPISODE OF RECURRENT MAJOR DEPRESSIVE DISORDER (HCC): ICD-10-CM

## 2020-04-28 PROCEDURE — 99396 PREV VISIT EST AGE 40-64: CPT | Performed by: FAMILY MEDICINE

## 2020-05-18 DIAGNOSIS — F33.0 MILD EPISODE OF RECURRENT MAJOR DEPRESSIVE DISORDER (HCC): ICD-10-CM

## 2020-05-18 RX ORDER — TRAZODONE HYDROCHLORIDE 50 MG/1
TABLET ORAL
Qty: 90 TABLET | Refills: 0 | Status: SHIPPED | OUTPATIENT
Start: 2020-05-18 | End: 2020-09-11 | Stop reason: SDUPTHER

## 2020-06-08 NOTE — PSYCH
1  Depression (311) (F32 9)   2  Anxiety (300 00) (F41 9)    regular therapy       Date of Initial Treatment Plan: 4-20-17  Date of Current Treatment Plan: 4-20-17  Treatment Plan 1  Strengths/Personal Resources for Self Care: I am resilient  I am smart  Diagnosis:   Axis I: depression, anxiety   Axis II: none   Axis III: recent melanoma removed from arm     Area of Needs: I think I really need to tackle the drinking first  I need to have some kind of life partner  Long Term Goals:   Cut back on drinking and stop eventually  Target Date: 8-20-17      Find partner   Target Date: 8-20-17         Short Term Objectives:   Goal 1:   Regular therapy to work on goals, depression, recovery, healthy coping skills  Target Date: 8-20-17      Goal 2:   Have more fun with family, friends, vacations  Target Date: 8-20-17      Goal 3:   Process sadness related to the loss of her family the way it was prior to the divorce and the boys growing up  Target Date: 8-20-17      GOAL 1: Modality: Individual 2 x per month Target Date: 8-20-17       The person(s) responsible for carrying out the plan is Rene  GOAL 2: Modality: Individual 2 x per month Target Date: 8-20-17       The person(s) responsible for carrying out the plan is Rene  GOAL 3: Modality: Individual 2 x per month Target Date: 8-20-17         The person(s) responsible for carrying out the plan is Rene and Dr Steve Garrison  The first scheduled review date is 8-20-17  The expected length of service is 6-8 months  Level of functioning at initial assessment: 65  The highest level of functioning in the past year was 75  The current level of functioning is 65               CLIENT COMMENTS / Please share your thoughts, feelings, need and/or experiences regarding your treatment plan: KATE,  PATIENT RETURNING PHONE CALL.   _____________________________________________________________________________________________________________________________________________________________________________________________________________________________________________________________________________________________________________________ Date/Time: ______________     Patient Signature: _________________________________ Date/Time: ______________        1  Anxiety (300 00) (F41 9)   2  Depression (311) (F32 9)   3  Encounter for annual routine gynecological examination (V72 31) (Z01 419)   4   High cholesterol (272 0) (E78 00)     Electronically signed by : Varun Gunn, ; Apr 20 2017  9:42AM EST                       (Author)

## 2020-07-06 DIAGNOSIS — F33.0 MILD EPISODE OF RECURRENT MAJOR DEPRESSIVE DISORDER (HCC): ICD-10-CM

## 2020-07-08 RX ORDER — CITALOPRAM 40 MG/1
TABLET ORAL
Qty: 90 TABLET | Refills: 3 | Status: SHIPPED | OUTPATIENT
Start: 2020-07-08 | End: 2021-07-06 | Stop reason: SDUPTHER

## 2020-07-29 ENCOUNTER — HOSPITAL ENCOUNTER (OUTPATIENT)
Dept: RADIOLOGY | Age: 60
Discharge: HOME | End: 2020-07-29
Payer: COMMERCIAL

## 2020-07-29 VITALS — HEIGHT: 63 IN | BODY MASS INDEX: 30.83 KG/M2 | WEIGHT: 174 LBS

## 2020-07-29 DIAGNOSIS — Z12.31 ENCOUNTER FOR SCREENING MAMMOGRAM FOR BREAST CANCER: ICD-10-CM

## 2020-07-29 PROCEDURE — 77063 BREAST TOMOSYNTHESIS BI: CPT

## 2020-07-29 PROCEDURE — 77067 SCR MAMMO BI INCL CAD: CPT

## 2020-08-10 ENCOUNTER — TELEPHONE (OUTPATIENT)
Dept: OBGYN CLINIC | Facility: CLINIC | Age: 60
End: 2020-08-10

## 2020-08-10 DIAGNOSIS — M24.152 DEGENERATIVE TEAR OF ACETABULAR LABRUM OF LEFT HIP: Primary | ICD-10-CM

## 2020-08-10 NOTE — TELEPHONE ENCOUNTER
Patient called requesting a new order for a FL Injection for the left hip  She stated once the order is in there she'll schedule it herself       Best call back number [phone]

## 2020-08-17 DIAGNOSIS — M16.12 PRIMARY LOCALIZED OSTEOARTHRITIS OF LEFT HIP: Primary | ICD-10-CM

## 2020-08-17 NOTE — TELEPHONE ENCOUNTER
Fluoroscopic left hip cortisone injection was ordered please have the patient call Central scheduling to set this up

## 2020-08-18 DIAGNOSIS — Z11.9 SCREENING EXAMINATION FOR INFECTIOUS DISEASE: Primary | ICD-10-CM

## 2020-08-19 DIAGNOSIS — Z11.9 SCREENING EXAMINATION FOR INFECTIOUS DISEASE: ICD-10-CM

## 2020-08-19 PROCEDURE — U0003 INFECTIOUS AGENT DETECTION BY NUCLEIC ACID (DNA OR RNA); SEVERE ACUTE RESPIRATORY SYNDROME CORONAVIRUS 2 (SARS-COV-2) (CORONAVIRUS DISEASE [COVID-19]), AMPLIFIED PROBE TECHNIQUE, MAKING USE OF HIGH THROUGHPUT TECHNOLOGIES AS DESCRIBED BY CMS-2020-01-R: HCPCS | Performed by: FAMILY MEDICINE

## 2020-08-20 LAB — SARS-COV-2 RNA SPEC QL NAA+PROBE: NOT DETECTED

## 2020-08-31 ENCOUNTER — TRANSCRIBE ORDERS (OUTPATIENT)
Dept: RADIOLOGY | Facility: HOSPITAL | Age: 60
End: 2020-08-31

## 2020-08-31 ENCOUNTER — HOSPITAL ENCOUNTER (OUTPATIENT)
Dept: RADIOLOGY | Facility: HOSPITAL | Age: 60
Discharge: HOME | End: 2020-08-31
Admitting: RADIOLOGY
Payer: COMMERCIAL

## 2020-08-31 DIAGNOSIS — M16.12 PRIMARY LOCALIZED OSTEOARTHRITIS OF LEFT HIP: ICD-10-CM

## 2020-08-31 PROCEDURE — 77002 NEEDLE LOCALIZATION BY XRAY: CPT

## 2020-08-31 PROCEDURE — 20610 DRAIN/INJ JOINT/BURSA W/O US: CPT

## 2020-08-31 RX ORDER — LIDOCAINE HYDROCHLORIDE 10 MG/ML
10 INJECTION, SOLUTION EPIDURAL; INFILTRATION; INTRACAUDAL; PERINEURAL
Status: COMPLETED | OUTPATIENT
Start: 2020-08-31 | End: 2020-08-31

## 2020-08-31 RX ORDER — BUPIVACAINE HYDROCHLORIDE 2.5 MG/ML
10 INJECTION, SOLUTION EPIDURAL; INFILTRATION; INTRACAUDAL
Status: COMPLETED | OUTPATIENT
Start: 2020-08-31 | End: 2020-08-31

## 2020-08-31 RX ORDER — METHYLPREDNISOLONE ACETATE 80 MG/ML
80 INJECTION, SUSPENSION INTRA-ARTICULAR; INTRALESIONAL; INTRAMUSCULAR; SOFT TISSUE
Status: COMPLETED | OUTPATIENT
Start: 2020-08-31 | End: 2020-08-31

## 2020-08-31 RX ADMIN — IOHEXOL 3 ML: 300 INJECTION, SOLUTION INTRAVENOUS at 15:20

## 2020-08-31 RX ADMIN — METHYLPREDNISOLONE ACETATE 80 MG: 80 INJECTION, SUSPENSION INTRA-ARTICULAR; INTRALESIONAL; INTRAMUSCULAR; SOFT TISSUE at 15:20

## 2020-08-31 RX ADMIN — BUPIVACAINE HYDROCHLORIDE 2 ML: 2.5 INJECTION, SOLUTION EPIDURAL; INFILTRATION; INTRACAUDAL at 15:20

## 2020-08-31 RX ADMIN — LIDOCAINE HYDROCHLORIDE 2 ML: 10 INJECTION, SOLUTION EPIDURAL; INFILTRATION; INTRACAUDAL; PERINEURAL at 15:20

## 2020-09-11 DIAGNOSIS — F33.0 MILD EPISODE OF RECURRENT MAJOR DEPRESSIVE DISORDER (HCC): ICD-10-CM

## 2020-09-11 RX ORDER — TRAZODONE HYDROCHLORIDE 50 MG/1
50 TABLET ORAL
Qty: 90 TABLET | Refills: 1 | Status: SHIPPED | OUTPATIENT
Start: 2020-09-11 | End: 2021-04-29 | Stop reason: ALTCHOICE

## 2020-10-01 DIAGNOSIS — Z11.9 SCREENING EXAMINATION FOR INFECTIOUS DISEASE: Primary | ICD-10-CM

## 2020-10-02 DIAGNOSIS — Z11.9 SCREENING EXAMINATION FOR INFECTIOUS DISEASE: ICD-10-CM

## 2020-10-02 PROCEDURE — U0003 INFECTIOUS AGENT DETECTION BY NUCLEIC ACID (DNA OR RNA); SEVERE ACUTE RESPIRATORY SYNDROME CORONAVIRUS 2 (SARS-COV-2) (CORONAVIRUS DISEASE [COVID-19]), AMPLIFIED PROBE TECHNIQUE, MAKING USE OF HIGH THROUGHPUT TECHNOLOGIES AS DESCRIBED BY CMS-2020-01-R: HCPCS | Performed by: FAMILY MEDICINE

## 2020-10-03 LAB — SARS-COV-2 RNA SPEC QL NAA+PROBE: NOT DETECTED

## 2020-10-16 DIAGNOSIS — Z11.9 SCREENING EXAMINATION FOR INFECTIOUS DISEASE: Primary | ICD-10-CM

## 2020-10-19 DIAGNOSIS — Z11.9 SCREENING EXAMINATION FOR INFECTIOUS DISEASE: Primary | ICD-10-CM

## 2020-10-19 DIAGNOSIS — Z11.9 SCREENING EXAMINATION FOR INFECTIOUS DISEASE: ICD-10-CM

## 2020-10-19 PROCEDURE — U0003 INFECTIOUS AGENT DETECTION BY NUCLEIC ACID (DNA OR RNA); SEVERE ACUTE RESPIRATORY SYNDROME CORONAVIRUS 2 (SARS-COV-2) (CORONAVIRUS DISEASE [COVID-19]), AMPLIFIED PROBE TECHNIQUE, MAKING USE OF HIGH THROUGHPUT TECHNOLOGIES AS DESCRIBED BY CMS-2020-01-R: HCPCS | Performed by: FAMILY MEDICINE

## 2020-10-20 LAB — SARS-COV-2 RNA SPEC QL NAA+PROBE: NOT DETECTED

## 2020-10-22 DIAGNOSIS — Z11.9 SCREENING EXAMINATION FOR INFECTIOUS DISEASE: Primary | ICD-10-CM

## 2020-10-26 DIAGNOSIS — Z11.9 SCREENING EXAMINATION FOR INFECTIOUS DISEASE: ICD-10-CM

## 2020-10-26 PROCEDURE — U0003 INFECTIOUS AGENT DETECTION BY NUCLEIC ACID (DNA OR RNA); SEVERE ACUTE RESPIRATORY SYNDROME CORONAVIRUS 2 (SARS-COV-2) (CORONAVIRUS DISEASE [COVID-19]), AMPLIFIED PROBE TECHNIQUE, MAKING USE OF HIGH THROUGHPUT TECHNOLOGIES AS DESCRIBED BY CMS-2020-01-R: HCPCS | Performed by: FAMILY MEDICINE

## 2020-10-27 LAB — SARS-COV-2 RNA SPEC QL NAA+PROBE: NOT DETECTED

## 2020-11-02 DIAGNOSIS — Z11.9 SCREENING EXAMINATION FOR INFECTIOUS DISEASE: ICD-10-CM

## 2020-11-02 PROCEDURE — U0003 INFECTIOUS AGENT DETECTION BY NUCLEIC ACID (DNA OR RNA); SEVERE ACUTE RESPIRATORY SYNDROME CORONAVIRUS 2 (SARS-COV-2) (CORONAVIRUS DISEASE [COVID-19]), AMPLIFIED PROBE TECHNIQUE, MAKING USE OF HIGH THROUGHPUT TECHNOLOGIES AS DESCRIBED BY CMS-2020-01-R: HCPCS | Performed by: FAMILY MEDICINE

## 2020-11-03 LAB — SARS-COV-2 RNA SPEC QL NAA+PROBE: NOT DETECTED

## 2020-11-09 DIAGNOSIS — Z11.9 SCREENING EXAMINATION FOR INFECTIOUS DISEASE: ICD-10-CM

## 2020-11-09 PROCEDURE — U0003 INFECTIOUS AGENT DETECTION BY NUCLEIC ACID (DNA OR RNA); SEVERE ACUTE RESPIRATORY SYNDROME CORONAVIRUS 2 (SARS-COV-2) (CORONAVIRUS DISEASE [COVID-19]), AMPLIFIED PROBE TECHNIQUE, MAKING USE OF HIGH THROUGHPUT TECHNOLOGIES AS DESCRIBED BY CMS-2020-01-R: HCPCS | Performed by: FAMILY MEDICINE

## 2020-11-10 LAB — SARS-COV-2 RNA SPEC QL NAA+PROBE: NOT DETECTED

## 2020-11-16 ENCOUNTER — TELEPHONE (OUTPATIENT)
Dept: OBGYN CLINIC | Facility: HOSPITAL | Age: 60
End: 2020-11-16

## 2020-11-16 DIAGNOSIS — Z11.9 SCREENING EXAMINATION FOR INFECTIOUS DISEASE: ICD-10-CM

## 2020-11-16 DIAGNOSIS — M16.12 PRIMARY LOCALIZED OSTEOARTHRITIS OF LEFT HIP: Primary | ICD-10-CM

## 2020-11-16 PROCEDURE — U0003 INFECTIOUS AGENT DETECTION BY NUCLEIC ACID (DNA OR RNA); SEVERE ACUTE RESPIRATORY SYNDROME CORONAVIRUS 2 (SARS-COV-2) (CORONAVIRUS DISEASE [COVID-19]), AMPLIFIED PROBE TECHNIQUE, MAKING USE OF HIGH THROUGHPUT TECHNOLOGIES AS DESCRIBED BY CMS-2020-01-R: HCPCS | Performed by: FAMILY MEDICINE

## 2020-11-17 LAB — SARS-COV-2 RNA SPEC QL NAA+PROBE: NOT DETECTED

## 2020-11-24 ENCOUNTER — TELEPHONE (OUTPATIENT)
Dept: OBGYN CLINIC | Facility: MEDICAL CENTER | Age: 60
End: 2020-11-24

## 2020-11-25 DIAGNOSIS — Z11.9 SCREENING EXAMINATION FOR INFECTIOUS DISEASE: ICD-10-CM

## 2020-11-25 PROCEDURE — U0003 INFECTIOUS AGENT DETECTION BY NUCLEIC ACID (DNA OR RNA); SEVERE ACUTE RESPIRATORY SYNDROME CORONAVIRUS 2 (SARS-COV-2) (CORONAVIRUS DISEASE [COVID-19]), AMPLIFIED PROBE TECHNIQUE, MAKING USE OF HIGH THROUGHPUT TECHNOLOGIES AS DESCRIBED BY CMS-2020-01-R: HCPCS | Performed by: FAMILY MEDICINE

## 2020-11-26 LAB — SARS-COV-2 RNA SPEC QL NAA+PROBE: NOT DETECTED

## 2020-11-30 DIAGNOSIS — Z11.9 SCREENING EXAMINATION FOR INFECTIOUS DISEASE: ICD-10-CM

## 2020-11-30 DIAGNOSIS — Z11.9 SCREENING EXAMINATION FOR INFECTIOUS DISEASE: Primary | ICD-10-CM

## 2020-11-30 PROCEDURE — U0003 INFECTIOUS AGENT DETECTION BY NUCLEIC ACID (DNA OR RNA); SEVERE ACUTE RESPIRATORY SYNDROME CORONAVIRUS 2 (SARS-COV-2) (CORONAVIRUS DISEASE [COVID-19]), AMPLIFIED PROBE TECHNIQUE, MAKING USE OF HIGH THROUGHPUT TECHNOLOGIES AS DESCRIBED BY CMS-2020-01-R: HCPCS | Performed by: FAMILY MEDICINE

## 2020-12-01 LAB — SARS-COV-2 RNA SPEC QL NAA+PROBE: NOT DETECTED

## 2020-12-07 ENCOUNTER — HOSPITAL ENCOUNTER (OUTPATIENT)
Dept: RADIOLOGY | Facility: HOSPITAL | Age: 60
Discharge: HOME | End: 2020-12-07
Admitting: RADIOLOGY
Payer: COMMERCIAL

## 2020-12-07 ENCOUNTER — TRANSCRIBE ORDERS (OUTPATIENT)
Dept: RADIOLOGY | Facility: HOSPITAL | Age: 60
End: 2020-12-07

## 2020-12-07 DIAGNOSIS — M16.12 PRIMARY LOCALIZED OSTEOARTHRITIS OF LEFT HIP: ICD-10-CM

## 2020-12-07 DIAGNOSIS — Z11.9 SCREENING EXAMINATION FOR INFECTIOUS DISEASE: ICD-10-CM

## 2020-12-07 PROCEDURE — U0003 INFECTIOUS AGENT DETECTION BY NUCLEIC ACID (DNA OR RNA); SEVERE ACUTE RESPIRATORY SYNDROME CORONAVIRUS 2 (SARS-COV-2) (CORONAVIRUS DISEASE [COVID-19]), AMPLIFIED PROBE TECHNIQUE, MAKING USE OF HIGH THROUGHPUT TECHNOLOGIES AS DESCRIBED BY CMS-2020-01-R: HCPCS | Performed by: FAMILY MEDICINE

## 2020-12-07 PROCEDURE — 77002 NEEDLE LOCALIZATION BY XRAY: CPT

## 2020-12-07 PROCEDURE — 20610 DRAIN/INJ JOINT/BURSA W/O US: CPT

## 2020-12-07 RX ORDER — BUPIVACAINE HYDROCHLORIDE 2.5 MG/ML
10 INJECTION, SOLUTION EPIDURAL; INFILTRATION; INTRACAUDAL
Status: COMPLETED | OUTPATIENT
Start: 2020-12-07 | End: 2020-12-07

## 2020-12-07 RX ORDER — METHYLPREDNISOLONE ACETATE 80 MG/ML
80 INJECTION, SUSPENSION INTRA-ARTICULAR; INTRALESIONAL; INTRAMUSCULAR; SOFT TISSUE
Status: COMPLETED | OUTPATIENT
Start: 2020-12-07 | End: 2020-12-07

## 2020-12-07 RX ORDER — LIDOCAINE HYDROCHLORIDE 10 MG/ML
10 INJECTION, SOLUTION EPIDURAL; INFILTRATION; INTRACAUDAL; PERINEURAL
Status: COMPLETED | OUTPATIENT
Start: 2020-12-07 | End: 2020-12-07

## 2020-12-07 RX ADMIN — LIDOCAINE HYDROCHLORIDE 5 ML: 10 INJECTION, SOLUTION EPIDURAL; INFILTRATION; INTRACAUDAL; PERINEURAL at 12:58

## 2020-12-07 RX ADMIN — METHYLPREDNISOLONE ACETATE 80 MG: 80 INJECTION, SUSPENSION INTRA-ARTICULAR; INTRALESIONAL; INTRAMUSCULAR; SOFT TISSUE at 12:59

## 2020-12-07 RX ADMIN — BUPIVACAINE HYDROCHLORIDE 2 ML: 2.5 INJECTION, SOLUTION EPIDURAL; INFILTRATION; INTRACAUDAL at 12:58

## 2020-12-07 RX ADMIN — IOHEXOL 3 ML: 300 INJECTION, SOLUTION INTRAVENOUS at 12:57

## 2020-12-08 LAB — SARS-COV-2 RNA SPEC QL NAA+PROBE: NOT DETECTED

## 2020-12-28 ENCOUNTER — IMMUNIZATIONS (OUTPATIENT)
Dept: FAMILY MEDICINE CLINIC | Facility: HOSPITAL | Age: 60
End: 2020-12-28
Payer: COMMERCIAL

## 2020-12-28 DIAGNOSIS — Z23 ENCOUNTER FOR IMMUNIZATION: ICD-10-CM

## 2020-12-28 PROCEDURE — 91300 SARS-COV-2 / COVID-19 MRNA VACCINE (PFIZER-BIONTECH) 30 MCG: CPT

## 2020-12-28 PROCEDURE — 0001A SARS-COV-2 / COVID-19 MRNA VACCINE (PFIZER-BIONTECH) 30 MCG: CPT

## 2021-01-18 ENCOUNTER — IMMUNIZATIONS (OUTPATIENT)
Dept: FAMILY MEDICINE CLINIC | Facility: HOSPITAL | Age: 61
End: 2021-01-18

## 2021-01-18 DIAGNOSIS — Z23 ENCOUNTER FOR IMMUNIZATION: Primary | ICD-10-CM

## 2021-01-18 PROCEDURE — 0002A SARS-COV-2 / COVID-19 MRNA VACCINE (PFIZER-BIONTECH) 30 MCG: CPT | Performed by: NURSE PRACTITIONER

## 2021-01-18 PROCEDURE — 91300 SARS-COV-2 / COVID-19 MRNA VACCINE (PFIZER-BIONTECH) 30 MCG: CPT | Performed by: NURSE PRACTITIONER

## 2021-01-21 ENCOUNTER — TELEPHONE (OUTPATIENT)
Dept: OBGYN CLINIC | Facility: HOSPITAL | Age: 61
End: 2021-01-21

## 2021-01-21 NOTE — TELEPHONE ENCOUNTER
Patient sees Dr Liliana Vargas  Patient called because she had a FL injection of left hip and she is currently having pain again  She wants to know, if she needs to get another injection, or if she should schedule an appointment to see what other options she might have?    # [phone]

## 2021-01-28 ENCOUNTER — OFFICE VISIT (OUTPATIENT)
Dept: FAMILY MEDICINE CLINIC | Facility: CLINIC | Age: 61
End: 2021-01-28
Payer: COMMERCIAL

## 2021-01-28 VITALS
DIASTOLIC BLOOD PRESSURE: 76 MMHG | HEART RATE: 63 BPM | HEIGHT: 63 IN | SYSTOLIC BLOOD PRESSURE: 136 MMHG | BODY MASS INDEX: 30.58 KG/M2 | OXYGEN SATURATION: 99 % | TEMPERATURE: 97.7 F | WEIGHT: 172.6 LBS

## 2021-01-28 DIAGNOSIS — F33.0 MILD EPISODE OF RECURRENT MAJOR DEPRESSIVE DISORDER (HCC): ICD-10-CM

## 2021-01-28 DIAGNOSIS — F10.10 ALCOHOL ABUSE: Primary | ICD-10-CM

## 2021-01-28 PROCEDURE — 99214 OFFICE O/P EST MOD 30 MIN: CPT | Performed by: FAMILY MEDICINE

## 2021-01-28 RX ORDER — LORAZEPAM 0.5 MG/1
0.5 TABLET ORAL
Qty: 20 TABLET | Refills: 0 | Status: SHIPPED | OUTPATIENT
Start: 2021-01-28 | End: 2021-03-17 | Stop reason: SDUPTHER

## 2021-01-28 NOTE — PROGRESS NOTES
Assessment/Plan:    No problem-specific Assessment & Plan notes found for this encounter  Diagnoses and all orders for this visit:    Alcohol abuse  -     LORazepam (ATIVAN) 0 5 mg tablet; Take 1 tablet (0 5 mg total) by mouth daily at bedtime as needed for anxiety  -     Comprehensive metabolic panel  -     CBC and differential  -     Lipid Panel with Direct LDL reflex; Future    Mild episode of recurrent major depressive disorder ([facility] [address] )  Comments:  stable on celexa  continue as directed       BMI Counseling: Body mass index is 30 2 kg/m²  The BMI is above normal  Nutrition recommendations include decreasing portion sizes and encouraging healthy choices of fruits and vegetables  Exercise recommendations include moderate physical activity 150 minutes/week  No pharmacotherapy was ordered  spent 25 minutes with the patient and more than 50% was spent counseling   She will continue to follow up with AA meetings  Ativan PRN for insomnia and anxiety       Subjective:      Patient ID: Kelsie Nichols is a 61 y o  female  HPI   Here for concerns with her drinking   Drinking manhanttan cocktails 3-4 /day for the last 10 years ago  She is concerned about her drinking   Attempted to cut back in the past but unsuccessful  Last week- she felt like she had enough and wants to quit   Went to [employer] meeting 3 days ago which she found it helpful  No drinks for 3 days now since the meeting   Trouble falling and staying asleep along with some chills at night since she stopped drinking   Better with anxiety since quit drinking       The following portions of the patient's history were reviewed and updated as appropriate: allergies, current medications, past family history, past medical history, past social history, past surgical history and problem list     Review of Systems   Constitutional: Negative  HENT: Negative  Eyes: Negative  Cardiovascular: Negative  Endocrine: Negative  Genitourinary: Negative  Musculoskeletal: Negative  Neurological: Negative  Hematological: Negative  Psychiatric/Behavioral: The patient is nervous/anxious  Objective:      /76 (BP Location: Left arm, Patient Position: Sitting, Cuff Size: Adult)   Pulse 63   Temp 97 7 °F (36 5 °C) (Tympanic)   Ht 5' 3 39" (1 61 m)   Wt 78 3 kg (172 lb 9 6 oz)   SpO2 99%   BMI 30 20 kg/m²          Physical Exam  Constitutional:       Appearance: Normal appearance  Cardiovascular:      Rate and Rhythm: Normal rate and regular rhythm  Pulses: Normal pulses  Heart sounds: Normal heart sounds  Pulmonary:      Effort: Pulmonary effort is normal       Breath sounds: Normal breath sounds  Neurological:      General: No focal deficit present  Mental Status: She is alert and oriented to person, place, and time     Psychiatric:         Mood and Affect: Mood normal

## 2021-02-04 ENCOUNTER — APPOINTMENT (OUTPATIENT)
Dept: LAB | Facility: CLINIC | Age: 61
End: 2021-02-04
Payer: COMMERCIAL

## 2021-02-04 ENCOUNTER — TRANSCRIBE ORDERS (OUTPATIENT)
Dept: LAB | Facility: CLINIC | Age: 61
End: 2021-02-04

## 2021-02-04 DIAGNOSIS — F10.10 ALCOHOL ABUSE: ICD-10-CM

## 2021-02-04 LAB
ALBUMIN SERPL BCP-MCNC: 3.9 G/DL (ref 3.5–5)
ALP SERPL-CCNC: 41 U/L (ref 46–116)
ALT SERPL W P-5'-P-CCNC: 22 U/L (ref 12–78)
ANION GAP SERPL CALCULATED.3IONS-SCNC: 6 MMOL/L (ref 4–13)
AST SERPL W P-5'-P-CCNC: 13 U/L (ref 5–45)
BASOPHILS # BLD AUTO: 0.04 THOUSANDS/ΜL (ref 0–0.1)
BASOPHILS NFR BLD AUTO: 1 % (ref 0–1)
BILIRUB SERPL-MCNC: 0.7 MG/DL (ref 0.2–1)
BUN SERPL-MCNC: 14 MG/DL (ref 5–25)
CALCIUM SERPL-MCNC: 8.9 MG/DL (ref 8.3–10.1)
CHLORIDE SERPL-SCNC: 106 MMOL/L (ref 100–108)
CHOLEST SERPL-MCNC: 247 MG/DL (ref 50–200)
CO2 SERPL-SCNC: 31 MMOL/L (ref 21–32)
CREAT SERPL-MCNC: 0.71 MG/DL (ref 0.6–1.3)
EOSINOPHIL # BLD AUTO: 0.14 THOUSAND/ΜL (ref 0–0.61)
EOSINOPHIL NFR BLD AUTO: 3 % (ref 0–6)
ERYTHROCYTE [DISTWIDTH] IN BLOOD BY AUTOMATED COUNT: 12 % (ref 11.6–15.1)
EST. AVERAGE GLUCOSE BLD GHB EST-MCNC: 108 MG/DL
GFR SERPL CREATININE-BSD FRML MDRD: 93 ML/MIN/1.73SQ M
GLUCOSE P FAST SERPL-MCNC: 97 MG/DL (ref 65–99)
HBA1C MFR BLD: 5.4 %
HCT VFR BLD AUTO: 39.8 % (ref 34.8–46.1)
HDLC SERPL-MCNC: 89 MG/DL
HGB BLD-MCNC: 12.8 G/DL (ref 11.5–15.4)
IMM GRANULOCYTES # BLD AUTO: 0.01 THOUSAND/UL (ref 0–0.2)
IMM GRANULOCYTES NFR BLD AUTO: 0 % (ref 0–2)
LDLC SERPL CALC-MCNC: 142 MG/DL (ref 0–100)
LYMPHOCYTES # BLD AUTO: 2.01 THOUSANDS/ΜL (ref 0.6–4.47)
LYMPHOCYTES NFR BLD AUTO: 42 % (ref 14–44)
MCH RBC QN AUTO: 30.6 PG (ref 26.8–34.3)
MCHC RBC AUTO-ENTMCNC: 32.2 G/DL (ref 31.4–37.4)
MCV RBC AUTO: 95 FL (ref 82–98)
MONOCYTES # BLD AUTO: 0.61 THOUSAND/ΜL (ref 0.17–1.22)
MONOCYTES NFR BLD AUTO: 13 % (ref 4–12)
NEUTROPHILS # BLD AUTO: 1.92 THOUSANDS/ΜL (ref 1.85–7.62)
NEUTS SEG NFR BLD AUTO: 41 % (ref 43–75)
NRBC BLD AUTO-RTO: 0 /100 WBCS
PLATELET # BLD AUTO: 220 THOUSANDS/UL (ref 149–390)
PMV BLD AUTO: 9.3 FL (ref 8.9–12.7)
POTASSIUM SERPL-SCNC: 3.9 MMOL/L (ref 3.5–5.3)
PROT SERPL-MCNC: 7.1 G/DL (ref 6.4–8.2)
RBC # BLD AUTO: 4.18 MILLION/UL (ref 3.81–5.12)
SODIUM SERPL-SCNC: 143 MMOL/L (ref 136–145)
TRIGL SERPL-MCNC: 81 MG/DL
WBC # BLD AUTO: 4.73 THOUSAND/UL (ref 4.31–10.16)

## 2021-02-04 PROCEDURE — 85025 COMPLETE CBC W/AUTO DIFF WBC: CPT | Performed by: FAMILY MEDICINE

## 2021-02-04 PROCEDURE — 83036 HEMOGLOBIN GLYCOSYLATED A1C: CPT | Performed by: FAMILY MEDICINE

## 2021-02-04 PROCEDURE — 36415 COLL VENOUS BLD VENIPUNCTURE: CPT | Performed by: FAMILY MEDICINE

## 2021-02-04 PROCEDURE — 80061 LIPID PANEL: CPT

## 2021-02-04 PROCEDURE — 80053 COMPREHEN METABOLIC PANEL: CPT | Performed by: FAMILY MEDICINE

## 2021-02-19 ENCOUNTER — ANNUAL EXAM (OUTPATIENT)
Dept: OBGYN CLINIC | Facility: CLINIC | Age: 61
End: 2021-02-19
Payer: COMMERCIAL

## 2021-02-19 VITALS — BODY MASS INDEX: 30.03 KG/M2 | DIASTOLIC BLOOD PRESSURE: 80 MMHG | WEIGHT: 171.6 LBS | SYSTOLIC BLOOD PRESSURE: 132 MMHG

## 2021-02-19 DIAGNOSIS — Z12.31 SCREENING MAMMOGRAM, ENCOUNTER FOR: ICD-10-CM

## 2021-02-19 DIAGNOSIS — Z01.419 ENCOUNTER FOR ANNUAL ROUTINE GYNECOLOGICAL EXAMINATION: Primary | ICD-10-CM

## 2021-02-19 DIAGNOSIS — Z12.11 SCREENING FOR COLON CANCER: ICD-10-CM

## 2021-02-19 PROCEDURE — 99396 PREV VISIT EST AGE 40-64: CPT | Performed by: OBSTETRICS & GYNECOLOGY

## 2021-02-19 NOTE — PROGRESS NOTES
Assessment/Plan:  NGE  CoTesting q 5 yrs  Done, '25  Vulvar HSV I   8/19 - no recurrences              RTO 1 yr  SBA monthly  3 D Mammo  Colonoscopy q 3 yrs- 5/17 referral given (Addendum- not due to '22)  Ca 1,000 mg/d with Vit D - does  Exercise 3/wk - tennis and yoga, walking  old records- reviewed 5/2/17- normal BMD '16- repeat 65       Diagnoses and all orders for this visit:    Encounter for annual routine gynecological examination    Screening mammogram, encounter for  -     Mammo screening bilateral w 3d & cad; Future    Screening for colon cancer  -     Ambulatory referral to Gastroenterology; Future              Subjective:        Patient ID: Moni Price is a 61 y o  female  Marlene Eye is here for her annual visit  She is without any gynecologic complaints  She had a brief relapse in alcoholism  Was precipitated by depression and anxiety  She also is having a sleeping problem and restless legs recently  She was prescribed Ativan which she was reluctant to use  She did use it last night and had a much better sleep without restless legs  She is exercising daily and does yoga  The following portions of the patient's history were reviewed and updated as appropriate: She  has a past medical history of Known health problems: none and Melanoma ([facility] [address] ) (04/2018)    Patient Active Problem List    Diagnosis Date Noted    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 11/01/2019    Lateral epicondylitis of right elbow 02/13/2019    Primary localized osteoarthritis of left hip 10/31/2018    Anxiety 03/23/2017    Depression 03/23/2017    Hyperlipidemia 07/24/2014   PMH:  Menarche 13  G3, P3;  FVD 42 wks '89, 1  SAVD  '91; C/S c TL for Breech '94  R Shoulder Arthroscopy '92  Bilateral Carpal Tunnel Repair  Anxiety /Depression '00  Menopause  '10- 52  Recovered Alcoholic '14  R Sciatica '15  Hypercholesterolemia '16  Skin Fungus- early 40's  Melanoma- L upper arm 3/17   Vulvar HSV I  8/19       Labral Tear   '       Alcohol relapse-  Brief   She  has a past surgical history that includes  section; [place] tooth extraction; Shoulder surgery; SHOULDER ARTHROPLASTY (Right);  section (); FL injection left hip (arthrogram) (10/24/2018); FL injection left hip (non arthrogram) (2018); FL injection left hip (non arthrogram) (2019); FL injection left hip (non arthrogram) (2019); FL injection left hip (non arthrogram) (2019); FL injection left hip (non arthrogram) (2020); and FL injection left hip (non arthrogram) (2020)  Her family history includes Alzheimer's disease in her maternal grandmother; Arthritis in her father and mother; Breast cancer (age of onset: 79) in her maternal aunt; Diabetes in her sister; Heart attack in her father, maternal aunt, and mother; Heart disease in her father; Hyperlipidemia in her mother; Hypertension in her mother; Lung cancer in her paternal aunt; No Known Problems in her brother, brother, paternal grandfather, paternal grandmother, sister, son, son, and son; Ovarian cancer in her maternal aunt; Scoliosis in her mother; Stroke in her maternal grandfather  FH:  MA- Ovarian Ca 61  MA- Breast Ca 70  PA- Lung Ca 79  M-  Arthritis, HTN, MI d 80 10/18  F- HTN, 3rd MI 80   She  reports that she has quit smoking  She has never used smokeless tobacco  She reports previous alcohol use  She reports that she does not use drugs  SH:    '87,  '15 , he was an alcoholic too   for [employer]  Sons are doing well- Caio Tomlinson  She had a brief relapse in alcoholism   Her sons live in [place], [place] and the [facility]  She plans on going to [place] in April    Current Outpatient Medications   Medication Sig Dispense Refill    Calcium-Magnesium-Vitamin D (CALCIUM 500) 500-250-200 MG-MG-UNIT TABS Take by mouth      citalopram (CeleXA) 40 mg tablet TAKE ONE TABLET BY MOUTH EVERY DAY 90 tablet 3    LORazepam (ATIVAN) 0 5 mg tablet Take 1 tablet (0 5 mg total) by mouth daily at bedtime as needed for anxiety 20 tablet 0    Multiple Vitamin (MULTI-VITAMIN DAILY PO)       traZODone (DESYREL) 50 mg tablet Take 1 tablet (50 mg total) by mouth daily at bedtime 90 tablet 1     No current facility-administered medications for this visit  Current Outpatient Medications on File Prior to Visit   Medication Sig    Calcium-Magnesium-Vitamin D (CALCIUM 500) 500-250-200 MG-MG-UNIT TABS Take by mouth    citalopram (CeleXA) 40 mg tablet TAKE ONE TABLET BY MOUTH EVERY DAY    LORazepam (ATIVAN) 0 5 mg tablet Take 1 tablet (0 5 mg total) by mouth daily at bedtime as needed for anxiety    Multiple Vitamin (MULTI-VITAMIN DAILY PO)     traZODone (DESYREL) 50 mg tablet Take 1 tablet (50 mg total) by mouth daily at bedtime     No current facility-administered medications on file prior to visit  She is allergic to benzoin; meloxicam; and monistat [tioconazole]       Review of Systems   Constitutional: Negative for activity change, appetite change, chills, fatigue, fever and unexpected weight change  HENT: Negative for congestion, rhinorrhea, sinus pressure, sore throat and trouble swallowing  Eyes: Negative for discharge, redness, itching and visual disturbance  Respiratory: Negative for cough, chest tightness, shortness of breath and wheezing  Cardiovascular: Negative for chest pain, palpitations and leg swelling  Gastrointestinal: Negative for abdominal distention, abdominal pain, blood in stool, constipation, diarrhea, nausea and vomiting  Genitourinary: Negative for decreased urine volume, difficulty urinating, dysuria, frequency, hematuria, menstrual problem, pelvic pain, urgency, vaginal bleeding, vaginal discharge and vaginal pain  Musculoskeletal: Negative for arthralgias  Skin: Negative for rash     Neurological: Negative for weakness, light-headedness, numbness and headaches  Restless legs   Hematological: Does not bruise/bleed easily  Psychiatric/Behavioral: Positive for sleep disturbance  Negative for agitation and behavioral problems  The patient is not nervous/anxious  Objective:    Vitals:    02/19/21 0817   BP: 132/80   Weight: 77 8 kg (171 lb 9 6 oz)            Physical Exam  Vitals signs and nursing note reviewed  Constitutional:       General: She is not in acute distress  Appearance: She is well-developed  HENT:      Head: Normocephalic and atraumatic  Eyes:      General: No scleral icterus  Right eye: No discharge  Left eye: No discharge  Extraocular Movements: Extraocular movements intact  Conjunctiva/sclera: Conjunctivae normal    Neck:      Musculoskeletal: Normal range of motion and neck supple  Thyroid: No thyromegaly  Trachea: No tracheal deviation  Cardiovascular:      Rate and Rhythm: Normal rate and regular rhythm  Heart sounds: Normal heart sounds  No murmur  Pulmonary:      Effort: Pulmonary effort is normal  No respiratory distress  Breath sounds: Normal breath sounds  No wheezing  Chest:      Breasts: Breasts are symmetrical          Right: No inverted nipple, mass, nipple discharge, skin change or tenderness  Left: No inverted nipple, mass, nipple discharge, skin change or tenderness  Abdominal:      General: Bowel sounds are normal  There is no distension  Palpations: Abdomen is soft  There is no mass  Tenderness: There is no abdominal tenderness  There is no guarding or rebound  Genitourinary:     General: Normal vulva  Labia:         Right: No rash, tenderness or lesion  Left: No rash, tenderness or lesion  Vagina: Normal       Cervix: No cervical motion tenderness or discharge  Uterus: Not deviated, not enlarged and not tender  Adnexa:         Right: No mass, tenderness or fullness            Left: No mass, tenderness or fullness  Rectum: No external hemorrhoid  Comments: Urethral meatus within normal limits  Perineum within normal limits  Bladder well supported  Physiologic vaginal atrophy  Musculoskeletal: Normal range of motion  General: No tenderness  Lymphadenopathy:      Cervical: No cervical adenopathy  Skin:     General: Skin is warm and dry  Findings: No rash  Neurological:      Mental Status: She is alert and oriented to person, place, and time  Psychiatric:         Behavior: Behavior normal          Thought Content:  Thought content normal          Judgment: Judgment normal

## 2021-02-24 PROCEDURE — 88305 TISSUE EXAM BY PATHOLOGIST: CPT | Performed by: PATHOLOGY

## 2021-02-25 ENCOUNTER — LAB REQUISITION (OUTPATIENT)
Dept: LAB | Facility: HOSPITAL | Age: 61
End: 2021-02-25
Payer: COMMERCIAL

## 2021-02-25 DIAGNOSIS — D48.5 NEOPLASM OF UNCERTAIN BEHAVIOR OF SKIN: ICD-10-CM

## 2021-03-01 ENCOUNTER — TELEPHONE (OUTPATIENT)
Dept: GASTROENTEROLOGY | Facility: MEDICAL CENTER | Age: 61
End: 2021-03-01

## 2021-03-02 ENCOUNTER — TELEPHONE (OUTPATIENT)
Dept: OBGYN CLINIC | Facility: CLINIC | Age: 61
End: 2021-03-02

## 2021-03-02 NOTE — TELEPHONE ENCOUNTER
fyi- pt wants to let Dr Jemal Herrera know she is not due for her colonoscopy until 2022   She had gotten a referral from us  (do not have to return call)

## 2021-03-03 ENCOUNTER — OFFICE VISIT (OUTPATIENT)
Dept: OBGYN CLINIC | Facility: CLINIC | Age: 61
End: 2021-03-03
Payer: COMMERCIAL

## 2021-03-03 ENCOUNTER — APPOINTMENT (OUTPATIENT)
Dept: RADIOLOGY | Facility: AMBULARY SURGERY CENTER | Age: 61
End: 2021-03-03
Attending: ORTHOPAEDIC SURGERY
Payer: COMMERCIAL

## 2021-03-03 VITALS
HEART RATE: 83 BPM | WEIGHT: 171 LBS | SYSTOLIC BLOOD PRESSURE: 146 MMHG | HEIGHT: 63 IN | BODY MASS INDEX: 30.3 KG/M2 | DIASTOLIC BLOOD PRESSURE: 84 MMHG

## 2021-03-03 DIAGNOSIS — M16.12 PRIMARY LOCALIZED OSTEOARTHRITIS OF LEFT HIP: ICD-10-CM

## 2021-03-03 DIAGNOSIS — M16.12 PRIMARY LOCALIZED OSTEOARTHRITIS OF LEFT HIP: Primary | ICD-10-CM

## 2021-03-03 PROCEDURE — 99213 OFFICE O/P EST LOW 20 MIN: CPT | Performed by: ORTHOPAEDIC SURGERY

## 2021-03-03 PROCEDURE — 73502 X-RAY EXAM HIP UNI 2-3 VIEWS: CPT

## 2021-03-03 NOTE — PROGRESS NOTES
Patient Name:  Aldo Cummins  MRN:  [id number]    Assessment & Plan     Left hip moderate DJD  1  Patient has failed a trial of conservative treatment  She has a degenerative tear of the acetabular labrum and moderate osteoarthritis  We discussed arthroscopic labral debridement versus total hip arthroplasty  She wants to explore less invasive measures first, and I recommended consultation with Dr Latoya Moncada regarding arthroscopy, understanding that he will not necessarily recommend it  We also discussed anterior approach total hip arthroplasty with Dr Sam Mcgrath as an alternative, which we will pursue pending her evaluation with Dr Latoya Moncada  2  Activity modification and NSAIDs as needed  3  Follow up with me as needed  History of the Present Illness     62 y/o female who presents today for a follow up visit for her left hip  She has been treating non operatively for known osteoarthritis about the left hip with intermittent US guided steroid injections, last performed on 12/7/2020, and use of ibuprofen qhs  Patient noted only about a 2 week benefit from the steroid injection and minimal benefit from ibuprofen use  She localizes her symptoms about the groin and anterior thigh  Her symptoms are worse with ambulating and prolonged standing  General ROS:  Negative for fever or chills  Neurological ROS:  Negative for numbness or tingling  Physical Exam     /84   Pulse 83   Ht 5' 3" (1 6 m)   Wt 77 6 kg (171 lb)   BMI 30 29 kg/m²     Left hip:  Tenderness:  None  Range of motion:  Flexion:  90  ER: 30  IR:  0  Strength:  Flexion:  5/5  Abduction:  5/5  Log roll test: Positive  Impingement test:  Positive  IDALMIS test: Positive  SLR against resistance:  Positive  CV:  No lower extremity edema    Data Review     I have personally reviewed pertinent films in PACS, and my interpretation follows  XR Left Hip 3/3/2021: Moderate degenerative changes  No acute osseous abnormalities        Social History Tobacco Use    Smoking status: Former Smoker    Smokeless tobacco: Never Used   Substance Use Topics    Alcohol use: Not Currently    Drug use: No       Scribe Attestation    I,:  Keagan Hopkins am acting as a scribe while in the presence of the attending physician :       I,:  Sharona Muir MD personally performed the services described in this documentation    as scribed in my presence :

## 2021-03-17 DIAGNOSIS — F10.10 ALCOHOL ABUSE: ICD-10-CM

## 2021-03-17 RX ORDER — LORAZEPAM 0.5 MG/1
0.5 TABLET ORAL
Qty: 20 TABLET | Refills: 0 | Status: SHIPPED | OUTPATIENT
Start: 2021-03-17 | End: 2021-07-23

## 2021-03-17 NOTE — TELEPHONE ENCOUNTER
Medication:LORazepam (ATIVAN)  Dosage: 0 5 mg tablet   How Often:  Take 1 tablet (0 5 mg total) by mouth daily at bedtime as needed for anxiety  Quantity:  20  Last Office Visit: 03/25/2021  Next Office Visit:01/28/2021  Last refilled:01/28/2021  How many pills left:3  Pharmacy:   [address] [place] [address]) - [place] [address]  Phone: [phone] Fax: [phone]

## 2021-03-24 ENCOUNTER — OFFICE VISIT (OUTPATIENT)
Dept: OBGYN CLINIC | Facility: OTHER | Age: 61
End: 2021-03-24
Payer: COMMERCIAL

## 2021-03-24 VITALS
HEART RATE: 68 BPM | BODY MASS INDEX: 29.19 KG/M2 | SYSTOLIC BLOOD PRESSURE: 149 MMHG | DIASTOLIC BLOOD PRESSURE: 78 MMHG | HEIGHT: 64 IN | WEIGHT: 171 LBS

## 2021-03-24 DIAGNOSIS — M16.12 PRIMARY LOCALIZED OSTEOARTHRITIS OF LEFT HIP: ICD-10-CM

## 2021-03-24 PROCEDURE — 99214 OFFICE O/P EST MOD 30 MIN: CPT | Performed by: ORTHOPAEDIC SURGERY

## 2021-03-24 NOTE — PROGRESS NOTES
Orthopaedic Surgery - Office Note  Ana Lawrence (10 y o  female)   : 1960   MRN: [id number]  Encounter Date: 3/24/2021    Chief Complaint   Patient presents with    Left Hip - Pain       Assessment / Plan  ***    · {[facility] HQHZ:19315}  No follow-ups on file  History of Present Illness  Ana Lawrence is a 61 y o  female who presents ***    Review of Systems  {[facility] ROS COMPLETE:95231}    Physical Exam  /78   Pulse 68   Ht 5' 4" (1 626 m)   Wt 77 6 kg (171 lb)   BMI 29 35 kg/m²   Cons: Appears well  No apparent distress  Psych: Alert  Oriented x3  Mood and affect normal   Eyes: PERRLA, EOMI  Resp: Normal effort  No audible wheezing or stridor  CV: Palpable pulse  No discernable arrhythmia  {PE EDEMA:90966::"No LE edema "}  Lymph:  No palpable cervical, axillary, or inguinal lymphadenopathy  Skin: Warm  No palpable masses  No visible lesions  Neuro: Normal muscle tone  Normal and symmetric DTR's      ***    Studies Reviewed  {STUDIES REVIEWED:06367}    Procedures  {NO PROCDOC:03417}    Medical, Surgical, Family, and Social History  The patient's medical history, family history, and social history, were reviewed and updated as appropriate      Past Medical History:   Diagnosis Date    Known health problems: none     Melanoma ([facility] [address] ) 2018       Past Surgical History:   Procedure Laterality Date     SECTION       SECTION      FL INJECTION LEFT HIP (ARTHROGRAM)  10/24/2018    FL INJECTION LEFT HIP (NON ARTHROGRAM)  2018    FL INJECTION LEFT HIP (NON ARTHROGRAM)  2019    FL INJECTION LEFT HIP (NON ARTHROGRAM)  2019    FL INJECTION LEFT HIP (NON ARTHROGRAM)  2019    FL INJECTION LEFT HIP (NON ARTHROGRAM)  2020    FL INJECTION LEFT HIP (NON ARTHROGRAM)  2020    SHOULDER ARTHROPLASTY Right     1990    SHOULDER SURGERY      WISDOM TOOTH EXTRACTION         Family History   Problem Relation Age of Onset    Arthritis Mother    Fabian Choudhury Hypertension Mother     Scoliosis Mother     Hyperlipidemia Mother     Heart attack Mother     Arthritis Father     Heart disease Father         CARDIAC DISORDER    Heart attack Father     Ovarian cancer Maternal Aunt     Diabetes Sister         borderline    No Known Problems Brother     No Known Problems Son     Alzheimer's disease Maternal Grandmother     Stroke Maternal Grandfather     No Known Problems Paternal Grandmother     No Known Problems Paternal Grandfather     No Known Problems Sister     No Known Problems Brother     No Known Problems Son     No Known Problems Son     Breast cancer Maternal Aunt 79    Heart attack Maternal Aunt     Lung cancer Paternal Aunt        Social History     Occupational History     Comment: FULL-TIME EMPLOYMENT   Tobacco Use    Smoking status: Former Smoker    Smokeless tobacco: Never Used   Substance and Sexual Activity    Alcohol use: Not Currently    Drug use: No    Sexual activity: Not Currently     Birth control/protection: Post-menopausal       Allergies   Allergen Reactions    Benzoin     Meloxicam Other (See Comments)     Reddness    Monistat [Tioconazole] Hives         Current Outpatient Medications:     Calcium-Magnesium-Vitamin D (CALCIUM 500) 500-250-200 MG-MG-UNIT TABS, Take by mouth, Disp: , Rfl:     citalopram (CeleXA) 40 mg tablet, TAKE ONE TABLET BY MOUTH EVERY DAY, Disp: 90 tablet, Rfl: 3    LORazepam (ATIVAN) 0 5 mg tablet, Take 1 tablet (0 5 mg total) by mouth daily at bedtime as needed for anxiety, Disp: 20 tablet, Rfl: 0    Multiple Vitamin (MULTI-VITAMIN DAILY PO), , Disp: , Rfl:     traZODone (DESYREL) 50 mg tablet, Take 1 tablet (50 mg total) by mouth daily at bedtime, Disp: 90 tablet, Rfl: [employer]    Scribe Attestation    I,:  Lola Dang am acting as a scribe while in the presence of the attending physician :       I,:  Alfonso Gomes MD personally performed the services described in this documentation as scribed in my presence :

## 2021-03-24 NOTE — PROGRESS NOTES
Orthopaedic Surgery - Office Note  Kwesi Johnson (54 y o  female)   : 1960   MRN: [id number]  Encounter Date: 3/24/2021    Chief Complaint   Patient presents with    Left Hip - Pain       Assessment / Plan  Left hip OA    · The diagnosis and treatment options were reviewed with the patient  I do not feel that arthroscopy is a reasonable option for her given the degree of arthritis in the hip  · I have recommended continued nonsurgical treatment for arthritis  · Continue PT/HEP  · Referral to radiology for another intra-articular hip CSI  · If she fails nonsurgical treatment she may eventually be a candidate for JOSEPH  Return if symptoms worsen or fail to improve  History of Present Illness  Kwesi Johnson is a 61 y o  female who presents at the referral of Dr Reji Herzog for evaluation of left hip pain  She has had left hip and groin pain for several years  She has treated previously with PT and an CSI  These have given her transient relief  She is quite active and her pain has persisted  She had an MRI that showed degenerative changes and a labral tear  She is referred to me for evaluation of her labral tear and consideration for hip arthroscopy  She is not yet ready to consider JOSEPH  Review of Systems  Pertinent items are noted in HPI  All other systems were reviewed and are negative  Physical Exam  /78   Pulse 68   Ht 5' 4" (1 626 m)   Wt 77 6 kg (171 lb)   BMI 29 35 kg/m²   Cons: Appears well  No apparent distress  Psych: Alert  Oriented x3  Mood and affect normal   Eyes: PERRLA, EOMI  Resp: Normal effort  No audible wheezing or stridor  CV: Palpable pulse  No discernable arrhythmia  No LE edema  Lymph:  No palpable cervical, axillary, or inguinal lymphadenopathy  Skin: Warm  No palpable masses  No visible lesions  Neuro: Normal muscle tone  Normal and symmetric DTR's  Left Hip Exam  Alignment / Posture:  Normal resting hip posture   Leg lengths appear equal   Inspection:  No swelling  Palpation:  mild groin tenderness  No crepitus  ROM:  Hip Flexion 120  Hip Abduction 30  Hip ER 60  Hip IR 30  Strength:  5/5 hip flexors and abductors  5/5 quadriceps and hamstrings  Stability:  Not tested  Tests:  (+) Arnie  (+) HARLEYIR  (-) Straight leg raise  Neurovascular:  Sensation intact in DP/SP/Echols/Sa/T nerve distributions  2+ DP & PT pulses  Gait:  Antalgic  Studies Reviewed  I have personally reviewed pertinent films in PACS  XR of left hip - moderate to severe degenerative changes with joint space narrowing and osteophytes    Procedures  No procedures today  Medical, Surgical, Family, and Social History  The patient's medical history, family history, and social history, were reviewed and updated as appropriate      Past Medical History:   Diagnosis Date    Known health problems: none     Melanoma ([facility] [address] ) 2018       Past Surgical History:   Procedure Laterality Date     SECTION       SECTION      FL INJECTION LEFT HIP (ARTHROGRAM)  10/24/2018    FL INJECTION LEFT HIP (NON ARTHROGRAM)  2018    FL INJECTION LEFT HIP (NON ARTHROGRAM)  2019    FL INJECTION LEFT HIP (NON ARTHROGRAM)  2019    FL INJECTION LEFT HIP (NON ARTHROGRAM)  2019    FL INJECTION LEFT HIP (NON ARTHROGRAM)  2020    FL INJECTION LEFT HIP (NON ARTHROGRAM)  2020    SHOULDER ARTHROPLASTY Right     1990    SHOULDER SURGERY      WISDOM TOOTH EXTRACTION         Family History   Problem Relation Age of Onset    Arthritis Mother     Hypertension Mother     Scoliosis Mother     Hyperlipidemia Mother     Heart attack Mother     Arthritis Father     Heart disease Father         CARDIAC DISORDER    Heart attack Father     Ovarian cancer Maternal Aunt     Diabetes Sister         borderline    No Known Problems Brother     No Known Problems Son     Alzheimer's disease Maternal Grandmother     Stroke Maternal Grandfather  No Known Problems Paternal Grandmother     No Known Problems Paternal Grandfather     No Known Problems Sister     No Known Problems Brother     No Known Problems Son     No Known Problems Son     Breast cancer Maternal Aunt 79    Heart attack Maternal Aunt     Lung cancer Paternal Aunt        Social History     Occupational History     Comment: FULL-TIME EMPLOYMENT   Tobacco Use    Smoking status: Former Smoker    Smokeless tobacco: Never Used   Substance and Sexual Activity    Alcohol use: Not Currently    Drug use: No    Sexual activity: Not Currently     Birth control/protection: Post-menopausal       Allergies   Allergen Reactions    Benzoin     Meloxicam Other (See Comments)     Reddness    Monistat [Tioconazole] Hives         Current Outpatient Medications:     Calcium-Magnesium-Vitamin D (CALCIUM 500) 500-250-200 MG-MG-UNIT TABS, Take by mouth, Disp: , Rfl:     citalopram (CeleXA) 40 mg tablet, TAKE ONE TABLET BY MOUTH EVERY DAY, Disp: 90 tablet, Rfl: 3    LORazepam (ATIVAN) 0 5 mg tablet, Take 1 tablet (0 5 mg total) by mouth daily at bedtime as needed for anxiety, Disp: 20 tablet, Rfl: 0    Multiple Vitamin (MULTI-VITAMIN DAILY PO), , Disp: , Rfl:     traZODone (DESYREL) 50 mg tablet, Take 1 tablet (50 mg total) by mouth daily at bedtime, Disp: 90 tablet, Rfl: 1      Gaston Crowell MD    Scribe Attestation    I,:   am acting as a scribe while in the presence of the attending physician :       I,:   personally performed the services described in this documentation    as scribed in my presence :

## 2021-03-25 ENCOUNTER — OFFICE VISIT (OUTPATIENT)
Dept: FAMILY MEDICINE CLINIC | Facility: CLINIC | Age: 61
End: 2021-03-25
Payer: COMMERCIAL

## 2021-03-25 VITALS
DIASTOLIC BLOOD PRESSURE: 88 MMHG | OXYGEN SATURATION: 96 % | RESPIRATION RATE: 16 BRPM | WEIGHT: 173.6 LBS | HEIGHT: 63 IN | HEART RATE: 67 BPM | BODY MASS INDEX: 30.76 KG/M2 | TEMPERATURE: 97.9 F | SYSTOLIC BLOOD PRESSURE: 130 MMHG

## 2021-03-25 DIAGNOSIS — R25.2 LEG CRAMPS: ICD-10-CM

## 2021-03-25 DIAGNOSIS — F41.9 ANXIETY: ICD-10-CM

## 2021-03-25 DIAGNOSIS — F10.10 ALCOHOL ABUSE: Primary | ICD-10-CM

## 2021-03-25 DIAGNOSIS — G25.81 RESTLESS LEG SYNDROME: ICD-10-CM

## 2021-03-25 PROCEDURE — 99214 OFFICE O/P EST MOD 30 MIN: CPT | Performed by: FAMILY MEDICINE

## 2021-03-25 RX ORDER — PRAMIPEXOLE DIHYDROCHLORIDE 0.25 MG/1
0.25 TABLET ORAL DAILY
Qty: 90 TABLET | Refills: 0 | Status: SHIPPED | OUTPATIENT
Start: 2021-03-25 | End: 2021-06-21

## 2021-03-25 NOTE — PROGRESS NOTES
Assessment/Plan:    No problem-specific Assessment & Plan notes found for this encounter  Diagnoses and all orders for this visit:    Alcohol abuse  Comments:  recommended rehab/retreat for this to become sober  Orders:  -     Vitamin B12; Future    Anxiety  Comments:  stable on citalopram     Restless leg syndrome  -     pramipexole (MIRAPEX) 0 25 mg tablet; Take 1 tablet (0 25 mg total) by mouth daily  -     Comprehensive metabolic panel  -     Iron; Future    Leg cramps  -     Vitamin B12; Future          Subjective:      Patient ID: Kelsie Nichols is a 61 y o  female  HPI     Bilateral leg cramps and has to constantly move her legs for the last 5-6 weeks  Depends on the activity- she feels better where she moves around a lot and feels worse at night and at rest   Still drinks 2 alcohol drinks at night  Seeing therapist for her alcohol abuse       The following portions of the patient's history were reviewed and updated as appropriate: allergies, current medications, past family history, past medical history, past social history, past surgical history and problem list     Review of Systems   Constitutional: Negative  HENT: Negative  Respiratory: Negative  Cardiovascular: Negative  Musculoskeletal: Negative for joint swelling  Leg cramps   Neurological: Negative  Hematological: Negative  Psychiatric/Behavioral: Negative  Objective:      /88 (BP Location: Left arm, Patient Position: Sitting, Cuff Size: Adult)   Pulse 67   Temp 97 9 °F (36 6 °C) (Tympanic)   Resp 16   Ht 5' 3 15" (1 604 m)   Wt 78 7 kg (173 lb 9 6 oz)   SpO2 96%   BMI 30 61 kg/m²          Physical Exam  Constitutional:       Appearance: Normal appearance  Cardiovascular:      Pulses: Normal pulses  Heart sounds: Normal heart sounds  Musculoskeletal:         General: No swelling or tenderness  Neurological:      General: No focal deficit present        Mental Status: She is alert and oriented to person, place, and time     Psychiatric:         Mood and Affect: Mood normal          Behavior: Behavior normal

## 2021-03-27 ENCOUNTER — APPOINTMENT (OUTPATIENT)
Dept: LAB | Facility: CLINIC | Age: 61
End: 2021-03-27
Payer: COMMERCIAL

## 2021-03-27 DIAGNOSIS — F10.10 ALCOHOL ABUSE: ICD-10-CM

## 2021-03-27 DIAGNOSIS — G25.81 RESTLESS LEG SYNDROME: ICD-10-CM

## 2021-03-27 DIAGNOSIS — R25.2 LEG CRAMPS: ICD-10-CM

## 2021-03-27 LAB
ALBUMIN SERPL BCP-MCNC: 4.2 G/DL (ref 3.5–5)
ALP SERPL-CCNC: 47 U/L (ref 46–116)
ALT SERPL W P-5'-P-CCNC: 26 U/L (ref 12–78)
ANION GAP SERPL CALCULATED.3IONS-SCNC: 7 MMOL/L (ref 4–13)
AST SERPL W P-5'-P-CCNC: 19 U/L (ref 5–45)
BILIRUB SERPL-MCNC: 0.78 MG/DL (ref 0.2–1)
BUN SERPL-MCNC: 21 MG/DL (ref 5–25)
CALCIUM SERPL-MCNC: 9.6 MG/DL (ref 8.3–10.1)
CHLORIDE SERPL-SCNC: 102 MMOL/L (ref 100–108)
CO2 SERPL-SCNC: 31 MMOL/L (ref 21–32)
CREAT SERPL-MCNC: 0.76 MG/DL (ref 0.6–1.3)
GFR SERPL CREATININE-BSD FRML MDRD: 86 ML/MIN/1.73SQ M
GLUCOSE SERPL-MCNC: 93 MG/DL (ref 65–140)
IRON SERPL-MCNC: 84 UG/DL (ref 50–170)
POTASSIUM SERPL-SCNC: 4.3 MMOL/L (ref 3.5–5.3)
PROT SERPL-MCNC: 7.6 G/DL (ref 6.4–8.2)
SODIUM SERPL-SCNC: 140 MMOL/L (ref 136–145)
VIT B12 SERPL-MCNC: 534 PG/ML (ref 100–900)

## 2021-03-27 PROCEDURE — 82607 VITAMIN B-12: CPT

## 2021-03-27 PROCEDURE — 36415 COLL VENOUS BLD VENIPUNCTURE: CPT | Performed by: FAMILY MEDICINE

## 2021-03-27 PROCEDURE — 80053 COMPREHEN METABOLIC PANEL: CPT | Performed by: FAMILY MEDICINE

## 2021-03-27 PROCEDURE — 83540 ASSAY OF IRON: CPT

## 2021-04-13 ENCOUNTER — HOSPITAL ENCOUNTER (OUTPATIENT)
Dept: RADIOLOGY | Facility: HOSPITAL | Age: 61
Discharge: HOME | End: 2021-04-13
Attending: ORTHOPAEDIC SURGERY | Admitting: RADIOLOGY
Payer: COMMERCIAL

## 2021-04-13 ENCOUNTER — TRANSCRIBE ORDERS (OUTPATIENT)
Dept: ADMINISTRATIVE | Facility: HOSPITAL | Age: 61
End: 2021-04-13

## 2021-04-13 DIAGNOSIS — M16.12 PRIMARY LOCALIZED OSTEOARTHRITIS OF LEFT HIP: ICD-10-CM

## 2021-04-13 PROCEDURE — 77002 NEEDLE LOCALIZATION BY XRAY: CPT

## 2021-04-13 PROCEDURE — 20610 DRAIN/INJ JOINT/BURSA W/O US: CPT

## 2021-04-13 RX ORDER — METHYLPREDNISOLONE ACETATE 80 MG/ML
80 INJECTION, SUSPENSION INTRA-ARTICULAR; INTRALESIONAL; INTRAMUSCULAR; SOFT TISSUE
Status: COMPLETED | OUTPATIENT
Start: 2021-04-13 | End: 2021-04-13

## 2021-04-13 RX ORDER — LIDOCAINE HYDROCHLORIDE 10 MG/ML
10 INJECTION, SOLUTION EPIDURAL; INFILTRATION; INTRACAUDAL; PERINEURAL
Status: COMPLETED | OUTPATIENT
Start: 2021-04-13 | End: 2021-04-13

## 2021-04-13 RX ORDER — BUPIVACAINE HYDROCHLORIDE 2.5 MG/ML
5 INJECTION, SOLUTION EPIDURAL; INFILTRATION; INTRACAUDAL
Status: COMPLETED | OUTPATIENT
Start: 2021-04-13 | End: 2021-04-13

## 2021-04-13 RX ADMIN — LIDOCAINE HYDROCHLORIDE 10 ML: 10 INJECTION, SOLUTION EPIDURAL; INFILTRATION; INTRACAUDAL; PERINEURAL at 12:34

## 2021-04-13 RX ADMIN — BUPIVACAINE HYDROCHLORIDE 5 ML: 2.5 INJECTION, SOLUTION EPIDURAL; INFILTRATION; INTRACAUDAL at 12:33

## 2021-04-13 RX ADMIN — METHYLPREDNISOLONE ACETATE 80 MG: 80 INJECTION, SUSPENSION INTRA-ARTICULAR; INTRALESIONAL; INTRAMUSCULAR; SOFT TISSUE at 12:35

## 2021-04-13 RX ADMIN — IOHEXOL 3 ML: 300 INJECTION, SOLUTION INTRAVENOUS at 12:33

## 2021-04-29 ENCOUNTER — OFFICE VISIT (OUTPATIENT)
Dept: FAMILY MEDICINE CLINIC | Facility: CLINIC | Age: 61
End: 2021-04-29
Payer: COMMERCIAL

## 2021-04-29 VITALS
DIASTOLIC BLOOD PRESSURE: 72 MMHG | TEMPERATURE: 97.8 F | RESPIRATION RATE: 16 BRPM | OXYGEN SATURATION: 97 % | WEIGHT: 176.2 LBS | SYSTOLIC BLOOD PRESSURE: 122 MMHG | HEART RATE: 66 BPM | HEIGHT: 63 IN | BODY MASS INDEX: 31.22 KG/M2

## 2021-04-29 DIAGNOSIS — Z00.00 ANNUAL PHYSICAL EXAM: Primary | ICD-10-CM

## 2021-04-29 DIAGNOSIS — Z23 ENCOUNTER FOR ADMINISTRATION OF VACCINE: ICD-10-CM

## 2021-04-29 PROCEDURE — 90670 PCV13 VACCINE IM: CPT

## 2021-04-29 PROCEDURE — 90471 IMMUNIZATION ADMIN: CPT

## 2021-04-29 PROCEDURE — 99396 PREV VISIT EST AGE 40-64: CPT | Performed by: FAMILY MEDICINE

## 2021-04-29 NOTE — PATIENT INSTRUCTIONS

## 2021-04-29 NOTE — PROGRESS NOTES
[facility]    NAME: Nora Parrish  AGE: 61 y o  SEX: female  : 1960     DATE: 2021     Assessment and Plan:     Problem List Items Addressed This Visit     None      Visit Diagnoses     Annual physical exam    -  Primary    Encounter for administration of vaccine        Relevant Orders    PNEUMOCOCCAL CONJUGATE VACCINE 13-VALENT GREATER THAN 6 MONTHS          Immunizations and preventive care screenings were discussed with patient today  Appropriate education was printed on patient's after visit summary  Counseling:  Alcohol/drug use: discussed moderation in alcohol intake, the recommendations for healthy alcohol use, and avoidance of illicit drug use  Dental Health: discussed importance of regular tooth brushing, flossing, and dental visits  Injury prevention: discussed safety/seat belts, safety helmets, smoke detectors, carbon dioxide detectors, and smoking near bedding or upholstery  Sexual health: discussed sexually transmitted diseases, partner selection, use of condoms, avoidance of unintended pregnancy, and contraceptive alternatives  · Exercise: the importance of regular exercise/physical activity was discussed  Recommend exercise 3-5 times per week for at least 30 minutes  No follow-ups on file  Chief Complaint:     Chief Complaint   Patient presents with    Physical Exam     Patient is here today for an annual exam       History of Present Illness:     Adult Annual Physical   Patient here for a comprehensive physical exam  The patient reports problems - restless leg symptoms are better with mirapex  Diet and Physical Activity  · Diet/Nutrition: well balanced diet and consuming 3-5 servings of fruits/vegetables daily  · Exercise: walking        Depression Screening  PHQ-9 Depression Screening    PHQ-9:   Frequency of the following problems over the past two weeks:           General Health  · Sleep: sleeps well and gets 7-8 hours of sleep on average  · Hearing: normal - bilateral   · Vision: goes for regular eye exams and wears glasses  · Dental: regular dental visits and brushes teeth twice daily  /GYN Health  · Patient is: postmenopausal  · Last menstrual period: 10 years ago   · Contraceptive method: none  Review of Systems:     Review of Systems   Constitutional: Negative for chills and fever  HENT: Negative for ear pain and sore throat  Eyes: Negative  Negative for pain and visual disturbance  Respiratory: Negative for cough and shortness of breath  Cardiovascular: Negative for chest pain and palpitations  Gastrointestinal: Negative for abdominal pain and vomiting  Endocrine: Negative  Genitourinary: Negative for dysuria and hematuria  Musculoskeletal: Negative for arthralgias and back pain  Skin: Negative for color change and rash  Neurological: Negative for seizures and syncope  All other systems reviewed and are negative       Past Medical History:     Past Medical History:   Diagnosis Date    Known health problems: none     Melanoma ([facility] [address] ) 2018      Past Surgical History:     Past Surgical History:   Procedure Laterality Date     SECTION       SECTION      FL INJECTION LEFT HIP (ARTHROGRAM)  10/24/2018    FL INJECTION LEFT HIP (NON ARTHROGRAM)  2018    FL INJECTION LEFT HIP (NON ARTHROGRAM)  2019    FL INJECTION LEFT HIP (NON ARTHROGRAM)  2019    FL INJECTION LEFT HIP (NON ARTHROGRAM)  2019    FL INJECTION LEFT HIP (NON ARTHROGRAM)  2020    FL INJECTION LEFT HIP (NON ARTHROGRAM)  2020    FL INJECTION LEFT HIP (NON ARTHROGRAM)  2021    SHOULDER ARTHROPLASTY Right     1990    SHOULDER SURGERY      WISDOM TOOTH EXTRACTION        Social History:     E-Cigarette/Vaping    E-Cigarette Use Never User      E-Cigarette/Vaping Substances    Nicotine No     THC No     CBD No     Flavoring No     Other No     Unknown No      Social History     Socioeconomic History    Marital status:      Spouse name: None    Number of children: 3    Years of education: None    Highest education level: None   Occupational History     Comment: FULL-TIME EMPLOYMENT   Social Needs    Financial resource strain: None    Food insecurity     Worry: None     Inability: None    Transportation needs     Medical: None     Non-medical: None   Tobacco Use    Smoking status: Former Smoker    Smokeless tobacco: Never Used   Substance and Sexual Activity    Alcohol use: Yes     Frequency: 2-3 times a week     Drinks per session: 1 or 2     Binge frequency: Never     Comment: social    Drug use: No    Sexual activity: Not Currently     Birth control/protection: Post-menopausal   Lifestyle    Physical activity     Days per week: None     Minutes per session: None    Stress: None   Relationships    Social connections     Talks on phone: None     Gets together: None     Attends Jewish service: None     Active member of club or organization: None     Attends meetings of clubs or organizations: None     Relationship status: None    Intimate partner violence     Fear of current or ex partner: None     Emotionally abused: None     Physically abused: None     Forced sexual activity: None   Other Topics Concern    None   Social History Narrative    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, 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    EXERCISES 3 TO 4 TIMES PER WEEK      Family History:     Family History   Problem Relation Age of Onset    Arthritis Mother     Hypertension Mother     Scoliosis Mother     Hyperlipidemia Mother     Heart attack Mother     Arthritis Father     Heart disease Father         CARDIAC DISORDER    Heart attack Father     Ovarian cancer Maternal Aunt     Diabetes Sister         borderline    No Known Problems Brother     No Known Problems Son     Alzheimer's disease Maternal Grandmother     Stroke Maternal Grandfather     No Known Problems Paternal Grandmother     No Known Problems Paternal Grandfather     No Known Problems Sister     No Known Problems Brother     No Known Problems Son     No Known Problems Son     Breast cancer Maternal Aunt 79    Heart attack Maternal Aunt     Lung cancer Paternal Aunt       Current Medications:     Current Outpatient Medications   Medication Sig Dispense Refill    Calcium-Magnesium-Vitamin D (CALCIUM 500) 500-250-200 MG-MG-UNIT TABS Take by mouth      citalopram (CeleXA) 40 mg tablet TAKE ONE TABLET BY MOUTH EVERY DAY 90 tablet 3    LORazepam (ATIVAN) 0 5 mg tablet Take 1 tablet (0 5 mg total) by mouth daily at bedtime as needed for anxiety 20 tablet 0    Multiple Vitamin (MULTI-VITAMIN DAILY PO)       pramipexole (MIRAPEX) 0 25 mg tablet Take 1 tablet (0 25 mg total) by mouth daily 90 tablet 0     No current facility-administered medications for this visit  Allergies: Allergies   Allergen Reactions    Benzoin     Meloxicam Other (See Comments)     Reddness    Monistat [Tioconazole] Hives      Physical Exam:     /72 (BP Location: Left arm, Patient Position: Sitting, Cuff Size: Adult)   Pulse 66   Temp 97 8 °F (36 6 °C) (Tympanic)   Resp 16   Ht 5' 2 99" (1 6 m)   Wt 79 9 kg (176 lb 3 2 oz)   SpO2 97%   BMI 31 22 kg/m²     Physical Exam  Vitals signs and nursing note reviewed  Constitutional:       General: She is not in acute distress  Appearance: She is well-developed  HENT:      Head: Normocephalic and atraumatic  Right Ear: Tympanic membrane normal       Left Ear: Tympanic membrane normal       Nose: Nose normal       Mouth/Throat:      Mouth: Mucous membranes are moist    Eyes:      Conjunctiva/sclera: Conjunctivae normal    Neck:      Musculoskeletal: Normal range of motion and neck supple  Cardiovascular:      Rate and Rhythm: Normal rate and regular rhythm  Pulses: Normal pulses  Heart sounds:  No murmur  Pulmonary:      Effort: Pulmonary effort is normal  No respiratory distress  Breath sounds: Normal breath sounds  Abdominal:      Palpations: Abdomen is soft  Tenderness: There is no abdominal tenderness  Musculoskeletal: Normal range of motion  Skin:     General: Skin is warm and dry  Neurological:      General: No focal deficit present  Mental Status: She is alert and oriented to person, place, and time     Psychiatric:         Mood and Affect: Mood normal          Behavior: Behavior normal           Giorgi Guerrero MD  [address] [facility]

## 2021-06-21 DIAGNOSIS — G25.81 RESTLESS LEG SYNDROME: ICD-10-CM

## 2021-06-21 RX ORDER — PRAMIPEXOLE DIHYDROCHLORIDE 0.25 MG/1
TABLET ORAL
Qty: 90 TABLET | Refills: 0 | Status: SHIPPED | OUTPATIENT
Start: 2021-06-21 | End: 2021-09-07

## 2021-07-06 DIAGNOSIS — F33.0 MILD EPISODE OF RECURRENT MAJOR DEPRESSIVE DISORDER (HCC): ICD-10-CM

## 2021-07-06 RX ORDER — CITALOPRAM 40 MG/1
40 TABLET ORAL DAILY
Qty: 90 TABLET | Refills: 3 | Status: SHIPPED | OUTPATIENT
Start: 2021-07-06 | End: 2022-07-05

## 2021-07-22 DIAGNOSIS — F10.10 ALCOHOL ABUSE: ICD-10-CM

## 2021-07-23 RX ORDER — LORAZEPAM 0.5 MG/1
TABLET ORAL
Qty: 20 TABLET | Refills: 0 | Status: SHIPPED | OUTPATIENT
Start: 2021-07-23 | End: 2021-08-13

## 2021-07-23 NOTE — TELEPHONE ENCOUNTER
Medication:  PDMP  03/17/2021  1  03/17/2021  LORAZEPAM 0 5 MG TABLET  20 0  20      Active agreement on file -No

## 2021-08-10 ENCOUNTER — OFFICE VISIT (OUTPATIENT)
Dept: OBGYN CLINIC | Facility: HOSPITAL | Age: 61
End: 2021-08-10
Payer: COMMERCIAL

## 2021-08-10 VITALS
HEIGHT: 63 IN | WEIGHT: 179.8 LBS | BODY MASS INDEX: 31.86 KG/M2 | DIASTOLIC BLOOD PRESSURE: 87 MMHG | HEART RATE: 68 BPM | SYSTOLIC BLOOD PRESSURE: 138 MMHG

## 2021-08-10 DIAGNOSIS — M16.12 PRIMARY LOCALIZED OSTEOARTHRITIS OF LEFT HIP: Primary | ICD-10-CM

## 2021-08-10 DIAGNOSIS — M24.152 DEGENERATIVE TEAR OF ACETABULAR LABRUM OF LEFT HIP: ICD-10-CM

## 2021-08-10 PROCEDURE — 99214 OFFICE O/P EST MOD 30 MIN: CPT | Performed by: ORTHOPAEDIC SURGERY

## 2021-08-10 NOTE — PROGRESS NOTES
Assessment:   Diagnosis ICD-10-CM Associated Orders   1  Primary localized osteoarthritis of left hip  M16 12    2  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  M24 152        Plan:    * Xrays of the left hip were reviewed from 3/2021  * Non operative treatments that the patient has completed has not been successful with resolving her symptoms  * The benefits and purpose of the operation and or procedure have been explained to me in language I understand by Dr Alejandro Finney as well the risks, alternatives and complications pertaining to the above procedure/surgery which include but is not limited to : infections, deep vein thrombosis, blood clots to the lungs, wound healing problems, complications, for extensive blood loss, including shock, possible death, leg length discrepancy, limp hip dislocations, fracture, loss of limb, persistent pain, failure of hardware/implant, damage of blood vessels and or nerves, heart attack, stroke and potential need for further surgery/revision surgery  * Post-operative PT is recommended initially 2 weeks with xrays  * Explained that PT will help with gait, strength and endurance back  * Patient had post op nausea after a shoulder arthroscopy    To do next visit:  Return for Follow up post- op  The above stated was discussed in layman's terms and the patient expressed understanding  All questions were answered to the patient's satisfaction  Scribe Attestation    I,:  Rima No am acting as a scribe while in the presence of the attending physician :       I,:  Karen Washington MD personally performed the services described in this documentation    as scribed in my presence :             Subjective: Agnes Motley is a 61 y o  female who presents today for an evaluation for left hip moderate arthritis   Patient has been  previously treated by Dr Patricia Grigsby conservatively with an intra-articular hip injection last performed on 12/07/2020, which gave her 2 weeks of benefit  She has been taking ibuprofen as needed for pain discomfort  She notes that most of her pain is located within the anterior thigh and groin  She states that the the hip is starting to throb at night  She gets a sharp pain within the groin  Everyday things have become difficult like putting on her shoes,   Patient was evaluated by Dr Rafia Almodovar as well to see if she was an arthroscopy candidate  She feels that theAvieong   She works at [employer] workers  Review of systems negative unless otherwise specified in HPI  Review of Systems   Constitutional: Negative for chills, fever and unexpected weight change  HENT: Negative for hearing loss, nosebleeds and sore throat  Eyes: Negative for pain, redness and visual disturbance  Respiratory: Negative for cough, shortness of breath and wheezing  Cardiovascular: Negative for chest pain, palpitations and leg swelling  Gastrointestinal: Negative for abdominal pain and nausea  Genitourinary: Negative for dyspareunia, dysuria and frequency  Skin: Negative for rash and wound  Neurological: Negative for dizziness, numbness and headaches  Psychiatric/Behavioral: Negative for decreased concentration and suicidal ideas  The patient is not nervous/anxious          Past Medical History:   Diagnosis Date    Known health problems: none     Melanoma ([facility] [address] ) 2018       Past Surgical History:   Procedure Laterality Date     SECTION       SECTION      FL INJECTION LEFT HIP (ARTHROGRAM)  10/24/2018    FL INJECTION LEFT HIP (NON ARTHROGRAM)  2018    FL INJECTION LEFT HIP (NON ARTHROGRAM)  2019    FL INJECTION LEFT HIP (NON ARTHROGRAM)  2019    FL INJECTION LEFT HIP (NON ARTHROGRAM)  2019    FL INJECTION LEFT HIP (NON ARTHROGRAM)  2020    FL INJECTION LEFT HIP (NON ARTHROGRAM)  2020    FL INJECTION LEFT HIP (NON ARTHROGRAM)  2021    SHOULDER ARTHROPLASTY Right     1990    SHOULDER SURGERY      WISDOM TOOTH EXTRACTION         Family History   Problem Relation Age of Onset    Arthritis Mother     Hypertension Mother     Scoliosis Mother     Hyperlipidemia Mother     Heart attack Mother     Arthritis Father     Heart disease Father         CARDIAC DISORDER    Heart attack Father     Ovarian cancer Maternal Aunt     Diabetes Sister         borderline    No Known Problems Brother     No Known Problems Son     Alzheimer's disease Maternal Grandmother     Stroke Maternal Grandfather     No Known Problems Paternal Grandmother     No Known Problems Paternal Grandfather     No Known Problems Sister     No Known Problems Brother     No Known Problems Son     No Known Problems Son     Breast cancer Maternal Aunt 79    Heart attack Maternal Aunt     Lung cancer Paternal Aunt        Social History     Occupational History     Comment: FULL-TIME EMPLOYMENT   Tobacco Use    Smoking status: Former Smoker    Smokeless tobacco: Never Used   Vaping Use    Vaping Use: Never used   Substance and Sexual Activity    Alcohol use: Yes     Comment: social    Drug use: No    Sexual activity: Not Currently     Birth control/protection: Post-menopausal         Current Outpatient Medications:     Calcium-Magnesium-Vitamin D (CALCIUM 500) 500-250-200 MG-MG-UNIT TABS, Take by mouth, Disp: , Rfl:     Capsaicin-Menthol (SALONPAS GEL EX), Apply topically, Disp: , Rfl:     citalopram (CeleXA) 40 mg tablet, Take 1 tablet (40 mg total) by mouth daily, Disp: 90 tablet, Rfl: 3    LORazepam (ATIVAN) 0 5 mg tablet, TAKE ONE TABLET BY MOUTH ONCE DAILY AT BEDTIME AS NEEDED FOR ANXIETY, Disp: 20 tablet, Rfl: 0    Multiple Vitamin (MULTI-VITAMIN DAILY PO), , Disp: , Rfl:     pramipexole (MIRAPEX) 0 25 mg tablet, TAKE ONE TABLET BY MOUTH DAILY, Disp: 90 tablet, Rfl: 0    Allergies   Allergen Reactions    Benzoin     Meloxicam Other (See Comments)     Reddness    Monistat [Tioconazole] Hives    Nickel Itching and Rash            Vitals:    08/10/21 1326   BP: 138/87   Pulse: 68       Objective:                    Left Hip Exam     Tenderness   The patient is experiencing tenderness in the anterior (Groin)  Range of Motion   Flexion: 100   External rotation: 20   Internal rotation: 0     Muscle Strength   Abduction: 4/5   Adduction: 4/5   Flexion: 4/5     Other   Erythema: absent  Sensation: normal  Pulse: present    Comments:   Calf is soft and nontender          Diagnostics, reviewed and taken today if performed as documented:    None performed      The attending physician has personally reviewed the pertinent films in PACS and interpretation is as follows:   X-rays of the left hip were reviewed from 03/03/2021: moderate to severe left hip osteoarthritis  Procedures, if performed today:      None performed      Portions of the record may have been created with voice recognition software  Occasional wrong word or "sound a like" substitutions may have occurred due to the inherent limitations of voice recognition software  Read the chart carefully and recognize, using context, where substitutions have occurred

## 2021-08-10 NOTE — PATIENT INSTRUCTIONS
The benefits and purpose of the operation and or procedure have been explained to me in language I understand by Dr Lucita Cook as well the risks, alternatives and complications pertaining to the above procedure/surgery which include but is not limited to : infections, deep vein thrombosis, blood clots to the lungs, wound healing problems, complications, for extensive blood loss, including shock, possible death, leg length discrepancy, limp hip dislocations, fracture, loss of limb, persistent pain, failure of hardware/implant, damage of blood vessels and or nerves, heart attack, stroke and potential need for further surgery/revision surgery

## 2021-08-11 ENCOUNTER — HOSPITAL ENCOUNTER (OUTPATIENT)
Dept: RADIOLOGY | Age: 61
Discharge: HOME | End: 2021-08-11
Payer: COMMERCIAL

## 2021-08-11 VITALS — WEIGHT: 179.8 LBS | BODY MASS INDEX: 31.86 KG/M2 | HEIGHT: 63 IN

## 2021-08-11 DIAGNOSIS — Z12.31 SCREENING MAMMOGRAM, ENCOUNTER FOR: ICD-10-CM

## 2021-08-11 PROCEDURE — 77063 BREAST TOMOSYNTHESIS BI: CPT

## 2021-08-11 PROCEDURE — 77067 SCR MAMMO BI INCL CAD: CPT

## 2021-08-13 DIAGNOSIS — F10.10 ALCOHOL ABUSE: ICD-10-CM

## 2021-08-13 RX ORDER — LORAZEPAM 0.5 MG/1
TABLET ORAL
Qty: 20 TABLET | Refills: 0 | Status: SHIPPED | OUTPATIENT
Start: 2021-08-13 | End: 2021-09-01

## 2021-08-13 NOTE — TELEPHONE ENCOUNTER
Medication:  PDMP   07/23/2021  1  07/23/2021  LORAZEPAM 0 5 MG TABLET  20 0  20   TI [facility]     Active agreement on file -No

## 2021-09-01 DIAGNOSIS — F10.10 ALCOHOL ABUSE: ICD-10-CM

## 2021-09-01 RX ORDER — LORAZEPAM 0.5 MG/1
TABLET ORAL
Qty: 20 TABLET | Refills: 0 | Status: SHIPPED | OUTPATIENT
Start: 2021-09-01 | End: 2021-09-20

## 2021-09-01 NOTE — TELEPHONE ENCOUNTER
Medication:  PDMP  08/13/2021  1  08/13/2021  LORAZEPAM 0 5 MG TABLET  20 0  20      Active agreement on file -Yes

## 2021-09-06 DIAGNOSIS — G25.81 RESTLESS LEG SYNDROME: ICD-10-CM

## 2021-09-07 RX ORDER — PRAMIPEXOLE DIHYDROCHLORIDE 0.25 MG/1
TABLET ORAL
Qty: 90 TABLET | Refills: 0 | Status: SHIPPED | OUTPATIENT
Start: 2021-09-07 | End: 2021-11-05

## 2021-09-19 DIAGNOSIS — F10.10 ALCOHOL ABUSE: ICD-10-CM

## 2021-09-20 RX ORDER — LORAZEPAM 0.5 MG/1
TABLET ORAL
Qty: 20 TABLET | Refills: 0 | Status: SHIPPED | OUTPATIENT
Start: 2021-09-20 | End: 2021-10-12

## 2021-09-20 NOTE — TELEPHONE ENCOUNTER
Medication:  PDMP   09/01/2021  1  09/01/2021  LORAZEPAM 0 5 MG TABLET  20 0  20   TI [facility]     Active agreement on file -Yes

## 2021-10-04 ENCOUNTER — OFFICE VISIT (OUTPATIENT)
Dept: DERMATOLOGY | Facility: CLINIC | Age: 61
End: 2021-10-04
Payer: COMMERCIAL

## 2021-10-04 VITALS — BODY MASS INDEX: 31.01 KG/M2 | TEMPERATURE: 98.3 F | HEIGHT: 63 IN | WEIGHT: 175 LBS

## 2021-10-04 DIAGNOSIS — L98.9 LESION OF SKIN OF NOSE: Primary | ICD-10-CM

## 2021-10-04 PROCEDURE — 99204 OFFICE O/P NEW MOD 45 MIN: CPT | Performed by: DERMATOLOGY

## 2021-10-04 RX ORDER — IMIQUIMOD 12.5 MG/.25G
CREAM TOPICAL
Qty: 30 EACH | Refills: 0 | Status: SHIPPED | OUTPATIENT
Start: 2021-10-04 | End: 2021-12-08

## 2021-10-08 ENCOUNTER — TELEPHONE (OUTPATIENT)
Dept: OBGYN CLINIC | Facility: HOSPITAL | Age: 61
End: 2021-10-08

## 2021-10-09 ENCOUNTER — IMMUNIZATIONS (OUTPATIENT)
Dept: FAMILY MEDICINE CLINIC | Facility: HOSPITAL | Age: 61
End: 2021-10-09

## 2021-10-09 DIAGNOSIS — Z23 ENCOUNTER FOR IMMUNIZATION: Primary | ICD-10-CM

## 2021-10-09 PROCEDURE — 0001A SARS-COV-2 / COVID-19 MRNA VACCINE (PFIZER-BIONTECH) 30 MCG: CPT

## 2021-10-09 PROCEDURE — 91300 SARS-COV-2 / COVID-19 MRNA VACCINE (PFIZER-BIONTECH) 30 MCG: CPT

## 2021-10-11 DIAGNOSIS — F10.10 ALCOHOL ABUSE: ICD-10-CM

## 2021-10-12 RX ORDER — LORAZEPAM 0.5 MG/1
TABLET ORAL
Qty: 20 TABLET | Refills: 0 | Status: SHIPPED | OUTPATIENT
Start: 2021-10-12 | End: 2022-05-12

## 2021-10-18 ENCOUNTER — TELEPHONE (OUTPATIENT)
Dept: FAMILY MEDICINE CLINIC | Facility: CLINIC | Age: 61
End: 2021-10-18

## 2021-10-19 ENCOUNTER — TELEPHONE (OUTPATIENT)
Dept: FAMILY MEDICINE CLINIC | Facility: CLINIC | Age: 61
End: 2021-10-19

## 2021-10-19 ENCOUNTER — OFFICE VISIT (OUTPATIENT)
Dept: FAMILY MEDICINE CLINIC | Facility: CLINIC | Age: 61
End: 2021-10-19
Payer: COMMERCIAL

## 2021-10-19 ENCOUNTER — HOSPITAL ENCOUNTER (OUTPATIENT)
Dept: VASCULAR ULTRASOUND | Facility: HOSPITAL | Age: 61
Discharge: HOME | End: 2021-10-19
Payer: COMMERCIAL

## 2021-10-19 VITALS
HEART RATE: 77 BPM | DIASTOLIC BLOOD PRESSURE: 78 MMHG | TEMPERATURE: 98.7 F | RESPIRATION RATE: 16 BRPM | OXYGEN SATURATION: 98 % | HEIGHT: 63 IN | WEIGHT: 181 LBS | SYSTOLIC BLOOD PRESSURE: 140 MMHG | BODY MASS INDEX: 32.07 KG/M2

## 2021-10-19 DIAGNOSIS — E78.2 MIXED HYPERLIPIDEMIA: ICD-10-CM

## 2021-10-19 DIAGNOSIS — F41.9 ANXIETY: ICD-10-CM

## 2021-10-19 DIAGNOSIS — S40.022A SUPERFICIAL BRUISING OF ARM, LEFT, INITIAL ENCOUNTER: Primary | ICD-10-CM

## 2021-10-19 DIAGNOSIS — S40.022A SUPERFICIAL BRUISING OF ARM, LEFT, INITIAL ENCOUNTER: ICD-10-CM

## 2021-10-19 PROCEDURE — 93971 EXTREMITY STUDY: CPT

## 2021-10-19 PROCEDURE — 99214 OFFICE O/P EST MOD 30 MIN: CPT | Performed by: FAMILY MEDICINE

## 2021-10-20 PROCEDURE — 93971 EXTREMITY STUDY: CPT | Performed by: SURGERY

## 2021-11-03 ENCOUNTER — OFFICE VISIT (OUTPATIENT)
Dept: FAMILY MEDICINE CLINIC | Facility: CLINIC | Age: 61
End: 2021-11-03
Payer: COMMERCIAL

## 2021-11-03 VITALS
DIASTOLIC BLOOD PRESSURE: 70 MMHG | HEIGHT: 63 IN | TEMPERATURE: 98.4 F | HEART RATE: 69 BPM | RESPIRATION RATE: 16 BRPM | WEIGHT: 177.2 LBS | BODY MASS INDEX: 31.4 KG/M2 | SYSTOLIC BLOOD PRESSURE: 132 MMHG | OXYGEN SATURATION: 98 %

## 2021-11-03 DIAGNOSIS — F33.0 MILD EPISODE OF RECURRENT MAJOR DEPRESSIVE DISORDER (HCC): ICD-10-CM

## 2021-11-03 DIAGNOSIS — F41.9 ANXIETY: ICD-10-CM

## 2021-11-03 DIAGNOSIS — R10.30 LOWER ABDOMINAL PAIN: Primary | ICD-10-CM

## 2021-11-03 PROCEDURE — 99214 OFFICE O/P EST MOD 30 MIN: CPT | Performed by: FAMILY MEDICINE

## 2021-11-05 DIAGNOSIS — G25.81 RESTLESS LEG SYNDROME: ICD-10-CM

## 2021-11-05 RX ORDER — PRAMIPEXOLE DIHYDROCHLORIDE 0.25 MG/1
TABLET ORAL
Qty: 90 TABLET | Refills: 0 | Status: SHIPPED | OUTPATIENT
Start: 2021-11-05 | End: 2022-01-04 | Stop reason: SDUPTHER

## 2021-11-06 ENCOUNTER — HOSPITAL ENCOUNTER (OUTPATIENT)
Dept: RADIOLOGY | Facility: HOSPITAL | Age: 61
Discharge: HOME | End: 2021-11-06
Payer: COMMERCIAL

## 2021-11-06 DIAGNOSIS — R10.30 LOWER ABDOMINAL PAIN: ICD-10-CM

## 2021-11-06 PROCEDURE — 76830 TRANSVAGINAL US NON-OB: CPT

## 2021-11-06 PROCEDURE — 76770 US EXAM ABDO BACK WALL COMP: CPT

## 2021-11-06 PROCEDURE — 76856 US EXAM PELVIC COMPLETE: CPT

## 2021-11-17 ENCOUNTER — OFFICE VISIT (OUTPATIENT)
Dept: FAMILY MEDICINE CLINIC | Facility: CLINIC | Age: 61
End: 2021-11-17
Payer: COMMERCIAL

## 2021-11-17 VITALS
RESPIRATION RATE: 16 BRPM | TEMPERATURE: 98 F | HEIGHT: 63 IN | DIASTOLIC BLOOD PRESSURE: 86 MMHG | OXYGEN SATURATION: 97 % | SYSTOLIC BLOOD PRESSURE: 138 MMHG | BODY MASS INDEX: 31.93 KG/M2 | WEIGHT: 180.2 LBS | HEART RATE: 75 BPM

## 2021-11-17 DIAGNOSIS — Z01.818 PRE-OP EVALUATION: Primary | ICD-10-CM

## 2021-11-17 DIAGNOSIS — M24.152 DEGENERATIVE TEAR OF ACETABULAR LABRUM OF LEFT HIP: ICD-10-CM

## 2021-11-17 DIAGNOSIS — M16.12 PRIMARY LOCALIZED OSTEOARTHRITIS OF LEFT HIP: ICD-10-CM

## 2021-11-17 PROCEDURE — 99214 OFFICE O/P EST MOD 30 MIN: CPT | Performed by: FAMILY MEDICINE

## 2021-11-21 ENCOUNTER — APPOINTMENT (OUTPATIENT)
Dept: LAB | Facility: CLINIC | Age: 61
End: 2021-11-21
Payer: COMMERCIAL

## 2021-11-21 ENCOUNTER — OFFICE VISIT (OUTPATIENT)
Dept: LAB | Facility: CLINIC | Age: 61
End: 2021-11-21
Payer: COMMERCIAL

## 2021-11-21 DIAGNOSIS — Z01.818 PREOP TESTING: ICD-10-CM

## 2021-11-21 DIAGNOSIS — M16.12 ARTHRITIS OF LEFT HIP: ICD-10-CM

## 2021-11-21 DIAGNOSIS — Z96.642 STATUS POST TOTAL REPLACEMENT OF LEFT HIP: ICD-10-CM

## 2021-11-21 LAB
ABO GROUP BLD: NORMAL
ALBUMIN SERPL BCP-MCNC: 3.9 G/DL (ref 3.5–5)
ALP SERPL-CCNC: 41 U/L (ref 46–116)
ALT SERPL W P-5'-P-CCNC: 23 U/L (ref 12–78)
ANION GAP SERPL CALCULATED.3IONS-SCNC: 9 MMOL/L (ref 4–13)
APTT PPP: 27 SECONDS (ref 23–37)
AST SERPL W P-5'-P-CCNC: 20 U/L (ref 5–45)
BASOPHILS # BLD AUTO: 0.03 THOUSANDS/ΜL (ref 0–0.1)
BASOPHILS NFR BLD AUTO: 1 % (ref 0–1)
BILIRUB SERPL-MCNC: 0.96 MG/DL (ref 0.2–1)
BLD GP AB SCN SERPL QL: NEGATIVE
BUN SERPL-MCNC: 18 MG/DL (ref 5–25)
CALCIUM SERPL-MCNC: 9 MG/DL (ref 8.3–10.1)
CHLORIDE SERPL-SCNC: 101 MMOL/L (ref 100–108)
CO2 SERPL-SCNC: 28 MMOL/L (ref 21–32)
CREAT SERPL-MCNC: 0.7 MG/DL (ref 0.6–1.3)
EOSINOPHIL # BLD AUTO: 0.14 THOUSAND/ΜL (ref 0–0.61)
EOSINOPHIL NFR BLD AUTO: 3 % (ref 0–6)
ERYTHROCYTE [DISTWIDTH] IN BLOOD BY AUTOMATED COUNT: 12.7 % (ref 11.6–15.1)
EST. AVERAGE GLUCOSE BLD GHB EST-MCNC: 108 MG/DL
FERRITIN SERPL-MCNC: 72 NG/ML (ref 8–388)
GFR SERPL CREATININE-BSD FRML MDRD: 94 ML/MIN/1.73SQ M
GLUCOSE P FAST SERPL-MCNC: 102 MG/DL (ref 65–99)
HBA1C MFR BLD: 5.4 %
HCT VFR BLD AUTO: 38.7 % (ref 34.8–46.1)
HGB BLD-MCNC: 12.7 G/DL (ref 11.5–15.4)
IMM GRANULOCYTES # BLD AUTO: 0.02 THOUSAND/UL (ref 0–0.2)
IMM GRANULOCYTES NFR BLD AUTO: 0 % (ref 0–2)
INR PPP: 0.91 (ref 0.84–1.19)
IRON SATN MFR SERPL: 24 % (ref 15–50)
IRON SERPL-MCNC: 86 UG/DL (ref 50–170)
LYMPHOCYTES # BLD AUTO: 1.79 THOUSANDS/ΜL (ref 0.6–4.47)
LYMPHOCYTES NFR BLD AUTO: 33 % (ref 14–44)
MCH RBC QN AUTO: 30.9 PG (ref 26.8–34.3)
MCHC RBC AUTO-ENTMCNC: 32.8 G/DL (ref 31.4–37.4)
MCV RBC AUTO: 94 FL (ref 82–98)
MONOCYTES # BLD AUTO: 0.64 THOUSAND/ΜL (ref 0.17–1.22)
MONOCYTES NFR BLD AUTO: 12 % (ref 4–12)
NEUTROPHILS # BLD AUTO: 2.75 THOUSANDS/ΜL (ref 1.85–7.62)
NEUTS SEG NFR BLD AUTO: 51 % (ref 43–75)
NRBC BLD AUTO-RTO: 0 /100 WBCS
PLATELET # BLD AUTO: 228 THOUSANDS/UL (ref 149–390)
PMV BLD AUTO: 9.1 FL (ref 8.9–12.7)
POTASSIUM SERPL-SCNC: 4.5 MMOL/L (ref 3.5–5.3)
PROT SERPL-MCNC: 7.3 G/DL (ref 6.4–8.2)
PROTHROMBIN TIME: 12.3 SECONDS (ref 11.6–14.5)
RBC # BLD AUTO: 4.11 MILLION/UL (ref 3.81–5.12)
RETICS # AUTO: NORMAL 10*3/UL (ref 14097–95744)
RETICS # CALC: 1.81 % (ref 0.37–1.87)
RH BLD: POSITIVE
SODIUM SERPL-SCNC: 138 MMOL/L (ref 136–145)
SPECIMEN EXPIRATION DATE: NORMAL
TIBC SERPL-MCNC: 361 UG/DL (ref 250–450)
WBC # BLD AUTO: 5.37 THOUSAND/UL (ref 4.31–10.16)

## 2021-11-21 PROCEDURE — 85610 PROTHROMBIN TIME: CPT

## 2021-11-21 PROCEDURE — 86850 RBC ANTIBODY SCREEN: CPT

## 2021-11-21 PROCEDURE — 82728 ASSAY OF FERRITIN: CPT

## 2021-11-21 PROCEDURE — 86900 BLOOD TYPING SEROLOGIC ABO: CPT

## 2021-11-21 PROCEDURE — 80053 COMPREHEN METABOLIC PANEL: CPT

## 2021-11-21 PROCEDURE — 36415 COLL VENOUS BLD VENIPUNCTURE: CPT

## 2021-11-21 PROCEDURE — 85730 THROMBOPLASTIN TIME PARTIAL: CPT

## 2021-11-21 PROCEDURE — 86901 BLOOD TYPING SEROLOGIC RH(D): CPT

## 2021-11-21 PROCEDURE — 85045 AUTOMATED RETICULOCYTE COUNT: CPT

## 2021-11-21 PROCEDURE — 83540 ASSAY OF IRON: CPT

## 2021-11-21 PROCEDURE — 83550 IRON BINDING TEST: CPT

## 2021-11-21 PROCEDURE — 93005 ELECTROCARDIOGRAM TRACING: CPT

## 2021-11-21 PROCEDURE — 83036 HEMOGLOBIN GLYCOSYLATED A1C: CPT

## 2021-11-21 PROCEDURE — 85025 COMPLETE CBC W/AUTO DIFF WBC: CPT

## 2021-11-22 ENCOUNTER — PATIENT OUTREACH (OUTPATIENT)
Dept: OBGYN CLINIC | Facility: HOSPITAL | Age: 61
End: 2021-11-22

## 2021-11-22 LAB
ATRIAL RATE: 63 BPM
P AXIS: 53 DEGREES
PR INTERVAL: 180 MS
QRS AXIS: 87 DEGREES
QRSD INTERVAL: 96 MS
QT INTERVAL: 436 MS
QTC INTERVAL: 446 MS
T WAVE AXIS: 53 DEGREES
VENTRICULAR RATE: 63 BPM

## 2021-11-22 PROCEDURE — 93010 ELECTROCARDIOGRAM REPORT: CPT | Performed by: INTERNAL MEDICINE

## 2021-11-24 ENCOUNTER — TELEPHONE (OUTPATIENT)
Dept: OBGYN CLINIC | Facility: HOSPITAL | Age: 61
End: 2021-11-24

## 2021-11-29 ENCOUNTER — OFFICE VISIT (OUTPATIENT)
Dept: DERMATOLOGY | Facility: CLINIC | Age: 61
End: 2021-11-29
Payer: COMMERCIAL

## 2021-11-29 VITALS — BODY MASS INDEX: 31.89 KG/M2 | WEIGHT: 180 LBS | TEMPERATURE: 97.8 F | HEIGHT: 63 IN

## 2021-11-29 DIAGNOSIS — L98.9 LESION OF SKIN OF NOSE: Primary | ICD-10-CM

## 2021-11-29 PROCEDURE — 99213 OFFICE O/P EST LOW 20 MIN: CPT | Performed by: DERMATOLOGY

## 2021-12-02 ENCOUNTER — TELEPHONE (OUTPATIENT)
Dept: OBGYN CLINIC | Facility: HOSPITAL | Age: 61
End: 2021-12-02

## 2021-12-06 ENCOUNTER — TELEPHONE (OUTPATIENT)
Dept: OBGYN CLINIC | Facility: HOSPITAL | Age: 61
End: 2021-12-06

## 2021-12-09 ENCOUNTER — EVALUATION (OUTPATIENT)
Dept: PHYSICAL THERAPY | Facility: CLINIC | Age: 61
End: 2021-12-09
Payer: COMMERCIAL

## 2021-12-09 VITALS — DIASTOLIC BLOOD PRESSURE: 82 MMHG | SYSTOLIC BLOOD PRESSURE: 148 MMHG

## 2021-12-09 DIAGNOSIS — Z01.818 PREOP TESTING: ICD-10-CM

## 2021-12-09 DIAGNOSIS — M16.12 ARTHRITIS OF LEFT HIP: Primary | ICD-10-CM

## 2021-12-09 PROCEDURE — 97161 PT EVAL LOW COMPLEX 20 MIN: CPT | Performed by: PHYSICAL THERAPIST

## 2021-12-09 PROCEDURE — 97110 THERAPEUTIC EXERCISES: CPT | Performed by: PHYSICAL THERAPIST

## 2021-12-12 ENCOUNTER — ANESTHESIA EVENT (OUTPATIENT)
Dept: PERIOP | Facility: HOSPITAL | Age: 61
End: 2021-12-12
Payer: COMMERCIAL

## 2021-12-13 ENCOUNTER — ANESTHESIA (OUTPATIENT)
Dept: PERIOP | Facility: HOSPITAL | Age: 61
End: 2021-12-13
Payer: COMMERCIAL

## 2021-12-13 ENCOUNTER — HOSPITAL ENCOUNTER (OUTPATIENT)
Facility: HOSPITAL | Age: 61
Setting detail: OUTPATIENT SURGERY
Discharge: HOME | End: 2021-12-14
Attending: ORTHOPAEDIC SURGERY | Admitting: ORTHOPAEDIC SURGERY
Payer: COMMERCIAL

## 2021-12-13 ENCOUNTER — APPOINTMENT (OUTPATIENT)
Dept: RADIOLOGY | Facility: HOSPITAL | Age: 61
End: 2021-12-13
Payer: COMMERCIAL

## 2021-12-13 DIAGNOSIS — Z96.642 STATUS POST TOTAL HIP REPLACEMENT, LEFT: Primary | ICD-10-CM

## 2021-12-13 PROBLEM — R11.2 PONV (POSTOPERATIVE NAUSEA AND VOMITING): Chronic | Status: ACTIVE | Noted: 2021-12-13

## 2021-12-13 PROBLEM — Z98.890 PONV (POSTOPERATIVE NAUSEA AND VOMITING): Chronic | Status: ACTIVE | Noted: 2021-12-13

## 2021-12-13 LAB — GLUCOSE SERPL-MCNC: 101 MG/DL (ref 65–140)

## 2021-12-13 PROCEDURE — C1776 JOINT DEVICE (IMPLANTABLE): HCPCS | Performed by: ORTHOPAEDIC SURGERY

## 2021-12-13 PROCEDURE — 97116 GAIT TRAINING THERAPY: CPT

## 2021-12-13 PROCEDURE — 82948 REAGENT STRIP/BLOOD GLUCOSE: CPT

## 2021-12-13 PROCEDURE — 73502 X-RAY EXAM HIP UNI 2-3 VIEWS: CPT

## 2021-12-13 PROCEDURE — 27130 TOTAL HIP ARTHROPLASTY: CPT | Performed by: ORTHOPAEDIC SURGERY

## 2021-12-13 PROCEDURE — 97163 PT EVAL HIGH COMPLEX 45 MIN: CPT

## 2021-12-13 PROCEDURE — 97110 THERAPEUTIC EXERCISES: CPT

## 2021-12-13 PROCEDURE — 27130 TOTAL HIP ARTHROPLASTY: CPT | Performed by: PHYSICIAN ASSISTANT

## 2021-12-13 DEVICE — PINNACLE HIP SOLUTIONS ALTRX POLYETHYLENE ACETABULAR LINER NEUTRAL 36MM ID 52MM OD
Type: IMPLANTABLE DEVICE | Site: HIP | Status: FUNCTIONAL
Brand: PINNACLE ALTRX

## 2021-12-13 DEVICE — PINNACLE GRIPTION ACETABULAR SHELL SECTOR 52MM OD
Type: IMPLANTABLE DEVICE | Site: HIP | Status: FUNCTIONAL
Brand: PINNACLE GRIPTION

## 2021-12-13 DEVICE — ACTIS DUOFIX HIP PROSTHESIS (FEMORAL STEM 12/14 TAPER CEMENTLESS SIZE 3 STD COLLAR)  CE
Type: IMPLANTABLE DEVICE | Site: HIP | Status: FUNCTIONAL
Brand: ACTIS

## 2021-12-13 DEVICE — BIOLOX DELTA CERAMIC FEMORAL HEAD +5.0 36MM DIA 12/14 TAPER
Type: IMPLANTABLE DEVICE | Site: HIP | Status: FUNCTIONAL
Brand: BIOLOX DELTA

## 2021-12-13 RX ORDER — DOCUSATE SODIUM 100 MG/1
100 CAPSULE, LIQUID FILLED ORAL 2 TIMES DAILY
Status: DISCONTINUED | OUTPATIENT
Start: 2021-12-13 | End: 2021-12-14 | Stop reason: HOSPADM

## 2021-12-13 RX ORDER — SENNOSIDES 8.6 MG
1 TABLET ORAL DAILY
Status: DISCONTINUED | OUTPATIENT
Start: 2021-12-13 | End: 2021-12-14 | Stop reason: HOSPADM

## 2021-12-13 RX ORDER — GABAPENTIN 100 MG/1
100 CAPSULE ORAL EVERY 8 HOURS
Status: DISCONTINUED | OUTPATIENT
Start: 2021-12-13 | End: 2021-12-14 | Stop reason: HOSPADM

## 2021-12-13 RX ORDER — EPHEDRINE SULFATE 50 MG/ML
INJECTION INTRAVENOUS AS NEEDED
Status: DISCONTINUED | OUTPATIENT
Start: 2021-12-13 | End: 2021-12-13

## 2021-12-13 RX ORDER — ASPIRIN 81 MG/1
81 TABLET ORAL 2 TIMES DAILY
Qty: 70 TABLET | Refills: 0 | Status: SHIPPED | OUTPATIENT
Start: 2021-12-13 | End: 2022-05-12 | Stop reason: ALTCHOICE

## 2021-12-13 RX ORDER — FENTANYL CITRATE 50 UG/ML
INJECTION, SOLUTION INTRAMUSCULAR; INTRAVENOUS AS NEEDED
Status: DISCONTINUED | OUTPATIENT
Start: 2021-12-13 | End: 2021-12-13

## 2021-12-13 RX ORDER — MAGNESIUM HYDROXIDE 1200 MG/15ML
LIQUID ORAL AS NEEDED
Status: DISCONTINUED | OUTPATIENT
Start: 2021-12-13 | End: 2021-12-13 | Stop reason: HOSPADM

## 2021-12-13 RX ORDER — SENNOSIDES 8.6 MG
650 CAPSULE ORAL EVERY 8 HOURS PRN
Qty: 30 TABLET | Refills: 0 | Status: SHIPPED | OUTPATIENT
Start: 2021-12-13 | End: 2022-01-12

## 2021-12-13 RX ORDER — FENTANYL CITRATE/PF 50 MCG/ML
50 SYRINGE (ML) INJECTION
Status: DISCONTINUED | OUTPATIENT
Start: 2021-12-13 | End: 2021-12-13 | Stop reason: HOSPADM

## 2021-12-13 RX ORDER — PRAMIPEXOLE DIHYDROCHLORIDE 0.25 MG/1
0.25 TABLET ORAL DAILY
Status: DISCONTINUED | OUTPATIENT
Start: 2021-12-13 | End: 2021-12-14 | Stop reason: HOSPADM

## 2021-12-13 RX ORDER — DEXAMETHASONE SODIUM PHOSPHATE 10 MG/ML
INJECTION, SOLUTION INTRAMUSCULAR; INTRAVENOUS AS NEEDED
Status: DISCONTINUED | OUTPATIENT
Start: 2021-12-13 | End: 2021-12-13

## 2021-12-13 RX ORDER — LIDOCAINE HYDROCHLORIDE AND EPINEPHRINE 10; 10 MG/ML; UG/ML
INJECTION, SOLUTION INFILTRATION; PERINEURAL AS NEEDED
Status: DISCONTINUED | OUTPATIENT
Start: 2021-12-13 | End: 2021-12-13 | Stop reason: HOSPADM

## 2021-12-13 RX ORDER — LORAZEPAM 0.5 MG/1
0.5 TABLET ORAL 2 TIMES DAILY PRN
Status: DISCONTINUED | OUTPATIENT
Start: 2021-12-13 | End: 2021-12-14 | Stop reason: HOSPADM

## 2021-12-13 RX ORDER — LIDOCAINE HYDROCHLORIDE 10 MG/ML
INJECTION, SOLUTION EPIDURAL; INFILTRATION; INTRACAUDAL; PERINEURAL AS NEEDED
Status: DISCONTINUED | OUTPATIENT
Start: 2021-12-13 | End: 2021-12-13

## 2021-12-13 RX ORDER — MAGNESIUM HYDROXIDE/ALUMINUM HYDROXICE/SIMETHICONE 120; 1200; 1200 MG/30ML; MG/30ML; MG/30ML
30 SUSPENSION ORAL EVERY 6 HOURS PRN
Status: DISCONTINUED | OUTPATIENT
Start: 2021-12-13 | End: 2021-12-14 | Stop reason: HOSPADM

## 2021-12-13 RX ORDER — METHOCARBAMOL 500 MG/1
500 TABLET, FILM COATED ORAL EVERY 6 HOURS SCHEDULED
Status: DISCONTINUED | OUTPATIENT
Start: 2021-12-13 | End: 2021-12-14 | Stop reason: HOSPADM

## 2021-12-13 RX ORDER — SODIUM CHLORIDE, SODIUM LACTATE, POTASSIUM CHLORIDE, CALCIUM CHLORIDE 600; 310; 30; 20 MG/100ML; MG/100ML; MG/100ML; MG/100ML
INJECTION, SOLUTION INTRAVENOUS CONTINUOUS PRN
Status: DISCONTINUED | OUTPATIENT
Start: 2021-12-13 | End: 2021-12-13

## 2021-12-13 RX ORDER — ACETAMINOPHEN 325 MG/1
975 TABLET ORAL ONCE
Status: COMPLETED | OUTPATIENT
Start: 2021-12-13 | End: 2021-12-13

## 2021-12-13 RX ORDER — CHLORHEXIDINE GLUCONATE 4 G/100ML
SOLUTION TOPICAL DAILY PRN
Status: DISCONTINUED | OUTPATIENT
Start: 2021-12-13 | End: 2021-12-13

## 2021-12-13 RX ORDER — OXYCODONE HYDROCHLORIDE 5 MG/1
5 TABLET ORAL EVERY 4 HOURS PRN
Status: DISCONTINUED | OUTPATIENT
Start: 2021-12-13 | End: 2021-12-14 | Stop reason: HOSPADM

## 2021-12-13 RX ORDER — ACETAMINOPHEN 325 MG/1
975 TABLET ORAL EVERY 8 HOURS
Status: DISCONTINUED | OUTPATIENT
Start: 2021-12-13 | End: 2021-12-14 | Stop reason: HOSPADM

## 2021-12-13 RX ORDER — OXYCODONE HYDROCHLORIDE 5 MG/1
TABLET ORAL
Qty: 30 TABLET | Refills: 0 | Status: SHIPPED | OUTPATIENT
Start: 2021-12-13 | End: 2022-01-12

## 2021-12-13 RX ORDER — HYDROMORPHONE HCL/PF 1 MG/ML
0.5 SYRINGE (ML) INJECTION
Status: DISCONTINUED | OUTPATIENT
Start: 2021-12-13 | End: 2021-12-13 | Stop reason: HOSPADM

## 2021-12-13 RX ORDER — HYDROMORPHONE HCL/PF 1 MG/ML
0.5 SYRINGE (ML) INJECTION EVERY 2 HOUR PRN
Status: DISCONTINUED | OUTPATIENT
Start: 2021-12-13 | End: 2021-12-14 | Stop reason: HOSPADM

## 2021-12-13 RX ORDER — ONDANSETRON 2 MG/ML
INJECTION INTRAMUSCULAR; INTRAVENOUS AS NEEDED
Status: DISCONTINUED | OUTPATIENT
Start: 2021-12-13 | End: 2021-12-13

## 2021-12-13 RX ORDER — MIDAZOLAM HYDROCHLORIDE 2 MG/2ML
INJECTION, SOLUTION INTRAMUSCULAR; INTRAVENOUS AS NEEDED
Status: DISCONTINUED | OUTPATIENT
Start: 2021-12-13 | End: 2021-12-13

## 2021-12-13 RX ORDER — DEXMEDETOMIDINE HYDROCHLORIDE 100 UG/ML
INJECTION, SOLUTION INTRAVENOUS AS NEEDED
Status: DISCONTINUED | OUTPATIENT
Start: 2021-12-13 | End: 2021-12-13

## 2021-12-13 RX ORDER — CITALOPRAM 20 MG/1
40 TABLET ORAL DAILY
Status: DISCONTINUED | OUTPATIENT
Start: 2021-12-14 | End: 2021-12-14 | Stop reason: HOSPADM

## 2021-12-13 RX ORDER — GLYCOPYRROLATE 0.2 MG/ML
INJECTION INTRAMUSCULAR; INTRAVENOUS AS NEEDED
Status: DISCONTINUED | OUTPATIENT
Start: 2021-12-13 | End: 2021-12-13

## 2021-12-13 RX ORDER — PROPOFOL 10 MG/ML
INJECTION, EMULSION INTRAVENOUS AS NEEDED
Status: DISCONTINUED | OUTPATIENT
Start: 2021-12-13 | End: 2021-12-13

## 2021-12-13 RX ORDER — BUPIVACAINE HYDROCHLORIDE 7.5 MG/ML
INJECTION, SOLUTION INTRASPINAL AS NEEDED
Status: DISCONTINUED | OUTPATIENT
Start: 2021-12-13 | End: 2021-12-13

## 2021-12-13 RX ORDER — DOCUSATE SODIUM 100 MG/1
100 CAPSULE, LIQUID FILLED ORAL 2 TIMES DAILY
Qty: 10 CAPSULE | Refills: 0 | Status: SHIPPED | OUTPATIENT
Start: 2021-12-13 | End: 2022-05-12 | Stop reason: ALTCHOICE

## 2021-12-13 RX ORDER — ONDANSETRON 2 MG/ML
4 INJECTION INTRAMUSCULAR; INTRAVENOUS ONCE AS NEEDED
Status: DISCONTINUED | OUTPATIENT
Start: 2021-12-13 | End: 2021-12-13 | Stop reason: HOSPADM

## 2021-12-13 RX ORDER — SODIUM CHLORIDE 9 MG/ML
125 INJECTION, SOLUTION INTRAVENOUS CONTINUOUS
Status: DISCONTINUED | OUTPATIENT
Start: 2021-12-13 | End: 2021-12-14 | Stop reason: HOSPADM

## 2021-12-13 RX ORDER — KETAMINE HCL IN NACL, ISO-OSM 100MG/10ML
SYRINGE (ML) INJECTION AS NEEDED
Status: DISCONTINUED | OUTPATIENT
Start: 2021-12-13 | End: 2021-12-13

## 2021-12-13 RX ORDER — OXYCODONE HYDROCHLORIDE 10 MG/1
10 TABLET ORAL EVERY 4 HOURS PRN
Status: DISCONTINUED | OUTPATIENT
Start: 2021-12-13 | End: 2021-12-14 | Stop reason: HOSPADM

## 2021-12-13 RX ORDER — ONDANSETRON 2 MG/ML
4 INJECTION INTRAMUSCULAR; INTRAVENOUS EVERY 6 HOURS PRN
Status: DISCONTINUED | OUTPATIENT
Start: 2021-12-13 | End: 2021-12-14 | Stop reason: HOSPADM

## 2021-12-13 RX ORDER — PROPOFOL 10 MG/ML
INJECTION, EMULSION INTRAVENOUS CONTINUOUS PRN
Status: DISCONTINUED | OUTPATIENT
Start: 2021-12-13 | End: 2021-12-13

## 2021-12-13 RX ORDER — CEFAZOLIN SODIUM 2 G/50ML
2000 SOLUTION INTRAVENOUS ONCE
Status: COMPLETED | OUTPATIENT
Start: 2021-12-13 | End: 2021-12-13

## 2021-12-13 RX ADMIN — PROPOFOL 80 MG: 10 INJECTION, EMULSION INTRAVENOUS at 07:46

## 2021-12-13 RX ADMIN — ONDANSETRON 4 MG: 2 INJECTION INTRAMUSCULAR; INTRAVENOUS at 09:07

## 2021-12-13 RX ADMIN — EPHEDRINE SULFATE 5 MG: 50 INJECTION, SOLUTION INTRAVENOUS at 08:49

## 2021-12-13 RX ADMIN — FENTANYL CITRATE 25 MCG: 50 INJECTION, SOLUTION INTRAMUSCULAR; INTRAVENOUS at 07:46

## 2021-12-13 RX ADMIN — PROPOFOL 100 MCG/KG/MIN: 10 INJECTION, EMULSION INTRAVENOUS at 07:46

## 2021-12-13 RX ADMIN — SODIUM CHLORIDE, SODIUM LACTATE, POTASSIUM CHLORIDE, AND CALCIUM CHLORIDE: .6; .31; .03; .02 INJECTION, SOLUTION INTRAVENOUS at 08:31

## 2021-12-13 RX ADMIN — DOCUSATE SODIUM 100 MG: 100 CAPSULE ORAL at 18:37

## 2021-12-13 RX ADMIN — SODIUM CHLORIDE 125 ML/HR: 0.9 INJECTION, SOLUTION INTRAVENOUS at 16:59

## 2021-12-13 RX ADMIN — DOCUSATE SODIUM 100 MG: 100 CAPSULE ORAL at 10:54

## 2021-12-13 RX ADMIN — LIDOCAINE HYDROCHLORIDE 50 MG: 10 INJECTION, SOLUTION EPIDURAL; INFILTRATION; INTRACAUDAL at 07:46

## 2021-12-13 RX ADMIN — DEXMEDETOMIDINE HYDROCHLORIDE 8 MCG: 100 INJECTION, SOLUTION INTRAVENOUS at 08:24

## 2021-12-13 RX ADMIN — BUPIVACAINE HYDROCHLORIDE IN DEXTROSE 1.6 ML: 7.5 INJECTION, SOLUTION SUBARACHNOID at 07:41

## 2021-12-13 RX ADMIN — PRAMIPEXOLE DIHYDROCHLORIDE 0.25 MG: 0.25 TABLET ORAL at 22:03

## 2021-12-13 RX ADMIN — ENOXAPARIN SODIUM 40 MG: 40 INJECTION SUBCUTANEOUS at 22:03

## 2021-12-13 RX ADMIN — ACETAMINOPHEN 975 MG: 325 TABLET, FILM COATED ORAL at 14:30

## 2021-12-13 RX ADMIN — ACETAMINOPHEN 975 MG: 325 TABLET, FILM COATED ORAL at 07:06

## 2021-12-13 RX ADMIN — DEXAMETHASONE SODIUM PHOSPHATE 8 MG: 10 INJECTION, SOLUTION INTRAMUSCULAR; INTRAVENOUS at 07:50

## 2021-12-13 RX ADMIN — METHOCARBAMOL 500 MG: 500 TABLET ORAL at 18:37

## 2021-12-13 RX ADMIN — DEXMEDETOMIDINE HYDROCHLORIDE 8 MCG: 100 INJECTION, SOLUTION INTRAVENOUS at 08:13

## 2021-12-13 RX ADMIN — SODIUM CHLORIDE, SODIUM LACTATE, POTASSIUM CHLORIDE, AND CALCIUM CHLORIDE: .6; .31; .03; .02 INJECTION, SOLUTION INTRAVENOUS at 07:30

## 2021-12-13 RX ADMIN — DEXMEDETOMIDINE HYDROCHLORIDE 8 MCG: 100 INJECTION, SOLUTION INTRAVENOUS at 08:19

## 2021-12-13 RX ADMIN — MIDAZOLAM HYDROCHLORIDE 2 MG: 1 INJECTION, SOLUTION INTRAMUSCULAR; INTRAVENOUS at 07:30

## 2021-12-13 RX ADMIN — SODIUM CHLORIDE 125 ML/HR: 0.9 INJECTION, SOLUTION INTRAVENOUS at 10:05

## 2021-12-13 RX ADMIN — CEFAZOLIN SODIUM 2000 MG: 2 SOLUTION INTRAVENOUS at 07:30

## 2021-12-13 RX ADMIN — OXYCODONE HYDROCHLORIDE 5 MG: 5 TABLET ORAL at 19:51

## 2021-12-13 RX ADMIN — STANDARDIZED SENNA CONCENTRATE 8.6 MG: 8.6 TABLET ORAL at 10:54

## 2021-12-13 RX ADMIN — FENTANYL CITRATE 25 MCG: 50 INJECTION, SOLUTION INTRAMUSCULAR; INTRAVENOUS at 08:07

## 2021-12-13 RX ADMIN — Medication 10 MG: at 08:30

## 2021-12-13 RX ADMIN — GABAPENTIN 100 MG: 100 CAPSULE ORAL at 10:32

## 2021-12-13 RX ADMIN — EPHEDRINE SULFATE 5 MG: 50 INJECTION, SOLUTION INTRAVENOUS at 08:36

## 2021-12-13 RX ADMIN — METHOCARBAMOL 500 MG: 500 TABLET ORAL at 23:58

## 2021-12-13 RX ADMIN — FENTANYL CITRATE 50 MCG: 50 INJECTION, SOLUTION INTRAMUSCULAR; INTRAVENOUS at 09:20

## 2021-12-13 RX ADMIN — GABAPENTIN 100 MG: 100 CAPSULE ORAL at 18:37

## 2021-12-13 RX ADMIN — HYDROMORPHONE HYDROCHLORIDE 0.5 MG: 1 INJECTION, SOLUTION INTRAMUSCULAR; INTRAVENOUS; SUBCUTANEOUS at 23:58

## 2021-12-13 RX ADMIN — Medication 30 MG: at 08:01

## 2021-12-13 RX ADMIN — OXYCODONE HYDROCHLORIDE 10 MG: 10 TABLET ORAL at 15:05

## 2021-12-13 RX ADMIN — DEXMEDETOMIDINE HYDROCHLORIDE 8 MCG: 100 INJECTION, SOLUTION INTRAVENOUS at 08:35

## 2021-12-13 RX ADMIN — DEXMEDETOMIDINE HYDROCHLORIDE 8 MCG: 100 INJECTION, SOLUTION INTRAVENOUS at 08:30

## 2021-12-13 RX ADMIN — Medication 10 MG: at 08:43

## 2021-12-13 RX ADMIN — GLYCOPYRROLATE 0.2 MG: 0.2 INJECTION, SOLUTION INTRAMUSCULAR; INTRAVENOUS at 08:01

## 2021-12-13 RX ADMIN — OXYCODONE HYDROCHLORIDE 5 MG: 5 TABLET ORAL at 11:00

## 2021-12-13 RX ADMIN — EPHEDRINE SULFATE 5 MG: 50 INJECTION, SOLUTION INTRAVENOUS at 07:53

## 2021-12-13 RX ADMIN — METHOCARBAMOL 500 MG: 500 TABLET ORAL at 12:09

## 2021-12-13 RX ADMIN — GABAPENTIN 100 MG: 100 CAPSULE ORAL at 23:58

## 2021-12-13 RX ADMIN — ACETAMINOPHEN 975 MG: 325 TABLET, FILM COATED ORAL at 22:03

## 2021-12-13 RX ADMIN — TRANEXAMIC ACID 1000 MG: 1 INJECTION, SOLUTION INTRAVENOUS at 07:43

## 2021-12-14 VITALS
DIASTOLIC BLOOD PRESSURE: 58 MMHG | WEIGHT: 185.7 LBS | BODY MASS INDEX: 31.7 KG/M2 | SYSTOLIC BLOOD PRESSURE: 130 MMHG | OXYGEN SATURATION: 92 % | TEMPERATURE: 99.5 F | HEIGHT: 64 IN | HEART RATE: 106 BPM | RESPIRATION RATE: 18 BRPM

## 2021-12-14 LAB
ANION GAP SERPL CALCULATED.3IONS-SCNC: 7 MMOL/L (ref 4–13)
BASOPHILS # BLD AUTO: 0.01 THOUSANDS/ΜL (ref 0–0.1)
BASOPHILS NFR BLD AUTO: 0 % (ref 0–1)
BUN SERPL-MCNC: 13 MG/DL (ref 5–25)
CALCIUM SERPL-MCNC: 8.1 MG/DL (ref 8.3–10.1)
CHLORIDE SERPL-SCNC: 103 MMOL/L (ref 100–108)
CO2 SERPL-SCNC: 27 MMOL/L (ref 21–32)
CREAT SERPL-MCNC: 0.69 MG/DL (ref 0.6–1.3)
EOSINOPHIL # BLD AUTO: 0.01 THOUSAND/ΜL (ref 0–0.61)
EOSINOPHIL NFR BLD AUTO: 0 % (ref 0–6)
ERYTHROCYTE [DISTWIDTH] IN BLOOD BY AUTOMATED COUNT: 12.7 % (ref 11.6–15.1)
GFR SERPL CREATININE-BSD FRML MDRD: 95 ML/MIN/1.73SQ M
GLUCOSE P FAST SERPL-MCNC: 135 MG/DL (ref 65–99)
GLUCOSE SERPL-MCNC: 135 MG/DL (ref 65–140)
HCT VFR BLD AUTO: 31.2 % (ref 34.8–46.1)
HGB BLD-MCNC: 10.1 G/DL (ref 11.5–15.4)
IMM GRANULOCYTES # BLD AUTO: 0.03 THOUSAND/UL (ref 0–0.2)
IMM GRANULOCYTES NFR BLD AUTO: 0 % (ref 0–2)
LYMPHOCYTES # BLD AUTO: 1.35 THOUSANDS/ΜL (ref 0.6–4.47)
LYMPHOCYTES NFR BLD AUTO: 18 % (ref 14–44)
MCH RBC QN AUTO: 30.9 PG (ref 26.8–34.3)
MCHC RBC AUTO-ENTMCNC: 32.4 G/DL (ref 31.4–37.4)
MCV RBC AUTO: 95 FL (ref 82–98)
MONOCYTES # BLD AUTO: 1.14 THOUSAND/ΜL (ref 0.17–1.22)
MONOCYTES NFR BLD AUTO: 15 % (ref 4–12)
NEUTROPHILS # BLD AUTO: 5.08 THOUSANDS/ΜL (ref 1.85–7.62)
NEUTS SEG NFR BLD AUTO: 67 % (ref 43–75)
NRBC BLD AUTO-RTO: 0 /100 WBCS
PLATELET # BLD AUTO: 182 THOUSANDS/UL (ref 149–390)
PMV BLD AUTO: 9.3 FL (ref 8.9–12.7)
POTASSIUM SERPL-SCNC: 3.7 MMOL/L (ref 3.5–5.3)
RBC # BLD AUTO: 3.27 MILLION/UL (ref 3.81–5.12)
SODIUM SERPL-SCNC: 137 MMOL/L (ref 136–145)
WBC # BLD AUTO: 7.62 THOUSAND/UL (ref 4.31–10.16)

## 2021-12-14 PROCEDURE — 97530 THERAPEUTIC ACTIVITIES: CPT

## 2021-12-14 PROCEDURE — 80048 BASIC METABOLIC PNL TOTAL CA: CPT | Performed by: ORTHOPAEDIC SURGERY

## 2021-12-14 PROCEDURE — 85025 COMPLETE CBC W/AUTO DIFF WBC: CPT | Performed by: ORTHOPAEDIC SURGERY

## 2021-12-14 PROCEDURE — 97166 OT EVAL MOD COMPLEX 45 MIN: CPT

## 2021-12-14 PROCEDURE — 97116 GAIT TRAINING THERAPY: CPT

## 2021-12-14 PROCEDURE — 97110 THERAPEUTIC EXERCISES: CPT

## 2021-12-14 PROCEDURE — 99024 POSTOP FOLLOW-UP VISIT: CPT | Performed by: PHYSICIAN ASSISTANT

## 2021-12-14 RX ADMIN — ACETAMINOPHEN 975 MG: 325 TABLET, FILM COATED ORAL at 06:03

## 2021-12-14 RX ADMIN — DOCUSATE SODIUM 100 MG: 100 CAPSULE ORAL at 08:48

## 2021-12-14 RX ADMIN — ACETAMINOPHEN 975 MG: 325 TABLET, FILM COATED ORAL at 14:12

## 2021-12-14 RX ADMIN — CITALOPRAM HYDROBROMIDE 40 MG: 20 TABLET ORAL at 08:48

## 2021-12-14 RX ADMIN — DOCUSATE SODIUM 100 MG: 100 CAPSULE ORAL at 17:13

## 2021-12-14 RX ADMIN — METHOCARBAMOL 500 MG: 500 TABLET ORAL at 11:39

## 2021-12-14 RX ADMIN — OXYCODONE HYDROCHLORIDE 10 MG: 10 TABLET ORAL at 12:31

## 2021-12-14 RX ADMIN — METHOCARBAMOL 500 MG: 500 TABLET ORAL at 06:03

## 2021-12-14 RX ADMIN — GABAPENTIN 100 MG: 100 CAPSULE ORAL at 17:15

## 2021-12-14 RX ADMIN — STANDARDIZED SENNA CONCENTRATE 8.6 MG: 8.6 TABLET ORAL at 08:48

## 2021-12-14 RX ADMIN — OXYCODONE HYDROCHLORIDE 10 MG: 10 TABLET ORAL at 08:48

## 2021-12-14 RX ADMIN — SODIUM CHLORIDE 125 ML/HR: 0.9 INJECTION, SOLUTION INTRAVENOUS at 02:37

## 2021-12-14 RX ADMIN — GABAPENTIN 100 MG: 100 CAPSULE ORAL at 10:45

## 2021-12-14 RX ADMIN — METHOCARBAMOL 500 MG: 500 TABLET ORAL at 17:14

## 2021-12-14 RX ADMIN — ENOXAPARIN SODIUM 40 MG: 40 INJECTION SUBCUTANEOUS at 08:49

## 2021-12-14 RX ADMIN — OXYCODONE HYDROCHLORIDE 5 MG: 5 TABLET ORAL at 17:18

## 2021-12-15 LAB
DME PARACHUTE DELIVERY DATE ACTUAL: NORMAL
DME PARACHUTE DELIVERY DATE REQUESTED: NORMAL
DME PARACHUTE ITEM DESCRIPTION: NORMAL
DME PARACHUTE ORDER STATUS: NORMAL
DME PARACHUTE SUPPLIER NAME: NORMAL
DME PARACHUTE SUPPLIER PHONE: NORMAL

## 2021-12-16 ENCOUNTER — TELEPHONE (OUTPATIENT)
Dept: OBGYN CLINIC | Facility: HOSPITAL | Age: 61
End: 2021-12-16

## 2021-12-16 ENCOUNTER — OFFICE VISIT (OUTPATIENT)
Dept: PHYSICAL THERAPY | Facility: CLINIC | Age: 61
End: 2021-12-16
Payer: COMMERCIAL

## 2021-12-16 ENCOUNTER — PATIENT OUTREACH (OUTPATIENT)
Dept: OBGYN CLINIC | Facility: HOSPITAL | Age: 61
End: 2021-12-16

## 2021-12-16 DIAGNOSIS — M16.12 ARTHRITIS OF LEFT HIP: Primary | ICD-10-CM

## 2021-12-16 DIAGNOSIS — Z96.642 STATUS POST TOTAL REPLACEMENT OF LEFT HIP: ICD-10-CM

## 2021-12-16 PROCEDURE — 97140 MANUAL THERAPY 1/> REGIONS: CPT | Performed by: PHYSICAL THERAPIST

## 2021-12-16 PROCEDURE — 97164 PT RE-EVAL EST PLAN CARE: CPT | Performed by: PHYSICAL THERAPIST

## 2021-12-16 PROCEDURE — 97110 THERAPEUTIC EXERCISES: CPT | Performed by: PHYSICAL THERAPIST

## 2021-12-17 ENCOUNTER — TELEPHONE (OUTPATIENT)
Dept: OBGYN CLINIC | Facility: HOSPITAL | Age: 61
End: 2021-12-17

## 2021-12-17 ENCOUNTER — TELEPHONE (OUTPATIENT)
Dept: OBGYN CLINIC | Facility: CLINIC | Age: 61
End: 2021-12-17

## 2021-12-20 ENCOUNTER — OFFICE VISIT (OUTPATIENT)
Dept: PHYSICAL THERAPY | Facility: CLINIC | Age: 61
End: 2021-12-20
Payer: COMMERCIAL

## 2021-12-20 DIAGNOSIS — Z96.642 STATUS POST TOTAL REPLACEMENT OF LEFT HIP: Primary | ICD-10-CM

## 2021-12-20 PROCEDURE — 97110 THERAPEUTIC EXERCISES: CPT

## 2021-12-20 PROCEDURE — 97140 MANUAL THERAPY 1/> REGIONS: CPT

## 2021-12-20 PROCEDURE — 97010 HOT OR COLD PACKS THERAPY: CPT

## 2021-12-23 ENCOUNTER — OFFICE VISIT (OUTPATIENT)
Dept: PHYSICAL THERAPY | Facility: CLINIC | Age: 61
End: 2021-12-23
Payer: COMMERCIAL

## 2021-12-23 DIAGNOSIS — Z96.642 STATUS POST TOTAL REPLACEMENT OF LEFT HIP: Primary | ICD-10-CM

## 2021-12-23 PROCEDURE — 97140 MANUAL THERAPY 1/> REGIONS: CPT | Performed by: PHYSICAL THERAPIST

## 2021-12-23 PROCEDURE — 97110 THERAPEUTIC EXERCISES: CPT | Performed by: PHYSICAL THERAPIST

## 2021-12-23 PROCEDURE — 97112 NEUROMUSCULAR REEDUCATION: CPT | Performed by: PHYSICAL THERAPIST

## 2021-12-26 DIAGNOSIS — M16.12 PRIMARY LOCALIZED OSTEOARTHRITIS OF LEFT HIP: Primary | ICD-10-CM

## 2021-12-27 ENCOUNTER — OFFICE VISIT (OUTPATIENT)
Dept: PHYSICAL THERAPY | Facility: CLINIC | Age: 61
End: 2021-12-27
Payer: COMMERCIAL

## 2021-12-27 DIAGNOSIS — Z96.642 STATUS POST TOTAL REPLACEMENT OF LEFT HIP: Primary | ICD-10-CM

## 2021-12-27 DIAGNOSIS — M16.12 ARTHRITIS OF LEFT HIP: ICD-10-CM

## 2021-12-27 PROCEDURE — 97140 MANUAL THERAPY 1/> REGIONS: CPT | Performed by: PHYSICAL THERAPIST

## 2021-12-27 PROCEDURE — 97110 THERAPEUTIC EXERCISES: CPT | Performed by: PHYSICAL THERAPIST

## 2021-12-27 PROCEDURE — 97112 NEUROMUSCULAR REEDUCATION: CPT | Performed by: PHYSICAL THERAPIST

## 2021-12-29 ENCOUNTER — OFFICE VISIT (OUTPATIENT)
Dept: OBGYN CLINIC | Facility: HOSPITAL | Age: 61
End: 2021-12-29

## 2021-12-29 ENCOUNTER — HOSPITAL ENCOUNTER (OUTPATIENT)
Dept: RADIOLOGY | Facility: HOSPITAL | Age: 61
Discharge: HOME | End: 2021-12-29
Attending: ORTHOPAEDIC SURGERY
Payer: COMMERCIAL

## 2021-12-29 ENCOUNTER — APPOINTMENT (OUTPATIENT)
Dept: LAB | Facility: CLINIC | Age: 61
End: 2021-12-29
Payer: COMMERCIAL

## 2021-12-29 ENCOUNTER — TELEPHONE (OUTPATIENT)
Dept: FAMILY MEDICINE CLINIC | Facility: CLINIC | Age: 61
End: 2021-12-29

## 2021-12-29 ENCOUNTER — TELEPHONE (OUTPATIENT)
Dept: OBGYN CLINIC | Facility: HOSPITAL | Age: 61
End: 2021-12-29

## 2021-12-29 VITALS
WEIGHT: 178 LBS | SYSTOLIC BLOOD PRESSURE: 127 MMHG | HEART RATE: 73 BPM | BODY MASS INDEX: 30.39 KG/M2 | DIASTOLIC BLOOD PRESSURE: 85 MMHG | HEIGHT: 64 IN

## 2021-12-29 DIAGNOSIS — Z47.1 AFTERCARE FOLLOWING LEFT HIP JOINT REPLACEMENT SURGERY: ICD-10-CM

## 2021-12-29 DIAGNOSIS — Z96.642 STATUS POST TOTAL REPLACEMENT OF LEFT HIP: Primary | ICD-10-CM

## 2021-12-29 DIAGNOSIS — Z96.642 AFTERCARE FOLLOWING LEFT HIP JOINT REPLACEMENT SURGERY: ICD-10-CM

## 2021-12-29 DIAGNOSIS — R42 DIZZINESS: ICD-10-CM

## 2021-12-29 DIAGNOSIS — M16.12 PRIMARY LOCALIZED OSTEOARTHRITIS OF LEFT HIP: ICD-10-CM

## 2021-12-29 LAB
FERRITIN SERPL-MCNC: 162 NG/ML (ref 8–388)
IRON SATN MFR SERPL: 11 % (ref 15–50)
IRON SERPL-MCNC: 34 UG/DL (ref 50–170)
TIBC SERPL-MCNC: 305 UG/DL (ref 250–450)

## 2021-12-29 PROCEDURE — 99024 POSTOP FOLLOW-UP VISIT: CPT | Performed by: ORTHOPAEDIC SURGERY

## 2021-12-29 PROCEDURE — 82728 ASSAY OF FERRITIN: CPT

## 2021-12-29 PROCEDURE — 73502 X-RAY EXAM HIP UNI 2-3 VIEWS: CPT

## 2021-12-29 PROCEDURE — 83540 ASSAY OF IRON: CPT

## 2021-12-29 PROCEDURE — 83550 IRON BINDING TEST: CPT

## 2021-12-29 RX ORDER — CEPHALEXIN 500 MG/1
500 CAPSULE ORAL EVERY 12 HOURS SCHEDULED
Qty: 14 CAPSULE | Refills: 0 | Status: SHIPPED | OUTPATIENT
Start: 2021-12-29 | End: 2022-01-05

## 2021-12-29 NOTE — TELEPHONE ENCOUNTER
Called pt to advise blood work ordered but Dr Jane Carver would like her to be evaluated  in ER if she is not feeling better

## 2021-12-29 NOTE — TELEPHONE ENCOUNTER
Pt called to request bloodwork for light headed and dizziness  She has total hip replacement and surgeon stated she should follow up with PCP and have bloodwork done   Denies all other symptoms  Whitley Escobar

## 2021-12-30 ENCOUNTER — OFFICE VISIT (OUTPATIENT)
Dept: PHYSICAL THERAPY | Facility: CLINIC | Age: 61
End: 2021-12-30
Payer: COMMERCIAL

## 2021-12-30 DIAGNOSIS — M16.12 ARTHRITIS OF LEFT HIP: ICD-10-CM

## 2021-12-30 DIAGNOSIS — Z96.642 STATUS POST TOTAL REPLACEMENT OF LEFT HIP: Primary | ICD-10-CM

## 2021-12-30 PROCEDURE — 97112 NEUROMUSCULAR REEDUCATION: CPT | Performed by: PHYSICAL THERAPIST

## 2021-12-30 PROCEDURE — 97110 THERAPEUTIC EXERCISES: CPT | Performed by: PHYSICAL THERAPIST

## 2022-01-03 ENCOUNTER — TELEPHONE (OUTPATIENT)
Dept: FAMILY MEDICINE CLINIC | Facility: CLINIC | Age: 62
End: 2022-01-03

## 2022-01-03 NOTE — TELEPHONE ENCOUNTER
Medication:pramipexole (MIRAPEX) Dose:   Frequency: 0 25 mg tablet   Route: Oral  Quantity: 90  Pharmacy: On File    Pt states she is currently taking 1 1/2 tablets daily and was told this script may require a pre-authorization

## 2022-01-04 ENCOUNTER — OFFICE VISIT (OUTPATIENT)
Dept: OBGYN CLINIC | Facility: CLINIC | Age: 62
End: 2022-01-04

## 2022-01-04 ENCOUNTER — OFFICE VISIT (OUTPATIENT)
Dept: PHYSICAL THERAPY | Facility: CLINIC | Age: 62
End: 2022-01-04
Payer: COMMERCIAL

## 2022-01-04 VITALS — BODY MASS INDEX: 30.39 KG/M2 | HEIGHT: 64 IN | WEIGHT: 178 LBS

## 2022-01-04 DIAGNOSIS — Z98.890 STATUS POST SURGERY: Primary | ICD-10-CM

## 2022-01-04 DIAGNOSIS — Z96.642 STATUS POST TOTAL REPLACEMENT OF LEFT HIP: Primary | ICD-10-CM

## 2022-01-04 PROCEDURE — 99024 POSTOP FOLLOW-UP VISIT: CPT | Performed by: ORTHOPAEDIC SURGERY

## 2022-01-04 PROCEDURE — 97110 THERAPEUTIC EXERCISES: CPT | Performed by: PHYSICAL THERAPIST

## 2022-01-04 PROCEDURE — 97112 NEUROMUSCULAR REEDUCATION: CPT | Performed by: PHYSICAL THERAPIST

## 2022-01-04 NOTE — PROGRESS NOTES
Daily Note     Today's date: 2022  Patient name: Gian Aviles  : 1960  MRN: [id number]  Referring provider: Squire Severs, PA-C  Dx:   Encounter Diagnosis     ICD-10-CM    1  Status post total replacement of left hip  Z96 642                   Subjective:  Fatigue persists secondary to anemia      Objective: See treatment diary below      Assessment: Gait continues to improve      Plan: Continue per plan of care  Precautions: L JOSEPH - anterior      Manuals         L hip PROM SURYA 8' 8 SURYA 8 SURYA 8' SURYA 5'        Log roll 2'                                      Neuro Re-Ed             bridge NP P!            Hip abd   MRe 2x15 MRE 2x15         SLS foam    45s x6 45s x6        Standing hip abd/ext on foam     2x10        Glut med off clare     2x15                                  Ther Ex             Recumbent bike NP 5 min  10' 10' Man lv 1 x10        Supine hip abduction 2x15 2 x 15  D/c          saq/Laq 2x15 2x 15  4# 2x15 5s D/c         Hooklying hip flexion 25 10" x 10  x10 2x10 2x10        Side stepping  2 x 10  x7 rtb x5 RTB x7        Step up  4" x 20  6" x20 8" x25         Step up lateral  4" x 20  6" x20 8" x25 2x20        Mini squats  2x 10  2x15 2x15 2x15        Ther Activity                                       Gait Training                                       Modalities

## 2022-01-04 NOTE — TELEPHONE ENCOUNTER
Spoke to patient she does not want to do the 0 5 mg because she has tried that before and she felt like she had a hangover

## 2022-01-04 NOTE — PROGRESS NOTES
64 y o female presents for 3 weeks postoperative visit status post left  Anterior JOSEPH  Patient states she is doing well she has minimal pain she is ambulate without the assistance of a cane or walker doing well physical therapy  Patient presents today for follow-up of her incision  Patient was put on antibiotics 1 week ago due to incisional erythema distal aspect  Patient denies any issues notes decreasing redness and pain with around her incision she denies any changes numbness tingling left lower extremity      Review of Systems  Review of systems negative unless otherwise specified in HPI    Past Medical History  Past Medical History:   Diagnosis Date    Anxiety     Arthritis     Cancer ([facility] [address] )     Depression     Melanoma ([facility] [address] ) 2018    Left upper arm    PONV (postoperative nausea and vomiting)        Past Surgical History  Past Surgical History:   Procedure Laterality Date     SECTION       SECTION      FL INJECTION LEFT HIP (ARTHROGRAM)  10/24/2018    FL INJECTION LEFT HIP (NON ARTHROGRAM)  2018    FL INJECTION LEFT HIP (NON ARTHROGRAM)  2019    FL INJECTION LEFT HIP (NON ARTHROGRAM)  2019    FL INJECTION LEFT HIP (NON ARTHROGRAM)  2019    FL INJECTION LEFT HIP (NON ARTHROGRAM)  2020    FL INJECTION LEFT HIP (NON ARTHROGRAM)  2020    FL INJECTION LEFT HIP (NON ARTHROGRAM)  2021    KNEE ARTHROSCOPY      SC TOTAL HIP ARTHROPLASTY Left 2021    Procedure: ARTHROPLASTY HIP TOTAL ANTERIOR;  Surgeon: Lois Orantes MD;  [facility]: [facility];  Service: Orthopedics    ROTATOR CUFF REPAIR      SHOULDER SURGERY      SKIN BIOPSY      WISDOM TOOTH EXTRACTION         Current Medications  Current Outpatient Medications on File Prior to Visit   Medication Sig Dispense Refill    acetaminophen (TYLENOL) 650 mg CR tablet Take 1 tablet (650 mg total) by mouth every 8 (eight) hours as needed for mild pain 30 tablet 0    ascorbic acid (VITAMIN C) 500 MG tablet Take 1 tablet (500 mg total) by mouth daily 60 tablet 0    aspirin (ECOTRIN LOW STRENGTH) 81 mg EC tablet Take 1 tablet (81 mg total) by mouth 2 (two) times a day Please do not start taking until after total joint arthroplasty 70 tablet 0    cephalexin (KEFLEX) 500 mg capsule Take 1 capsule (500 mg total) by mouth every 12 (twelve) hours for 7 days 14 capsule 0    citalopram (CeleXA) 40 mg tablet Take 1 tablet (40 mg total) by mouth daily 90 tablet 3    docusate sodium (COLACE) 100 mg capsule Take 1 capsule (100 mg total) by mouth 2 (two) times a day 10 capsule 0    ferrous sulfate 324 (65 Fe) mg Take 1 tablet (324 mg total) by mouth 2 (two) times a day before meals 60 tablet 0    folic acid (FOLVITE) 1 mg tablet Take 1 tablet (1 mg total) by mouth daily 30 tablet 0    LORazepam (ATIVAN) 0 5 mg tablet TAKE ONE TABLET BY MOUTH ONCE DAILY AT BEDTIME AS NEEDED FOR ANXIETY 20 tablet 0    Multiple Vitamin (MULTI-VITAMIN DAILY PO)       pramipexole (MIRAPEX) 0 25 mg tablet TAKE ONE TABLET BY MOUTH DAILY 90 tablet 0    Calcium-Magnesium-Vitamin D (CALCIUM 500) 500-250-200 MG-MG-UNIT TABS Take by mouth (Patient not taking: Reported on 1/4/2022 )      Capsaicin-Menthol (SALONPAS GEL EX) Apply topically      oxyCODONE (ROXICODONE) 5 immediate release tablet Take 1 tablets every 6 hrs as needed for pain control 30 tablet 0     No current facility-administered medications on file prior to visit         Recent Labs [facility])  0   Lab Value Date/Time    HCT 32 3 (L) 12/29/2021 1634    HGB 10 2 (L) 12/29/2021 1634    WBC 7 61 12/29/2021 1634    INR 0 91 11/21/2021 0920    HGBA1C 5 4 11/21/2021 0920         Physical exam  · General: Awake, Alert, Oriented  · Eyes: Pupils equal, round and reactive to light  · Heart: regular rate and rhythm  · Lungs: No audible wheezing  Left Lower extremity  · Examination left lower extremity well-healed well-approximated incision small amount of erythema surrounding this aspect incisions nontender palpation blanchable erythema no expressible drainage no sign infection active hip extension active knee extension active ankle dorsi plantar flexion sensation intact distal pulses present        Assessment:   Status post 3 weeks left anterior total hip arthroplasty  Plan:   Weightbearing as tolerated left lower extremity  Complete antibiotics as prescribed  Case discussed with Dr Rodriguez Blood physical therapy range of motion stretching strengthening   Aspirin for DVT prophylaxis times 30 days from date of surgery   Follow-up in 2 weeks time for repeat incision check no x-rays needed at this time

## 2022-01-06 ENCOUNTER — OFFICE VISIT (OUTPATIENT)
Dept: PHYSICAL THERAPY | Facility: CLINIC | Age: 62
End: 2022-01-06
Payer: COMMERCIAL

## 2022-01-06 DIAGNOSIS — Z96.642 STATUS POST TOTAL REPLACEMENT OF LEFT HIP: Primary | ICD-10-CM

## 2022-01-06 PROCEDURE — 97110 THERAPEUTIC EXERCISES: CPT | Performed by: PHYSICAL THERAPIST

## 2022-01-06 PROCEDURE — 97112 NEUROMUSCULAR REEDUCATION: CPT | Performed by: PHYSICAL THERAPIST

## 2022-01-06 NOTE — PROGRESS NOTES
Daily Note     Today's date: 2022  Patient name: Alka Garcia  : 1960  MRN: [id number]  Referring provider: Maris Jin PA-C  Dx: No diagnosis found  Subjective: No new complaint  Energy level improved  Objective: See treatment diary below      Assessment: Able to do more today secondary to less fatigue  Plan: Continue per plan of care  Precautions: L JOSEPH - anterior      Manuals        L hip PROM SURYA 8' 8 SURYA 8 SURYA 8' SURYA 5' SURYA 5'       Log roll 2'                                      Neuro Re-Ed             bridge NP P!            Hip abd   MRe 2x15 MRE 2x15         SLS foam    45s x6 45s x6 1' x6       Standing hip abd/ext on foam     2x10 2x15       Glut med off clare     2x15        Hip flexon on foam      3s x20       Resisted walking      30#x10       Ther Ex             Recumbent bike NP 5 min  10' 10' Man lv 1 x10 Man Lv 2 x10       Supine hip abduction 2x15 2 x 15  D/c          saq/Laq 2x15 2x 15  4# 2x15 5s D/c         Hooklying hip flexion 25 10" x 10  x10 2x10 2x10        Side stepping  2 x 10  x7 rtb x5 RTB x7 RTB x7       Step up  4" x 20  6" x20 8" x25         Step up lateral  4" x 20  6" x20 8" x25 2x20 2x20       Mini squats  2x 10  2x15 2x15 2x15 2x15       Ther Activity                                       Gait Training                                       Modalities

## 2022-01-10 ENCOUNTER — OFFICE VISIT (OUTPATIENT)
Dept: PHYSICAL THERAPY | Facility: CLINIC | Age: 62
End: 2022-01-10
Payer: COMMERCIAL

## 2022-01-10 DIAGNOSIS — M16.12 ARTHRITIS OF LEFT HIP: ICD-10-CM

## 2022-01-10 DIAGNOSIS — Z96.642 STATUS POST TOTAL REPLACEMENT OF LEFT HIP: Primary | ICD-10-CM

## 2022-01-10 PROCEDURE — 97110 THERAPEUTIC EXERCISES: CPT | Performed by: PHYSICAL THERAPIST

## 2022-01-10 PROCEDURE — 97112 NEUROMUSCULAR REEDUCATION: CPT | Performed by: PHYSICAL THERAPIST

## 2022-01-10 NOTE — PROGRESS NOTES
Daily Note     Today's date: 1/10/2022  Patient name: Sarah Swanson  : 1960  MRN: [id number]  Referring provider: Sg Strong PA-C  Dx:   Encounter Diagnosis     ICD-10-CM    1  Status post total replacement of left hip  Z96 642    2  Arthritis of left hip  M16 12                   Subjective: Energy level remains unchanged  Objective: See treatment diary below      Assessment: Continues to show progress with respect to strength as noted by exercise performance  Plan: Continue per plan of care  Precautions: L JOSEPH - anterior      Manuals 12/16 12/20 12/23 12/30 1/4 1/6 1/10      L hip PROM SURYA 8' 8 SURYA 8 SURYA 8' SURYA 5' SURYA 5' SURYA 5'      Log roll 2'                                      Neuro Re-Ed             bridge NP P!            Hip abd   MRe 2x15 MRE 2x15         SLS foam    45s x6 45s x6 1' x6 1'x6      Standing hip abd/ext on foam     2x10 2x15       Glut med off clare     2x15        Hip flexion on foam      3s x20 On step x20      Resisted walking      30#x10 50# x10      Ther Ex             Recumbent bike NP 5 min  10' 10' Man lv 1 x10 Man Lv 2 x10 Man LV 2 x10      Supine hip abduction 2x15 2 x 15  D/c          saq/Laq 2x15 2x 15  4# 2x15 5s D/c         Hooklying hip flexion 25 10" x 10  x10 2x10 2x10        Side stepping  2 x 10  x7 rtb x5 RTB x7 RTB x7 GTB x10      Step up  4" x 20  6" x20 8" x25         Step up lateral  4" x 20  6" x20 8" x25 2x20 2x20 2x20      Mini squats  2x 10  2x15 2x15 2x15 2x15 foam 2x15      Ther Activity                                       Gait Training                                       Modalities

## 2022-01-12 ENCOUNTER — APPOINTMENT (OUTPATIENT)
Dept: LAB | Facility: CLINIC | Age: 62
End: 2022-01-12
Payer: COMMERCIAL

## 2022-01-12 ENCOUNTER — OFFICE VISIT (OUTPATIENT)
Dept: FAMILY MEDICINE CLINIC | Facility: CLINIC | Age: 62
End: 2022-01-12
Payer: COMMERCIAL

## 2022-01-12 VITALS
DIASTOLIC BLOOD PRESSURE: 86 MMHG | TEMPERATURE: 98.7 F | OXYGEN SATURATION: 98 % | SYSTOLIC BLOOD PRESSURE: 130 MMHG | HEIGHT: 64 IN | BODY MASS INDEX: 30.83 KG/M2 | HEART RATE: 76 BPM | RESPIRATION RATE: 16 BRPM | WEIGHT: 180.6 LBS

## 2022-01-12 DIAGNOSIS — R42 DIZZINESS: ICD-10-CM

## 2022-01-12 DIAGNOSIS — F33.0 MILD EPISODE OF RECURRENT MAJOR DEPRESSIVE DISORDER (HCC): ICD-10-CM

## 2022-01-12 DIAGNOSIS — D50.8 OTHER IRON DEFICIENCY ANEMIA: Primary | ICD-10-CM

## 2022-01-12 DIAGNOSIS — R53.82 CHRONIC FATIGUE: ICD-10-CM

## 2022-01-12 DIAGNOSIS — Z96.642 STATUS POST TOTAL REPLACEMENT OF LEFT HIP: ICD-10-CM

## 2022-01-12 DIAGNOSIS — D50.8 OTHER IRON DEFICIENCY ANEMIA: ICD-10-CM

## 2022-01-12 DIAGNOSIS — G25.81 RESTLESS LEGS SYNDROME (RLS): ICD-10-CM

## 2022-01-12 PROBLEM — R11.2 PONV (POSTOPERATIVE NAUSEA AND VOMITING): Chronic | Status: RESOLVED | Noted: 2021-12-13 | Resolved: 2022-01-12

## 2022-01-12 PROBLEM — Z98.890 PONV (POSTOPERATIVE NAUSEA AND VOMITING): Chronic | Status: RESOLVED | Noted: 2021-12-13 | Resolved: 2022-01-12

## 2022-01-12 LAB
ANION GAP SERPL CALCULATED.3IONS-SCNC: 8 MMOL/L (ref 4–13)
BASOPHILS # BLD AUTO: 0.05 THOUSANDS/ΜL (ref 0–0.1)
BASOPHILS NFR BLD AUTO: 1 % (ref 0–1)
BUN SERPL-MCNC: 21 MG/DL (ref 5–25)
CALCIUM SERPL-MCNC: 9.1 MG/DL (ref 8.3–10.1)
CHLORIDE SERPL-SCNC: 101 MMOL/L (ref 100–108)
CO2 SERPL-SCNC: 32 MMOL/L (ref 21–32)
CREAT SERPL-MCNC: 0.73 MG/DL (ref 0.6–1.3)
EOSINOPHIL # BLD AUTO: 0.24 THOUSAND/ΜL (ref 0–0.61)
EOSINOPHIL NFR BLD AUTO: 3 % (ref 0–6)
ERYTHROCYTE [DISTWIDTH] IN BLOOD BY AUTOMATED COUNT: 13.2 % (ref 11.6–15.1)
FERRITIN SERPL-MCNC: 99 NG/ML (ref 8–388)
GFR SERPL CREATININE-BSD FRML MDRD: 89 ML/MIN/1.73SQ M
GLUCOSE SERPL-MCNC: 105 MG/DL (ref 65–140)
HCT VFR BLD AUTO: 35.9 % (ref 34.8–46.1)
HGB BLD-MCNC: 11.5 G/DL (ref 11.5–15.4)
IMM GRANULOCYTES # BLD AUTO: 0.05 THOUSAND/UL (ref 0–0.2)
IMM GRANULOCYTES NFR BLD AUTO: 1 % (ref 0–2)
IRON SATN MFR SERPL: 18 % (ref 15–50)
IRON SERPL-MCNC: 67 UG/DL (ref 50–170)
LYMPHOCYTES # BLD AUTO: 2.59 THOUSANDS/ΜL (ref 0.6–4.47)
LYMPHOCYTES NFR BLD AUTO: 31 % (ref 14–44)
MCH RBC QN AUTO: 30.2 PG (ref 26.8–34.3)
MCHC RBC AUTO-ENTMCNC: 32 G/DL (ref 31.4–37.4)
MCV RBC AUTO: 94 FL (ref 82–98)
MONOCYTES # BLD AUTO: 0.98 THOUSAND/ΜL (ref 0.17–1.22)
MONOCYTES NFR BLD AUTO: 12 % (ref 4–12)
NEUTROPHILS # BLD AUTO: 4.56 THOUSANDS/ΜL (ref 1.85–7.62)
NEUTS SEG NFR BLD AUTO: 52 % (ref 43–75)
NRBC BLD AUTO-RTO: 0 /100 WBCS
PLATELET # BLD AUTO: 261 THOUSANDS/UL (ref 149–390)
PMV BLD AUTO: 8.7 FL (ref 8.9–12.7)
POTASSIUM SERPL-SCNC: 3.7 MMOL/L (ref 3.5–5.3)
RBC # BLD AUTO: 3.81 MILLION/UL (ref 3.81–5.12)
SODIUM SERPL-SCNC: 141 MMOL/L (ref 136–145)
TIBC SERPL-MCNC: 370 UG/DL (ref 250–450)
TSH SERPL DL<=0.05 MIU/L-ACNC: 1.21 UIU/ML (ref 0.36–3.74)
WBC # BLD AUTO: 8.47 THOUSAND/UL (ref 4.31–10.16)

## 2022-01-12 PROCEDURE — 80048 BASIC METABOLIC PNL TOTAL CA: CPT

## 2022-01-12 PROCEDURE — 85025 COMPLETE CBC W/AUTO DIFF WBC: CPT | Performed by: FAMILY MEDICINE

## 2022-01-12 PROCEDURE — 99214 OFFICE O/P EST MOD 30 MIN: CPT | Performed by: FAMILY MEDICINE

## 2022-01-12 PROCEDURE — 83550 IRON BINDING TEST: CPT

## 2022-01-12 PROCEDURE — 82728 ASSAY OF FERRITIN: CPT

## 2022-01-12 PROCEDURE — 83540 ASSAY OF IRON: CPT

## 2022-01-12 PROCEDURE — 36415 COLL VENOUS BLD VENIPUNCTURE: CPT | Performed by: FAMILY MEDICINE

## 2022-01-12 PROCEDURE — 84443 ASSAY THYROID STIM HORMONE: CPT

## 2022-01-12 NOTE — PROGRESS NOTES
Assessment/Plan:    Restless legs syndrome (RLS)  Stable on current meds     Depression  celexa as directed    Status post total replacement of left hip  Stable   No pain currently   Care per ortho    Primary localized osteoarthritis of left hip  S/p surgery 12/13/2021       Diagnoses and all orders for this visit:    Other iron deficiency anemia  Comments:  to recheck labs  may need iron transfusion   referral to hematology  Orders:  -     CBC and differential  -     Iron Panel (Includes Ferritin, Iron Sat%, Iron, and TIBC); Future  -     Ambulatory referral to Hematology / Oncology; Future    Dizziness  -     Basic metabolic panel; Future  -     TSH, 3rd generation with Free T4 reflex; Future    Chronic fatigue  -     TSH, 3rd generation with Free T4 reflex; Future    Restless legs syndrome (RLS)    Mild episode of recurrent major depressive disorder (HCC)    Status post total replacement of left hip          Subjective:      Patient ID: Adelina Keating is a 64 y o  female  HPI  C/o dizziness with exertion and fatigue for 2 weeks since surgery  Her Hemoglobin and iron level were low post op  Taking ferrous sulfate 324 mg twice a day for 2 weeks now  Resting makes it better  No chest pain , palpitations or shortness of breath   Drinking 90 oz of water a day     The following portions of the patient's history were reviewed and updated as appropriate: allergies, current medications, past family history, past medical history, past social history, past surgical history and problem list     Review of Systems   Constitutional: Negative  HENT: Negative  Respiratory: Negative  Cardiovascular: Negative  Musculoskeletal: Negative  Skin: Positive for pallor  Neurological: Positive for dizziness and light-headedness  Hematological: Negative for adenopathy  Does not bruise/bleed easily           Objective:      /86 (BP Location: Left arm, Patient Position: Sitting, Cuff Size: Adult)   Pulse 76   Temp 98 7 °F (37 1 °C) (Tympanic)   Resp 16   Ht 5' 4" (1 626 m)   Wt 81 9 kg (180 lb 9 6 oz)   SpO2 98%   BMI 31 00 kg/m²          Physical Exam  Vitals reviewed  Constitutional:       Appearance: Normal appearance  Cardiovascular:      Rate and Rhythm: Normal rate and regular rhythm  Pulses: Normal pulses  Heart sounds: Normal heart sounds  Pulmonary:      Effort: Pulmonary effort is normal       Breath sounds: Normal breath sounds  Neurological:      General: No focal deficit present  Mental Status: She is alert and oriented to person, place, and time

## 2022-01-13 ENCOUNTER — TELEPHONE (OUTPATIENT)
Dept: HEMATOLOGY ONCOLOGY | Facility: CLINIC | Age: 62
End: 2022-01-13

## 2022-01-13 ENCOUNTER — OFFICE VISIT (OUTPATIENT)
Dept: PHYSICAL THERAPY | Facility: CLINIC | Age: 62
End: 2022-01-13
Payer: COMMERCIAL

## 2022-01-13 DIAGNOSIS — M16.12 ARTHRITIS OF LEFT HIP: Primary | ICD-10-CM

## 2022-01-13 DIAGNOSIS — Z96.642 STATUS POST TOTAL REPLACEMENT OF LEFT HIP: ICD-10-CM

## 2022-01-13 PROCEDURE — 97110 THERAPEUTIC EXERCISES: CPT | Performed by: PHYSICAL THERAPIST

## 2022-01-13 PROCEDURE — 97112 NEUROMUSCULAR REEDUCATION: CPT | Performed by: PHYSICAL THERAPIST

## 2022-01-13 NOTE — PROGRESS NOTES
Daily Note     Today's date: 2022  Patient name: Sarah Swanson  : 1960  MRN: [id number]  Referring provider: Sg Strong PA-C  Dx:   Encounter Diagnosis     ICD-10-CM    1  Arthritis of left hip  M16 12    2  Status post total replacement of left hip  Z96 642                   Subjective: No new complaints      Objective: See treatment diary below      Assessment: Progressing well with all aspects of PT  Plan: Continue per plan of care  Precautions: L JOSEPH - anterior      Manuals       L hip PROM SURYA 8' 8 SURYA 8 SURYA 8' SURYA 5' SURYA 5' SURYA 5'      Log roll 2'                                      Neuro Re-Ed             bridge NP P!            Hip abd   MRe 2x15 MRE 2x15         SLS foam    45s x6 45s x6 1' x6 1'x6      Standing hip abd/ext on foam     2x10 2x15       lunges     2x15  2x15      Hip flexion on foam      3s x20 On step x20      Resisted walking      30#x10 50# x10      Ther Ex             Recumbent bike NP 5 min  10' 10' Man lv 1 x10 Man Lv 2 x10 Man LV 2 x10      Supine hip abduction 2x15 2 x 15  D/c          saq/Laq 2x15 2x 15  4# 2x15 5s D/c         Hooklying hip flexion 25 10" x 10  x10 2x10 2x10        Side stepping  2 x 10  x7 rtb x5 RTB x7 RTB x7 GTB x10      Step up  4" x 20  6" x20 8" x25         Step up lateral  4" x 20  6" x20 8" x25 2x20 2x20 2x20      Mini squats  2x 10  2x15 2x15 2x15 2x15 foam 2x15      Ther Activity             Tmill       x7                   Gait Training                                       Modalities

## 2022-01-18 ENCOUNTER — OFFICE VISIT (OUTPATIENT)
Dept: OBGYN CLINIC | Facility: CLINIC | Age: 62
End: 2022-01-18

## 2022-01-18 VITALS
WEIGHT: 180 LBS | DIASTOLIC BLOOD PRESSURE: 76 MMHG | BODY MASS INDEX: 30.73 KG/M2 | HEART RATE: 73 BPM | HEIGHT: 64 IN | SYSTOLIC BLOOD PRESSURE: 124 MMHG

## 2022-01-18 DIAGNOSIS — Z96.642 STATUS POST TOTAL REPLACEMENT OF LEFT HIP: Primary | ICD-10-CM

## 2022-01-18 PROCEDURE — 99024 POSTOP FOLLOW-UP VISIT: CPT | Performed by: ORTHOPAEDIC SURGERY

## 2022-01-18 NOTE — PROGRESS NOTES
64 y o female presents for 5 weeks postoperative visit status post left  Anterior JOSEPH  Patient states he is doing very well she states her incision is fully healed this time she has no pain numbness or tingling regarding left lower extremity is very happy regarding outcome of her left total hip arthroplasty at this time      Review of Systems  Review of systems negative unless otherwise specified in HPI    Past Medical History  Past Medical History:   Diagnosis Date    Anxiety     Arthritis     Cancer ([facility] [address] )     Depression     Melanoma ([facility] [address] ) 2018    Left upper arm    PONV (postoperative nausea and vomiting)        Past Surgical History  Past Surgical History:   Procedure Laterality Date     SECTION       SECTION      FL INJECTION LEFT HIP (ARTHROGRAM)  10/24/2018    FL INJECTION LEFT HIP (NON ARTHROGRAM)  2018    FL INJECTION LEFT HIP (NON ARTHROGRAM)  2019    FL INJECTION LEFT HIP (NON ARTHROGRAM)  2019    FL INJECTION LEFT HIP (NON ARTHROGRAM)  2019    FL INJECTION LEFT HIP (NON ARTHROGRAM)  2020    FL INJECTION LEFT HIP (NON ARTHROGRAM)  2020    FL INJECTION LEFT HIP (NON ARTHROGRAM)  2021    KNEE ARTHROSCOPY      PA TOTAL HIP ARTHROPLASTY Left 2021    Procedure: ARTHROPLASTY HIP TOTAL ANTERIOR;  Surgeon: Andria Alexis MD;  Location: [facility];  Service: Orthopedics    ROTATOR CUFF REPAIR      SHOULDER SURGERY      SKIN BIOPSY      WISDOM TOOTH EXTRACTION         Current Medications  Current Outpatient Medications on File Prior to Visit   Medication Sig Dispense Refill    ascorbic acid (VITAMIN C) 500 MG tablet Take 1 tablet (500 mg total) by mouth daily 60 tablet 0    aspirin (ECOTRIN LOW STRENGTH) 81 mg EC tablet Take 1 tablet (81 mg total) by mouth 2 (two) times a day Please do not start taking until after total joint arthroplasty 70 tablet 0    citalopram (CeleXA) 40 mg tablet Take 1 tablet (40 mg total) by mouth daily 90 tablet 3    docusate sodium (COLACE) 100 mg capsule Take 1 capsule (100 mg total) by mouth 2 (two) times a day 10 capsule 0    ferrous sulfate 324 (65 Fe) mg Take 1 tablet (324 mg total) by mouth 2 (two) times a day before meals 60 tablet 0    folic acid (FOLVITE) 1 mg tablet Take 1 tablet (1 mg total) by mouth daily (Patient not taking: Reported on 1/12/2022 ) 30 tablet 0    LORazepam (ATIVAN) 0 5 mg tablet TAKE ONE TABLET BY MOUTH ONCE DAILY AT BEDTIME AS NEEDED FOR ANXIETY (Patient not taking: Reported on 1/18/2022) 20 tablet 0    Multiple Vitamin (MULTI-VITAMIN DAILY PO)       pramipexole (MIRAPEX) 0 25 mg tablet 1 1/2 tabs daily 135 tablet 0     No current facility-administered medications on file prior to visit         Recent Labs (HCT,HGB,PT,INR,ESR,CRP,GLU,HgA1C)  0   Lab Value Date/Time    HCT 35 9 01/12/2022 1620    HGB 11 5 01/12/2022 1620    WBC 8 47 01/12/2022 1620    INR 0 91 11/21/2021 0920    HGBA1C 5 4 11/21/2021 0920         Physical exam  · General: Awake, Alert, Oriented  · Eyes: Pupils equal, round and reactive to light  · Heart: regular rate and rhythm  · Lungs: No audible wheezing    left Lower extremity  · Examination patient's left hip incision well healed no erythema incisions well approximated no tenderness to palpation at the flexion 90° no pain with internal-external rotation active straight leg raise ankle dorsi plantar flexion strength 5/5 sensation intact distal pulses present    Assessment:   Status post 5 weeks left anterior total hip arthroplasty doing well   Plan:  Weightbearing as tolerated left lower extremity   Case discussed with Dr Mary Ellen Sanchez   Anterior total hip precautions reviewed   Physical therapy range of motion stretching strengthening  Follow-up in 7 weeks time repeat x-rays left hip

## 2022-01-18 NOTE — LETTER
January 18, 2022     Patient: Candy Amaro   YOB: 1960   Date of Visit: 1/18/2022       To Whom it May Concern:    Candy Amaro is under my professional care  She was seen in my office on 1/18/2022  She may return to work on February 7, 2022  If you have any questions or concerns, please don't hesitate to call           Sincerely,          Juliette Mendez MD        CC: Candy Amaro

## 2022-01-20 ENCOUNTER — OFFICE VISIT (OUTPATIENT)
Dept: PHYSICAL THERAPY | Facility: CLINIC | Age: 62
End: 2022-01-20
Payer: COMMERCIAL

## 2022-01-20 DIAGNOSIS — Z96.642 STATUS POST TOTAL REPLACEMENT OF LEFT HIP: Primary | ICD-10-CM

## 2022-01-20 PROCEDURE — 97112 NEUROMUSCULAR REEDUCATION: CPT | Performed by: PHYSICAL THERAPIST

## 2022-01-20 PROCEDURE — 97110 THERAPEUTIC EXERCISES: CPT | Performed by: PHYSICAL THERAPIST

## 2022-01-20 NOTE — PROGRESS NOTES
Daily Note     Today's date: 2022  Patient name: Amy Rodrigez  : 1960  MRN: [id number]  Referring provider: Tesfaye Kauffman PA-C  Dx:   Encounter Diagnosis     ICD-10-CM    1  Status post total replacement of left hip  Z96 642                   Subjective: Returned to gym without problems      Objective: See treatment diary below      Assessment: Tolerated treatment well  Patient demonstrated fatigue post treatment      Plan: Continue per plan of care  Precautions: L JOSEPH - anterior      Manuals       L hip PROM SURYA 8' 8 SURYA 8 SURYA 8' SURYA 5' SURYA 5'       Log roll 2'                                      Neuro Re-Ed             bridge NP P!            Hip abd   MRe 2x15 MRE 2x15         SLS foam    45s x6 45s x6 1' x6 1'x6      Standing hip abd/ext on foam     2x10 2x15       lunges     2x15  2x15      Hip flexion on foam      3s x20 On step x20      Resisted walking      30#x10 40# x10      Ther Ex             Recumbent bike NP 5 min  10' 10' Man lv 1 x10 Man Lv 2 x10 Man LV 2 x10      Supine hip abduction 2x15 2 x 15  D/c          Tmill 2x15 2x 15  4# 2x15 5s D/c   10' 4%      Hooklying hip flexion 25 10" x 10  x10 2x10 2x10        Side stepping  2 x 10  x7 rtb x5 RTB x7 RTB x7 GTB x10      Step up  4" x 20  6" x20 8" x25         Step up lateral  4" x 20  6" x20 8" x25 2x20 2x20 2x20      Mini squats  2x 10  2x15 2x15 2x15 2x15 foam 2x15      Ther Activity             Tmill       x7                   Gait Training                                       Modalities

## 2022-01-27 ENCOUNTER — APPOINTMENT (OUTPATIENT)
Dept: PHYSICAL THERAPY | Facility: CLINIC | Age: 62
End: 2022-01-27
Payer: COMMERCIAL

## 2022-01-31 ENCOUNTER — TELEPHONE (OUTPATIENT)
Dept: HEMATOLOGY ONCOLOGY | Facility: CLINIC | Age: 62
End: 2022-01-31

## 2022-02-03 ENCOUNTER — OFFICE VISIT (OUTPATIENT)
Dept: PHYSICAL THERAPY | Facility: CLINIC | Age: 62
End: 2022-02-03
Payer: COMMERCIAL

## 2022-02-03 DIAGNOSIS — Z96.642 STATUS POST TOTAL REPLACEMENT OF LEFT HIP: Primary | ICD-10-CM

## 2022-02-03 DIAGNOSIS — M16.12 ARTHRITIS OF LEFT HIP: ICD-10-CM

## 2022-02-03 PROCEDURE — 97110 THERAPEUTIC EXERCISES: CPT

## 2022-02-03 PROCEDURE — 97112 NEUROMUSCULAR REEDUCATION: CPT

## 2022-02-03 NOTE — PROGRESS NOTES
Daily Note     Today's date: 2/3/2022  Patient name: Adelina Keating  : 1960  MRN: [id number]  Referring provider: Malachi Valero PA-C  Dx:   Encounter Diagnosis     ICD-10-CM    1  Status post total replacement of left hip  Z96 642    2  Arthritis of left hip  M16 12        Start Time: 1400  Stop Time: 4465  Total time in clinic (min): 54 minutes    Subjective: Pt reports that she is back to playing tennis  Only complaint is flexibility, such as reaching down to tie her shoes  Objective: See treatment diary below  Improved FOTO score    Assessment: Tolerated treatment well  Patient is progressing well at this time  Plan: Continue per plan of care  Precautions: L JOSEPH - anterior      Manuals      L hip PROM SURYA 8' 8 SURYA 8 SURYA 8' SURYA 5' SURYA 5'       Log roll 2'                                      Neuro Re-Ed             bridge NP P!            Hip abd   MRe 2x15 MRE 2x15         SLS foam    45s x6 45s x6 1' x6 1'x6 1'x6      Standing hip abd/ext on foam     2x10 2x15       lunges     2x15  2x15 2x15     Hip flexion on foam      3s x20 On step x20      Resisted walking      30#x10 40# x10 45# x10     Ther Ex             Recumbent bike NP 5 min  10' 10' Man lv 1 x10 Man Lv 2 x10 Man LV 2 x10 L2  10'      Supine hip abduction 2x15 2 x 15  D/c          Tmill 2x15 2x 15  4# 2x15 5s D/c   10' 4% 10' 3%     Hooklying hip flexion 25 10" x 10  x10 2x10 2x10        Side stepping  2 x 10  x7 rtb x5 RTB x7 RTB x7 GTB x10 GTB x10     Step up  4" x 20  6" x20 8" x25         Step up lateral  4" x 20  6" x20 8" x25 2x20 2x20 2x20 2x10     Mini squats  2x 10  2x15 2x15 2x15 2x15 foam 2x15 Foam 2x15     Ther Activity             Tmill       x7                   Gait Training                                       Modalities

## 2022-02-07 ENCOUNTER — TELEPHONE (OUTPATIENT)
Dept: HEMATOLOGY ONCOLOGY | Facility: CLINIC | Age: 62
End: 2022-02-07

## 2022-02-28 ENCOUNTER — APPOINTMENT (OUTPATIENT)
Dept: PHYSICAL THERAPY | Facility: CLINIC | Age: 62
End: 2022-02-28
Payer: COMMERCIAL

## 2022-03-08 ENCOUNTER — OFFICE VISIT (OUTPATIENT)
Dept: DERMATOLOGY | Facility: CLINIC | Age: 62
End: 2022-03-08
Payer: COMMERCIAL

## 2022-03-08 VITALS — BODY MASS INDEX: 32.1 KG/M2 | WEIGHT: 187 LBS

## 2022-03-08 DIAGNOSIS — D22.60 MULTIPLE BENIGN MELANOCYTIC NEVI OF UPPER AND LOWER EXTREMITIES AND TRUNK: Primary | ICD-10-CM

## 2022-03-08 DIAGNOSIS — D48.5 NEOPLASM OF UNCERTAIN BEHAVIOR OF SKIN: ICD-10-CM

## 2022-03-08 DIAGNOSIS — D18.01 CHERRY ANGIOMA: ICD-10-CM

## 2022-03-08 DIAGNOSIS — D22.5 MULTIPLE BENIGN MELANOCYTIC NEVI OF UPPER AND LOWER EXTREMITIES AND TRUNK: Primary | ICD-10-CM

## 2022-03-08 DIAGNOSIS — D22.70 MULTIPLE BENIGN MELANOCYTIC NEVI OF UPPER AND LOWER EXTREMITIES AND TRUNK: Primary | ICD-10-CM

## 2022-03-08 PROCEDURE — 11102 TANGNTL BX SKIN SINGLE LES: CPT | Performed by: DERMATOLOGY

## 2022-03-08 PROCEDURE — 88305 TISSUE EXAM BY PATHOLOGIST: CPT | Performed by: PATHOLOGY

## 2022-03-08 PROCEDURE — 99214 OFFICE O/P EST MOD 30 MIN: CPT | Performed by: DERMATOLOGY

## 2022-03-08 NOTE — PATIENT INSTRUCTIONS
Assessment and Plan:  Based on a thorough discussion of this condition and the management approach to it (including a comprehensive discussion of the known risks, side effects and potential benefits of treatment), the patient (family) agrees to implement the following specific plan:   Continue routine skin screenings    When outside we recommend using a wide brim hat, sunglasses, long sleeve and pants, sunscreen with SPF 01+ with reapplication every 2 hours, or SPF specific clothing     What happens at follow-up? The main purpose of follow-up is to detect recurrences early (metastatic melanoma), but it also offers an opportunity to diagnose a new primary melanoma at the first possible opportunity  A second invasive melanoma occurs in 5-10% of melanoma patients and a new melanoma in situ is diagnosed in more than 20% of melanoma patients  Our practice makes the following recommendations for follow-up for patients with invasive melanoma   At-least "monthly" self-skin examinations    Routine skin checks by a board certified dermatologist   Follow-up intervals are "every 3 months" within 2 years of a new melanoma diagnosis; "every 6 months" between 2-4 years of a new melanoma diagnosis; and "annually" after 4 years of a new melanoma diagnosis   Individual patient's needs should be considered before an appropriate follow-up is offered   Provide education and support to help the patient adjust to their illness    Follow-up appointments should include:   A check of the scar where the primary melanoma was removed   Checking the regional lymph nodes   A general skin examination   A full physical examination at least annually by your primary care physician    In those with more advanced primary disease, follow-up may include:   Blood tests   Imaging: ultrasound, X-ray, CT, MRI and PET scan      Most tests are not worthwhile for patients with stage 1 or 2 melanoma unless there are signs or symptoms of disease recurrence or metastasis  No tests are necessary for healthy patients who have remained well for five years or longer after removal of their melanoma  What is the outlook for patients with melanoma?  Melanoma in situ is cured by excision because it has no potential to spread around the body   The risk of spread and ultimate death from invasive melanoma depends on several factors, but the main one is the Breslow thickness of the melanoma at the time it was surgically removed   Metastases are rare for melanomas < 0 75 mm and the risk for tumours 0 75-1 mm thick is about 5%  The risk steadily increases with thickness so that melanomas > 4 mm have a risk of metastasis of about 40%  Melanoma is a potentially serious type of skin cancer, in which there is uncontrolled growth of melanocytes (pigment cells)  Melanoma is sometimes called malignant melanoma  Normal melanocytes are found in the basal layer of the epidermis (the outer layer of skin)  Melanocytes produce a protein called melanin, which protects skin cells by absorbing ultraviolet (UV) radiation  Melanocytes are found in equal numbers in black and white skin, but melanocytes in black skin produce much more melanin  People with dark brown or black skin are very much less likely to be damaged by UV radiation than those with white skin  Assessment and Plan:  Based on a thorough discussion of this condition and the management approach to it (including a comprehensive discussion of the known risks, side effects and potential benefits of treatment), the patient (family) agrees to implement the following specific plan:   Reassured benign     Assessment and Plan:    Cherry angioma, also known as Zay Curling spots, are benign vascular skin lesions  A "cherry angioma" is a firm red, blue or purple papule, 0 1-1 cm in diameter   When thrombosed, they can appear black in colour until evaluated with a dermatoscope when the red or purple colour is more easily seen  Cherry angioma may develop on any part of the body but most often appear on the scalp, face, lips and trunk  An angioma is due to proliferating endothelial cells; these are the cells that line the inside of a blood vessel  Angiomas can arise in early life or later in life; the most common type of angioma is a cherry angioma  Cherry angiomas are very common in males and females of any age or race  They are more noticeable in white skin than in skin of colour  They markedly increase in number from about the age of 36  There may be a family history of similar lesions  Eruptive cherry angiomas have been rarely reported to be associated with internal malignancy  The cause of angiomas is unknown  Genetic analysis of cherry angiomas has shown that they frequently carry specific somatic missense mutations in the GNAQ and GNA11 (Q209H) genes, which are involved in other vascular and melanocytic proliferations  Cherry angioma is usually diagnosed clinically and no investigations are necessary for the majority of lesions  It has a characteristic red-clod or lobular pattern on dermatoscopy (called lacunar pattern using conventional pattern analysis)  When there is uncertainty about the diagnosis, a biopsy may be performed  The angioma is composed of venules in a thickened papillary dermis  Collagen bundles may be prominent between the lobules  Cherry angiomas are harmless, so they do not usually have to be treated  Occasionally, they are removed to exclude a malignant skin lesion such as a nodular melanoma or because they are irritated or bleeding (and a subsequent risk for infection)  To decrease friction over the lesions, we recommend Neutrogena Daily Defense SPF 50+ at least 3 times a day      Assessment and Plan:  Based on a thorough discussion of this condition and the management approach to it (including a comprehensive discussion of the known risks, side effects and potential benefits of treatment), the patient (family) agrees to implement the following specific plan:   Monitor for changes   When outside we recommend using a wide brim hat, sunglasses, long sleeve and pants, sunscreen with SPF 44+ with reapplication every 2 hours, or SPF specific clothing    Recommend yearly skin exams or sooner if anything of concern develops  Melanocytic Nevi  Melanocytic nevi ("moles") are tan or brown, raised or flat areas of the skin which have an increased number of melanocytes  Melanocytes are the cells in our body which make pigment and account for skin color  Some moles are present at birth (I e , "congenital nevi"), while others come up later in life (i e , "acquired nevi")  The sun can stimulate the body to make more moles  Sunburns are not the only thing that triggers more moles  Chronic sun exposure can do it too  Clinically distinguishing a healthy mole from melanoma may be difficult, even for experienced dermatologists  The "ABCDE's" of moles have been suggested as a means of helping to alert a person to a suspicious mole and the possible increased risk of melanoma  The suggestions for raising alert are as follows:    Asymmetry: Healthy moles tend to be symmetric, while melanomas are often asymmetric  Asymmetry means if you draw a line through the mole, the two halves do not match in color, size, shape, or surface texture  Asymmetry can be a result of rapid enlargement of a mole, the development of a raised area on a previously flat lesion, scaling, ulceration, bleeding or scabbing within the mole  Any mole that starts to demonstrate "asymmetry" should be examined promptly by a board certified dermatologist      Border: Healthy moles tend to have discrete, even borders  The border of a melanoma often blends into the normal skin and does not sharply delineate the mole from normal skin    Any mole that starts to demonstrate "uneven borders" should be examined promptly by a board certified dermatologist      Color: Healthy moles tend to be one color throughout  Melanomas tend to be made up of different colors ranging from dark black, blue, white, or red  Any mole that demonstrates a color change should be examined promptly by a board certified dermatologist      Diameter: Healthy moles tend to be smaller than 0 6 cm in size; an exception are "congenital nevi" that can be larger  Melanomas tend to grow and can often be greater than 0 6 cm (1/4 of an inch, or the size of a pencil eraser)  This is only a guideline, and many normal moles may be larger than 0 6 cm without being unhealthy  Any mole that starts to change in size (small to bigger or bigger to smaller) should be examined promptly by a board certified dermatologist      Evolving: Healthy moles tend to "stay the same "  Melanomas may often show signs of change or evolution such as a change in size, shape, color, or elevation  Any mole that starts to itch, bleed, crust, burn, hurt, or ulcerate or demonstrate a change or evolution should be examined promptly by a board certified dermatologist       Dysplastic Nevi  Dysplastic moles are moles that fit the ABCDE rules of melanoma but are not identified as melanomas when examined under the microscope  They may indicate an increased risk of melanoma in that person  If there is a family history of melanoma, most experts agree that the person may be at an increased risk for developing a melanoma  Experts still do not agree on what dysplastic moles mean in patients without a personal or family history of melanoma  Dysplastic moles are usually larger than common moles and have different colors within it with irregular borders  The appearance can be very similar to a melanoma  Biopsies of dysplastic moles may show abnormalities which are different from a regular mole  Melanoma  Malignant melanoma is a type of skin cancer that can be deadly if it spreads throughout the body   The incidence of melanoma in the [place] is growing faster than any other cancer  Melanoma usually grows near the surface of the skin for a period of time, and then begins to grow deeper into the skin  Once it grows deeper into the skin, the risk of spread to other organs greatly increases  Therefore, early detection and removal of a malignant melanoma may result in a better chance at a complete cure; removal after the tumor has spread may not be as effective, leading to worse clinical outcomes such as death  The true rate of nevus transformation into a melanoma is unknown  It has been estimated that the lifetime risk for any acquired melanocytic nevus on any 21year-old individual transforming into melanoma by age [de-identified] is 0 03% (1 in 3,164) for men and 0 009% (1 in 10,800) for women  The appearance of a "new mole" remains one of the most reliable methods for identifying a malignant melanoma  Occasionally, melanomas appear as rapidly growing, blue-black, dome-shaped bumps within a previous mole or previous area of normal skin  Other times, melanomas are suspected when a mole suddenly appears or changes  Itching, burning, or pain in a pigmented lesion should increase suspicion, but most patients with early melanoma have no skin discomfort whatsoever  Melanoma can occur anywhere on the skin, including areas that are difficult for self-examination  Many melanomas are first noticed by other family members  Suspicious-looking moles may be removed for microscopic examination  You may be able to prevent death from melanoma by doing two simple things:    1  Try to avoid unnecessary sun exposure and protect your skin when it is exposed to the sun  People who live near the equator, people who have intermittent exposures to large amounts of sun, and people who have had sunburns in childhood or adolescence have an increased risk for melanoma   Sun sense and vigilant sun protection may be keys to helping to prevent melanoma  We recommend wearing UPF-rated sun protective clothing and sunglasses whenever possible and applying a moisturizer-sunscreen combination product (SPF 50+) such as Neutrogena Daily Defense to sun exposed areas of skin at least three times a day  2  Have your moles regularly examined by a board certified dermatologist AND by yourself or a family member/friend at home  We recommend that you have your moles examined at least once a year by a board certified dermatologist   Use your birthday as an annual reminder to have your "Birthday Suit" (I e , your skin) examined; it is a nice birthday gift to yourself to know that your skin is healthy appearing! Additionally, at-home self examinations may be helpful for detecting a possible melanoma  Use the ABCDEs we discussed and check your moles once a month at home  Assessment and Plan:   I have discussed with the patient that a sample of skin via a "skin biopsy would be potentially helpful to further make a specific diagnosis under the microscope   Based on a thorough discussion of this condition and the management approach to it (including a comprehensive discussion of the known risks, side effects and potential benefits of treatment), the patient (family) agrees to implement the following specific plan:    o Procedure:  Skin Biopsy  After a thorough discussion of treatment options and risk/benefits/alternatives (including but not limited to local pain, scarring, dyspigmentation, blistering, possible superinfection, and inability to confirm a diagnosis via histopathology), verbal and written consent were obtained and portion of the rash was biopsied for tissue sample  See below for consent that was obtained from patient and subsequent Procedure Note        INFORMED CONSENT DISCUSSION AND POST-OPERATIVE INSTRUCTIONS FOR PATIENT    I   RATIONALE FOR PROCEDURE  I understand that a skin biopsy allows the Dermatologist to test a lesion or rash under the microscope to obtain a diagnosis  It usually involves numbing the area with numbing medication and removing a small piece of skin; sometimes the area will be closed with sutures  In this specific procedure, sutures are not usually needed  If any sutures are placed, then they are usually need to be removed in 2 weeks or less  I understand that my Dermatologist recommends that a skin "shave" biopsy be performed today  A local anesthetic, similar to the kind that a dentist uses when filling a cavity, will be injected with a very small needle into the skin area to be sampled  The injected skin and tissue underneath "will go to sleep and become numb so no pain should be felt afterwards  An instrument shaped like a tiny "razor blade" (shave biopsy instrument) will be used to cut a small piece of tissue and skin from the area so that a sample of tissue can be taken and examined more closely under the microscope  A slight amount of bleeding will occur, but it will be stopped with direct pressure and a pressure bandage and any other appropriate methods  I understands that a scar will form where the wound was created  Surgical ointment will be applied to help protect the wound  Sutures are not usually needed  II   RISKS AND POTENTIAL COMPLICATIONS   I understand the risks and potential complications of a skin biopsy include but are not limited to the following:   Bleeding   Infection   Pain   Scar/keloid   Skin discoloration   Incomplete Removal   Recurrence   Nerve Damage/Numbness/Loss of Function   Allergic Reaction to Anesthesia   Biopsies are diagnostic procedures and based on findings additional treatment or evaluation may be required   Loss or destruction of specimen resulting in no additional findings    My Dermatologist has explained to me the nature of the condition, the nature of the procedure, and the benefits to be reasonably expected compared with alternative approaches    My Dermatologist has discussed the likelihood of major risks or complications of this procedure including the specific risks listed above, such as bleeding, infection, and scarring/keloid  I understand that a scar is expected after this procedure  I understand that my physician cannot predict if the scar will form a "keloid," which extends beyond the borders of the wound that is created  A keloid is a thick, painful, and bumpy scar  A keloid can be difficult to treat, as it does not always respond well to therapy, which includes injecting cortisone directly into the keloid every few weeks  While this usually reduces the pain and size of the scar, it does not eliminate it  I understand that photographs may be taken before and after the procedure  These will be maintained as part of the medical providers confidential records and may not be made available to me  I further authorize the medical provider to use the photographs for teaching purposes or to illustrate scientific papers, books, or lectures if in his/her judgment, medical research, education, or science may benefit from its use  I have had an opportunity to fully inquire about the risks and benefits of this procedure and its alternatives  I have been given ample time and opportunity to ask questions and to seek a second opinion if I wished to do so  I acknowledge that there have specifically been no guarantees as to the cosmetic results from the procedure  I am aware that with any procedure there is always the possibility of an unexpected complication  III  POST-PROCEDURAL CARE (WHAT YOU WILL NEED TO DO "AFTER THE BIOPSY" TO OPTIMIZE HEALING)     Keep the area clean and dry  Try NOT to remove the bandage or get it wet for the first 24 hours       Gently clean the area and apply surgical ointment (such as Vaseline petrolatum ointment, which is available "over the counter" and not a prescription) to the biopsy site for up to 2 weeks straight  This acts to protect the wound from the outside world   Sutures are not usually placed in this procedure  If any sutures were placed, return for suture removal as instructed (generally 1 week for the face, 2 weeks for the body)   Take Acetaminophen (Tylenol) for discomfort, if no contraindications  Ibuprofen or aspirin could make bleeding worse   Call our office immediately for signs of infection: fever, chills, increased redness, warmth, tenderness, discomfort/pain, or pus or foul smell coming from the wound  WHAT TO DO IF THERE IS ANY BLEEDING? If a small amount of bleeding is noticed, place a clean cloth over the area and apply firm pressure for ten minutes  Check the wound after 10 minutes of direct pressure  If bleeding persists, try one more time for an additional 10 minutes of direct pressure on the area  If the bleeding becomes heavier or does not stop after the second attempt, or if you have any other questions about this procedure, then please call your [facility]'s Dermatologist by calling [phone] ([phone])  I hereby acknowledge that I have reviewed and verified the site with my Dermatologist and have requested and authorized my Dermatologist to proceed with the procedure

## 2022-03-08 NOTE — PROGRESS NOTES
[facility] Follow Up Note    Patient Name: Dani Vela  Encounter Date: 03/08/22      Today's Chief Concerns:  Obed Lorenz Concern #1:  Skin exam       Current Medications:    Current Outpatient Medications:     ascorbic acid (VITAMIN C) 500 MG tablet, Take 1 tablet (500 mg total) by mouth daily, Disp: 60 tablet, Rfl: 0    aspirin (ECOTRIN LOW STRENGTH) 81 mg EC tablet, Take 1 tablet (81 mg total) by mouth 2 (two) times a day Please do not start taking until after total joint arthroplasty, Disp: 70 tablet, Rfl: 0    citalopram (CeleXA) 40 mg tablet, Take 1 tablet (40 mg total) by mouth daily, Disp: 90 tablet, Rfl: 3    docusate sodium (COLACE) 100 mg capsule, Take 1 capsule (100 mg total) by mouth 2 (two) times a day, Disp: 10 capsule, Rfl: 0    ferrous sulfate 324 (65 Fe) mg, Take 1 tablet (324 mg total) by mouth 2 (two) times a day before meals, Disp: 60 tablet, Rfl: 0    folic acid (FOLVITE) 1 mg tablet, Take 1 tablet (1 mg total) by mouth daily (Patient not taking: Reported on 1/12/2022 ), Disp: 30 tablet, Rfl: 0    LORazepam (ATIVAN) 0 5 mg tablet, TAKE ONE TABLET BY MOUTH ONCE DAILY AT BEDTIME AS NEEDED FOR ANXIETY (Patient not taking: Reported on 1/18/2022), Disp: 20 tablet, Rfl: 0    Multiple Vitamin (MULTI-VITAMIN DAILY PO), , Disp: , Rfl:     pramipexole (MIRAPEX) 0 25 mg tablet, 1 1/2 tabs daily, Disp: 135 tablet, Rfl: 0    CONSTITUTIONAL:   Vitals:    03/08/22 1606   Weight: 84 8 kg (187 lb)       Specific Alerts:    Have you been seen by a [facility] in the last 3 years? YES    Are you pregnant or planning to become pregnant? No    Are you currently or planning to be nursing or breast feeding? No    Allergies   Allergen Reactions    Monistat [Tioconazole] Other (See Comments)     Severe vaginal burning    Benzoin Rash    Meloxicam Rash    Nickel Itching and Rash       May we call your Preferred Phone number to discuss your specific medical information?  YES    May we leave a detailed message that includes your specific medical information? YES    Have you traveled outside of the [facility] in the past 3 months? No    Do you currently have a pacemaker or defibrillator? No    Do you have any artificial heart valves, joints, plates, screws, rods, stents, pins, etc? YES   - If Yes, were any placed within the last 2 years? Dec 13, 2021     Do you require any medications prior to a surgical procedure? No     Are you taking any medications that cause you to bleed more easily ("blood thinners") No    Have you ever experienced a rapid heartbeat with epinephrine? No      Review of Systems:  Have you recently had or currently have any of the following?     · Fever or chills: No  · Night Sweats: No  · Headaches: No  · Weight Gain: No  · Weight Loss: No  · Blurry Vision: No  · Nausea: No  · Vomiting: No  · Diarrhea: No  · Blood in Stool: No  · Abdominal Pain: No  · Itchy Skin: No  · Painful Joints: YES  · Swollen Joints: No  · Muscle Pain: No  · Irregular Mole: No  · Sun Burn: No  · Dry Skin: YES  · Skin Color Changes: No  · Scar or Keloid: No  · Cold Sores/Fever Blisters: No  · Bacterial Infections/MRSA: No  · Anxiety: YES  · Depression: YES  · Suicidal or Homicidal Thoughts: No      PSYCH: Normal mood and affect  EYES: Normal conjunctiva  ENT: Normal lips and oral mucosa  CARDIOVASCULAR: No edema  RESPIRATORY: Normal respirations  HEME/LYMPH/IMMUNO:  No regional lymphadenopathy except as noted below in ASSESSMENT AND PLAN BY DIAGNOSIS    FULL ORGAN SYSTEM SKIN EXAM (SKIN)   Hair, Scalp, Ears, Face Normal except as noted below in Assessment   Neck, Cervical Chain Nodes Normal except as noted below in Assessment   Right Arm/Hand/Fingers Normal except as noted below in Assessment   Left Arm/Hand/Fingers Normal except as noted below in Assessment   Chest/Breasts/Axillae Viewed areas Normal except as noted below in Assessment   Abdomen, Umbilicus Normal except as noted below in Assessment   Back/Spine Normal except as noted below in Assessment   Groin/Genitalia/Buttocks Viewed areas Normal except as noted below in Assessment   Right Leg, Foot, Toes Normal except as noted below in Assessment   Left Leg, Foot, Toes Normal except as noted below in Assessment       HISTORY OF MELANOMA    Physical Exam:   Anatomic Location Affected:  Left upper arm    Morphological Description of Scar:  Well healed scar    Year Treated: 2018   Suspected Recurrence: no   Regional adenopathy: no    Additional History of Present Condition:  Patient is present for her routine yearly skin exam      Assessment and Plan:  Based on a thorough discussion of this condition and the management approach to it (including a comprehensive discussion of the known risks, side effects and potential benefits of treatment), the patient (family) agrees to implement the following specific plan:   Continue routine skin screenings    When outside we recommend using a wide brim hat, sunglasses, long sleeve and pants, sunscreen with SPF 41+ with reapplication every 2 hours, or SPF specific clothing     What happens at follow-up? The main purpose of follow-up is to detect recurrences early (metastatic melanoma), but it also offers an opportunity to diagnose a new primary melanoma at the first possible opportunity  A second invasive melanoma occurs in 5-10% of melanoma patients and a new melanoma in situ is diagnosed in more than 20% of melanoma patients  Our practice makes the following recommendations for follow-up for patients with invasive melanoma     At-least "monthly" self-skin examinations    Routine skin checks by a board certified dermatologist   Follow-up intervals are "every 3 months" within 2 years of a new melanoma diagnosis; "every 6 months" between 2-4 years of a new melanoma diagnosis; and "annually" after 4 years of a new melanoma diagnosis   Individual patient's needs should be considered before an appropriate follow-up is offered   Provide education and support to help the patient adjust to their illness    Follow-up appointments should include:   A check of the scar where the primary melanoma was removed   Checking the regional lymph nodes   A general skin examination   A full physical examination at least annually by your primary care physician    In those with more advanced primary disease, follow-up may include:   Blood tests   Imaging: ultrasound, X-ray, CT, MRI and PET scan  Most tests are not worthwhile for patients with stage 1 or 2 melanoma unless there are signs or symptoms of disease recurrence or metastasis  No tests are necessary for healthy patients who have remained well for five years or longer after removal of their melanoma  What is the outlook for patients with melanoma?  Melanoma in situ is cured by excision because it has no potential to spread around the body   The risk of spread and ultimate death from invasive melanoma depends on several factors, but the main one is the Breslow thickness of the melanoma at the time it was surgically removed   Metastases are rare for melanomas < 0 75 mm and the risk for tumours 0 75-1 mm thick is about 5%  The risk steadily increases with thickness so that melanomas > 4 mm have a risk of metastasis of about 40%  Melanoma is a potentially serious type of skin cancer, in which there is uncontrolled growth of melanocytes (pigment cells)  Melanoma is sometimes called malignant melanoma  Normal melanocytes are found in the basal layer of the epidermis (the outer layer of skin)  Melanocytes produce a protein called melanin, which protects skin cells by absorbing ultraviolet (UV) radiation  Melanocytes are found in equal numbers in black and white skin, but melanocytes in black skin produce much more melanin  People with dark brown or black skin are very much less likely to be damaged by UV radiation than those with white skin      CHERRY ANGIOMAS    Physical Exam:   Anatomic Location Affected:  Trunk and extremities   Morphological Description:  Scattered cherry red, 1-4 mm papules   Pertinent Positives:   Pertinent Negatives: Additional History of Present Condition:  Discovered during skin exam     Assessment and Plan:  Based on a thorough discussion of this condition and the management approach to it (including a comprehensive discussion of the known risks, side effects and potential benefits of treatment), the patient (family) agrees to implement the following specific plan:   Reassured benign     Assessment and Plan:    Cherry angioma, also known as Josselin Bologna spots, are benign vascular skin lesions  A "cherry angioma" is a firm red, blue or purple papule, 0 1-1 cm in diameter  When thrombosed, they can appear black in colour until evaluated with a dermatoscope when the red or purple colour is more easily seen  Cherry angioma may develop on any part of the body but most often appear on the scalp, face, lips and trunk  An angioma is due to proliferating endothelial cells; these are the cells that line the inside of a blood vessel  Angiomas can arise in early life or later in life; the most common type of angioma is a cherry angioma  Cherry angiomas are very common in males and females of any age or race  They are more noticeable in white skin than in skin of colour  They markedly increase in number from about the age of 36  There may be a family history of similar lesions  Eruptive cherry angiomas have been rarely reported to be associated with internal malignancy  The cause of angiomas is unknown  Genetic analysis of cherry angiomas has shown that they frequently carry specific somatic missense mutations in the GNAQ and GNA11 (Q209H) genes, which are involved in other vascular and melanocytic proliferations  Cherry angioma is usually diagnosed clinically and no investigations are necessary for the majority of lesions   It has a characteristic red-clod or lobular pattern on dermatoscopy (called lacunar pattern using conventional pattern analysis)  When there is uncertainty about the diagnosis, a biopsy may be performed  The angioma is composed of venules in a thickened papillary dermis  Collagen bundles may be prominent between the lobules  Cherry angiomas are harmless, so they do not usually have to be treated  Occasionally, they are removed to exclude a malignant skin lesion such as a nodular melanoma or because they are irritated or bleeding (and a subsequent risk for infection)  To decrease friction over the lesions, we recommend Neutrogena Daily Defense SPF 50+ at least 3 times a day  MELANOCYTIC NEVI ("Moles")    Physical Exam:   Anatomic Location Affected:   Mostly on sun-exposed areas of the trunk and extremities   Morphological Description:  Scattered, 1-4mm round to ovoid, symmetrical-appearing, even bordered, skin colored to dark brown macules/papules, mostly in sun-exposed areas   Pertinent Positives:   Pertinent Negatives: Additional History of Present Condition:  Discovered upon skin exam     Assessment and Plan:  Based on a thorough discussion of this condition and the management approach to it (including a comprehensive discussion of the known risks, side effects and potential benefits of treatment), the patient (family) agrees to implement the following specific plan:   Monitor for changes   When outside we recommend using a wide brim hat, sunglasses, long sleeve and pants, sunscreen with SPF 30+ with reapplication every 2 hours, or SPF specific clothing    Recommend yearly skin exams or sooner if anything of concern develops  Melanocytic Nevi  Melanocytic nevi ("moles") are tan or brown, raised or flat areas of the skin which have an increased number of melanocytes  Melanocytes are the cells in our body which make pigment and account for skin color      Some moles are present at birth (I e , "congenital nevi"), while others come up later in life (i e , "acquired nevi")  The sun can stimulate the body to make more moles  Sunburns are not the only thing that triggers more moles  Chronic sun exposure can do it too  Clinically distinguishing a healthy mole from melanoma may be difficult, even for experienced dermatologists  The "ABCDE's" of moles have been suggested as a means of helping to alert a person to a suspicious mole and the possible increased risk of melanoma  The suggestions for raising alert are as follows:    Asymmetry: Healthy moles tend to be symmetric, while melanomas are often asymmetric  Asymmetry means if you draw a line through the mole, the two halves do not match in color, size, shape, or surface texture  Asymmetry can be a result of rapid enlargement of a mole, the development of a raised area on a previously flat lesion, scaling, ulceration, bleeding or scabbing within the mole  Any mole that starts to demonstrate "asymmetry" should be examined promptly by a board certified dermatologist      Border: Healthy moles tend to have discrete, even borders  The border of a melanoma often blends into the normal skin and does not sharply delineate the mole from normal skin  Any mole that starts to demonstrate "uneven borders" should be examined promptly by a board certified dermatologist      Color: Healthy moles tend to be one color throughout  Melanomas tend to be made up of different colors ranging from dark black, blue, white, or red  Any mole that demonstrates a color change should be examined promptly by a board certified dermatologist      Diameter: Healthy moles tend to be smaller than 0 6 cm in size; an exception are "congenital nevi" that can be larger  Melanomas tend to grow and can often be greater than 0 6 cm (1/4 of an inch, or the size of a pencil eraser)  This is only a guideline, and many normal moles may be larger than 0 6 cm without being unhealthy    Any mole that starts to change in size (small to bigger or bigger to smaller) should be examined promptly by a board certified dermatologist      Evolving: Healthy moles tend to "stay the same "  Melanomas may often show signs of change or evolution such as a change in size, shape, color, or elevation  Any mole that starts to itch, bleed, crust, burn, hurt, or ulcerate or demonstrate a change or evolution should be examined promptly by a board certified dermatologist       Dysplastic Nevi  Dysplastic moles are moles that fit the ABCDE rules of melanoma but are not identified as melanomas when examined under the microscope  They may indicate an increased risk of melanoma in that person  If there is a family history of melanoma, most experts agree that the person may be at an increased risk for developing a melanoma  Experts still do not agree on what dysplastic moles mean in patients without a personal or family history of melanoma  Dysplastic moles are usually larger than common moles and have different colors within it with irregular borders  The appearance can be very similar to a melanoma  Biopsies of dysplastic moles may show abnormalities which are different from a regular mole  Melanoma  Malignant melanoma is a type of skin cancer that can be deadly if it spreads throughout the body  The incidence of melanoma in the [place] is growing faster than any other cancer  Melanoma usually grows near the surface of the skin for a period of time, and then begins to grow deeper into the skin  Once it grows deeper into the skin, the risk of spread to other organs greatly increases  Therefore, early detection and removal of a malignant melanoma may result in a better chance at a complete cure; removal after the tumor has spread may not be as effective, leading to worse clinical outcomes such as death  The true rate of nevus transformation into a melanoma is unknown   It has been estimated that the lifetime risk for any acquired melanocytic nevus on any 21year-old individual transforming into melanoma by age [de-identified] is 0 03% (1 in 3,164) for men and 0 009% (1 in 10,800) for women  The appearance of a "new mole" remains one of the most reliable methods for identifying a malignant melanoma  Occasionally, melanomas appear as rapidly growing, blue-black, dome-shaped bumps within a previous mole or previous area of normal skin  Other times, melanomas are suspected when a mole suddenly appears or changes  Itching, burning, or pain in a pigmented lesion should increase suspicion, but most patients with early melanoma have no skin discomfort whatsoever  Melanoma can occur anywhere on the skin, including areas that are difficult for self-examination  Many melanomas are first noticed by other family members  Suspicious-looking moles may be removed for microscopic examination  You may be able to prevent death from melanoma by doing two simple things:    1  Try to avoid unnecessary sun exposure and protect your skin when it is exposed to the sun  People who live near the equator, people who have intermittent exposures to large amounts of sun, and people who have had sunburns in childhood or adolescence have an increased risk for melanoma  Sun sense and vigilant sun protection may be keys to helping to prevent melanoma  We recommend wearing UPF-rated sun protective clothing and sunglasses whenever possible and applying a moisturizer-sunscreen combination product (SPF 50+) such as Neutrogena Daily Defense to sun exposed areas of skin at least three times a day  2  Have your moles regularly examined by a board certified dermatologist AND by yourself or a family member/friend at home    We recommend that you have your moles examined at least once a year by a board certified dermatologist   Use your birthday as an annual reminder to have your "Birthday Suit" (I e , your skin) examined; it is a nice birthday gift to yourself to know that your skin is healthy appearing! Additionally, at-home self examinations may be helpful for detecting a possible melanoma  Use the ABCDEs we discussed and check your moles once a month at home  NEOPLASM OF UNCERTAIN BEHAVIOR OF SKIN    Physical Exam:   (Anatomic Location); (Size and Morphological Description); (Differential Diagnosis):  o Right shoulder; 0 6 cm crusted lucid macule; Diff Dx; Basal Cell Carcinoma versus abrasion   Pertinent Positives:    Pertinent Negatives: Additional History of Present Condition:  Discovered upon exam     Assessment and Plan:   I have discussed with the patient that a sample of skin via a "skin biopsy would be potentially helpful to further make a specific diagnosis under the microscope   Based on a thorough discussion of this condition and the management approach to it (including a comprehensive discussion of the known risks, side effects and potential benefits of treatment), the patient (family) agrees to implement the following specific plan:    o Procedure:  Skin Biopsy  After a thorough discussion of treatment options and risk/benefits/alternatives (including but not limited to local pain, scarring, dyspigmentation, blistering, possible superinfection, and inability to confirm a diagnosis via histopathology), verbal and written consent were obtained and portion of the rash was biopsied for tissue sample  See below for consent that was obtained from patient and subsequent Procedure Note  PROCEDURE SHAVE BIOPSY NOTE:     Performing Physician: Dr Gomez   Anatomic Location; Clinical Description with size (cm); Pre-Op Diagnosis:      Right shoulder; 0 6 cm crusted lucid macule; Diff Dx; Basal Cell Carcinoma versus abrasion      Post-op diagnosis: Same      Local anesthesia: 1% xylocaine with epi       Topical anesthesia: None     Hemostasis: Aluminum chloride       After obtaining informed consent  at which time there was a discussion about the purpose of biopsy  and low risks of infection and bleeding  The area was prepped and draped in the usual fashion  Anesthesia was obtained with 1% lidocaine with epinephrine  A shave biopsy to an appropriate sampling depth was obtained with a sterile blade (such as a 15-blade or DermaBlade)  The resulting wound was covered with surgical ointment and bandaged appropriately  The patient tolerated the procedure well without complications and was without signs of functional compromise  Specimen has been sent for review by Dermatopathology  Standard post-procedure care has been explained and has been included in written form within the patient's copy of Informed Consent  INFORMED CONSENT DISCUSSION AND POST-OPERATIVE INSTRUCTIONS FOR PATIENT    I   RATIONALE FOR PROCEDURE  I understand that a skin biopsy allows the Dermatologist to test a lesion or rash under the microscope to obtain a diagnosis  It usually involves numbing the area with numbing medication and removing a small piece of skin; sometimes the area will be closed with sutures  In this specific procedure, sutures are not usually needed  If any sutures are placed, then they are usually need to be removed in 2 weeks or less  I understand that my Dermatologist recommends that a skin "shave" biopsy be performed today  A local anesthetic, similar to the kind that a dentist uses when filling a cavity, will be injected with a very small needle into the skin area to be sampled  The injected skin and tissue underneath "will go to sleep and become numb so no pain should be felt afterwards  An instrument shaped like a tiny "razor blade" (shave biopsy instrument) will be used to cut a small piece of tissue and skin from the area so that a sample of tissue can be taken and examined more closely under the microscope    A slight amount of bleeding will occur, but it will be stopped with direct pressure and a pressure bandage and any other appropriate methods  I understands that a scar will form where the wound was created  Surgical ointment will be applied to help protect the wound  Sutures are not usually needed  II   RISKS AND POTENTIAL COMPLICATIONS   I understand the risks and potential complications of a skin biopsy include but are not limited to the following:   Bleeding   Infection   Pain   Scar/keloid   Skin discoloration   Incomplete Removal   Recurrence   Nerve Damage/Numbness/Loss of Function   Allergic Reaction to Anesthesia   Biopsies are diagnostic procedures and based on findings additional treatment or evaluation may be required   Loss or destruction of specimen resulting in no additional findings    My Dermatologist has explained to me the nature of the condition, the nature of the procedure, and the benefits to be reasonably expected compared with alternative approaches  My Dermatologist has discussed the likelihood of major risks or complications of this procedure including the specific risks listed above, such as bleeding, infection, and scarring/keloid  I understand that a scar is expected after this procedure  I understand that my physician cannot predict if the scar will form a "keloid," which extends beyond the borders of the wound that is created  A keloid is a thick, painful, and bumpy scar  A keloid can be difficult to treat, as it does not always respond well to therapy, which includes injecting cortisone directly into the keloid every few weeks  While this usually reduces the pain and size of the scar, it does not eliminate it  I understand that photographs may be taken before and after the procedure  These will be maintained as part of the medical providers confidential records and may not be made available to me    I further authorize the medical provider to use the photographs for teaching purposes or to illustrate scientific papers, books, or lectures if in his/her judgment, medical research, education, or science may benefit from its use  I have had an opportunity to fully inquire about the risks and benefits of this procedure and its alternatives  I have been given ample time and opportunity to ask questions and to seek a second opinion if I wished to do so  I acknowledge that there have specifically been no guarantees as to the cosmetic results from the procedure  I am aware that with any procedure there is always the possibility of an unexpected complication  III  POST-PROCEDURAL CARE (WHAT YOU WILL NEED TO DO "AFTER THE BIOPSY" TO OPTIMIZE HEALING)     Keep the area clean and dry  Try NOT to remove the bandage or get it wet for the first 24 hours   Gently clean the area and apply surgical ointment (such as Vaseline petrolatum ointment, which is available "over the counter" and not a prescription) to the biopsy site for up to 2 weeks straight  This acts to protect the wound from the outside world   Sutures are not usually placed in this procedure  If any sutures were placed, return for suture removal as instructed (generally 1 week for the face, 2 weeks for the body)   Take Acetaminophen (Tylenol) for discomfort, if no contraindications  Ibuprofen or aspirin could make bleeding worse   Call our office immediately for signs of infection: fever, chills, increased redness, warmth, tenderness, discomfort/pain, or pus or foul smell coming from the wound  WHAT TO DO IF THERE IS ANY BLEEDING? If a small amount of bleeding is noticed, place a clean cloth over the area and apply firm pressure for ten minutes  Check the wound after 10 minutes of direct pressure  If bleeding persists, try one more time for an additional 10 minutes of direct pressure on the area    If the bleeding becomes heavier or does not stop after the second attempt, or if you have any other questions about this procedure, then please call your [facility] by calling [phone] (SKIN)  I hereby acknowledge that I have reviewed and verified the site with my Dermatologist and have requested and authorized my Dermatologist to proceed with the procedure          Scribe Attestation    I,:  Tiffany Matute MA am acting as a scribe while in the presence of the attending physician :       I,:  Gian Seals MD personally performed the services described in this documentation    as scribed in my presence :

## 2022-03-09 ENCOUNTER — OFFICE VISIT (OUTPATIENT)
Dept: OBGYN CLINIC | Facility: CLINIC | Age: 62
End: 2022-03-09

## 2022-03-09 ENCOUNTER — HOSPITAL ENCOUNTER (OUTPATIENT)
Dept: RADIOLOGY | Facility: HOSPITAL | Age: 62
Discharge: HOME | End: 2022-03-09
Attending: ORTHOPAEDIC SURGERY
Payer: COMMERCIAL

## 2022-03-09 VITALS — WEIGHT: 187 LBS | HEIGHT: 64 IN | BODY MASS INDEX: 31.92 KG/M2

## 2022-03-09 DIAGNOSIS — Z96.642 STATUS POST TOTAL REPLACEMENT OF LEFT HIP: ICD-10-CM

## 2022-03-09 DIAGNOSIS — Z96.642 STATUS POST TOTAL REPLACEMENT OF LEFT HIP: Primary | ICD-10-CM

## 2022-03-09 PROCEDURE — 99024 POSTOP FOLLOW-UP VISIT: CPT | Performed by: ORTHOPAEDIC SURGERY

## 2022-03-09 PROCEDURE — 73502 X-RAY EXAM HIP UNI 2-3 VIEWS: CPT

## 2022-03-09 RX ORDER — AMOXICILLIN 500 MG/1
CAPSULE ORAL
Qty: 4 CAPSULE | Refills: 2 | Status: SHIPPED | OUTPATIENT
Start: 2022-03-09 | End: 2022-03-10

## 2022-03-09 NOTE — PROGRESS NOTES
64 y o female presents for 2 and half months postoperative visit status post left  Anterior JOSEPH  Patient states she is doing very well status post left anterior total hip arthroplasty  She is very happy regarding outcome of the procedure  States he is back to most all activities including using left ankle as well as doing activities in tennis  She denies any changes numbness tingling left lower extremity      Review of Systems  Review of systems negative unless otherwise specified in HPI    Past Medical History  Past Medical History:   Diagnosis Date    Anxiety     Arthritis     Cancer ([facility] [address] [facility]     Depression     [facility]anoma ([facility] ) 2018    Left upper arm    PONV (postoperative nausea and vomiting)        Past Surgical History  Past Surgical History:   Procedure Laterality Date     SECTION       SECTION      FL INJECTION LEFT HIP (ARTHROGRAM)  10/24/2018    FL INJECTION LEFT HIP (NON ARTHROGRAM)  2018    FL INJECTION LEFT HIP (NON ARTHROGRAM)  2019    FL INJECTION LEFT HIP (NON ARTHROGRAM)  2019    FL INJECTION LEFT HIP (NON ARTHROGRAM)  2019    FL INJECTION LEFT HIP (NON ARTHROGRAM)  2020    FL INJECTION LEFT HIP (NON ARTHROGRAM)  2020    FL INJECTION LEFT HIP (NON ARTHROGRAM)  2021    KNEE ARTHROSCOPY      NE TOTAL HIP ARTHROPLASTY Left 2021    Procedure: ARTHROPLASTY HIP TOTAL ANTERIOR;  Surgeon: Cristi Villa MD;  [facility];  Service: Orthopedics    [address] [place] [address] SKIN BIOPSY  2022    WISDOM TOOTH EXTRACTION         Current Medications  Current Outpatient Medications on File Prior to Visit   Medication Sig Dispense Refill    citalopram (CeleXA) 40 mg tablet Take 1 tablet (40 mg total) by mouth daily 90 tablet 3    Multiple Vitamin (MULTI-VITAMIN DAILY PO)       pramipexole (MIRAPEX) 0 25 mg tablet 1 1/2 tabs daily 135 tablet 0    ascorbic acid (VITAMIN C) 500 MG tablet Take 1 tablet (500 mg total) by mouth daily (Patient not taking: Reported on 3/9/2022 ) 60 tablet 0    aspirin (ECOTRIN LOW STRENGTH) 81 mg EC tablet Take 1 tablet (81 mg total) by mouth 2 (two) times a day Please do not start taking until after total joint arthroplasty 70 tablet 0    docusate sodium (COLACE) 100 mg capsule Take 1 capsule (100 mg total) by mouth 2 (two) times a day (Patient not taking: Reported on 3/9/2022 ) 10 capsule 0    ferrous sulfate 324 (65 Fe) mg Take 1 tablet (324 mg total) by mouth 2 (two) times a day before meals (Patient not taking: Reported on 3/9/2022 ) 60 tablet 0    folic acid (FOLVITE) 1 mg tablet Take 1 tablet (1 mg total) by mouth daily (Patient not taking: Reported on 1/12/2022 ) 30 tablet 0    LORazepam (ATIVAN) 0 5 mg tablet TAKE ONE TABLET BY MOUTH ONCE DAILY AT BEDTIME AS NEEDED FOR ANXIETY (Patient not taking: Reported on 1/18/2022) 20 tablet 0     No current facility-administered medications on file prior to visit         Recent Labs (HCT,HGB,PT,INR,ESR,CRP,GLU,HgA1C)  0   Lab Value Date/Time    HCT 35 9 01/12/2022 1620    HGB 11 5 01/12/2022 1620    WBC 8 47 01/12/2022 1620    INR 0 91 11/21/2021 0920    HGBA1C 5 4 11/21/2021 0920         Physical exam  · General: Awake, Alert, Oriented  · Eyes: Pupils equal, round and reactive to light  · Heart: regular rate and rhythm  · Lungs: No audible wheezing    left Lower extremity  · Examination patient's left lower extremity incisions well healed hip flexion greater than 90° no pain with internal-external rotation hip flexion adduction abduction strength 5/5 active straight leg raise without pain active  ankle dorsi plantar flexion sensation intact distal pulses present    Imaging  X-rays left hip reviewed x-rays reveal well reduced left hip arthritis without evidence of loosening compared to previous films in excellent alignment    Assessment:   Status post 2 and half months left anterior hip arthroplasty doing well Plan:  Weightbearing as tolerated left lower extremity   Activities as tolerated   Range of motion stretching strengthening exercises at home at this time   Over-the-counter pain medication as needed   Case discussed with Dr Jennifer Lino in 9 months time repeat x-rays left hip

## 2022-03-10 ENCOUNTER — EVALUATION (OUTPATIENT)
Dept: PHYSICAL THERAPY | Facility: CLINIC | Age: 62
End: 2022-03-10
Payer: COMMERCIAL

## 2022-03-10 PROCEDURE — 97164 PT RE-EVAL EST PLAN CARE: CPT | Performed by: PHYSICAL THERAPIST

## 2022-03-10 NOTE — PROGRESS NOTES
PT Discharge    Today's date: 3/10/2022  Patient name: Gina Tavera  : 1960  MRN: [id number]  Referring provider: Myrtle Dc PA-C  Dx: No diagnosis found  Assessment  Assessment details: All formal PT goals have been achieved  Patient to resume full independent fitness program   Functional limitations: NoneUnderstanding of Dx/Px/POC: good   Prognosis: good    Goals  Short Term goals - 4 weeks  1  Patient will be independent and compliant with a HEP  MET  2  Patient will report a 50% decrease in pain complaints  MET    Long Term goals - 8 weeks  1  Patient will report elimination of pain complaints  MET  2  Patient will return to all work related activities without restriction  MET  3  Patient will return to all recreational activities without restriction   MET    Plan  Planned therapy interventions: home exercise program  Treatment plan discussed with: patient        Subjective Evaluation    History of Present Illness  Mechanism of injury: L anterior JOSEPH          Not a recurrent problem   Pain  Current pain ratin  At best pain ratin  At worst pain ratin  Progression: resolved    Treatments  Previous treatment: physical therapy  Patient Goals  Patient goals for therapy: decreased pain, increased motion, increased strength, independence with ADLs/IADLs, return to sport/leisure activities and return to work          Objective     Active Range of Motion   Left Hip   Normal active range of motion    Right Hip   Normal active range of motion    Strength/Myotome Testing     Left Hip   Normal muscle strength    Right Hip   Normal muscle strength

## 2022-03-11 NOTE — RESULT ENCOUNTER NOTE
I have reviewed the notes, assessments, and/or procedures performed by [facility], I concur with her/his documentation of Art Valdez

## 2022-03-15 ENCOUNTER — TELEPHONE (OUTPATIENT)
Dept: DERMATOLOGY | Facility: CLINIC | Age: 62
End: 2022-03-15

## 2022-03-15 NOTE — TELEPHONE ENCOUNTER
Pt is scheduled on 4/6 at 2:30pm with you per [address] for an excision on her back-basil cell  This was the only available appt open

## 2022-03-15 NOTE — TELEPHONE ENCOUNTER
Ret'd call to Pt and LVM advising to call us back to schedule an appt for the excision per Dr Neal Murillo

## 2022-03-20 ENCOUNTER — OFFICE VISIT (OUTPATIENT)
Dept: URGENT CARE | Age: 62
End: 2022-03-20
Payer: COMMERCIAL

## 2022-03-20 VITALS
OXYGEN SATURATION: 98 % | SYSTOLIC BLOOD PRESSURE: 132 MMHG | DIASTOLIC BLOOD PRESSURE: 61 MMHG | HEART RATE: 79 BPM | TEMPERATURE: 97.3 F | RESPIRATION RATE: 18 BRPM
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
## 2022-03-20 PROCEDURE — S9083 URGENT CARE CENTER GLOBAL: HCPCS

## 2022-03-20 PROCEDURE — G0382 LEV 3 HOSP TYPE B ED VISIT: HCPCS

## 2022-03-20 RX ORDER — AMOXICILLIN AND CLAVULANATE POTASSIUM 875; 125 MG/1; MG/1
1 TABLET, FILM COATED ORAL EVERY 12 HOURS SCHEDULED
Qty: 20 TABLET | Refills: 0 | Status: SHIPPED | OUTPATIENT
Start: 2022-03-20 | End: 2022-03-30

## 2022-03-20 NOTE — PROGRESS NOTES
[facility]        NAME: Maryse Tillman is a 64 y o  female  : 1960    MRN: [id number]  DATE: 2022  TIME: 5:13 PM    Assessment and Plan   Dog bite, initial encounter [W54  0XXA]  1  Dog bite, initial encounter  amoxicillin-clavulanate (AUGMENTIN) 875-125 mg per tablet     Patient to follow-up in the emergency department if she finds out that the dog is not vaccinated  Patient of take antibiotics as discussed  All questions and concerns were addressed at this time  Patient Instructions     Please take antibiotics as discussed  Follow up with PCP in 3-5 days  Proceed to  ER if symptoms worsen  Chief Complaint     Chief Complaint   Patient presents with    Dog Bite     right lower leg today @1530, tetnus -2017         History of Present Illness       Patient presenting for evaluation of dog bite  Patient states at 3:30 today, her neighbor's dog bit her  She is unsure if the dog is up-to-date on vaccines, but she states that she will contact her neighbors and find out  Patient states she is up-to-date on her vaccine for tetanus  She denies any numbness or tingling in her right lower extremity at this time  She denies any fevers, chills, drainage or redness at the bite site  Review of Systems   Review of Systems   Constitutional: Negative for chills and fever  HENT: Negative for ear pain and sore throat  Eyes: Negative for pain and visual disturbance  Respiratory: Negative for cough and shortness of breath  Cardiovascular: Negative for chest pain and palpitations  Gastrointestinal: Negative for abdominal pain and vomiting  Genitourinary: Negative for dysuria and hematuria  Musculoskeletal: Negative for arthralgias and back pain  Skin: Positive for wound  Negative for color change and rash  Neurological: Negative for syncope and headaches  All other systems reviewed and are negative          Current Medications       Current Outpatient Medications:    amoxicillin-clavulanate (AUGMENTIN) 875-125 mg per tablet, Take 1 tablet by mouth every 12 (twelve) hours for 10 days, Disp: 20 tablet, Rfl: 0    ascorbic acid (VITAMIN C) 500 MG tablet, Take 1 tablet (500 mg total) by mouth daily (Patient not taking: Reported on 3/9/2022 ), Disp: 60 tablet, Rfl: 0    aspirin (ECOTRIN LOW STRENGTH) 81 mg EC tablet, Take 1 tablet (81 mg total) by mouth 2 (two) times a day Please do not start taking until after total joint arthroplasty, Disp: 70 tablet, Rfl: 0    citalopram (CeleXA) 40 mg tablet, Take 1 tablet (40 mg total) by mouth daily, Disp: 90 tablet, Rfl: 3    docusate sodium (COLACE) 100 mg capsule, Take 1 capsule (100 mg total) by mouth 2 (two) times a day (Patient not taking: Reported on 3/9/2022 ), Disp: 10 capsule, Rfl: 0    ferrous sulfate 324 (65 Fe) mg, Take 1 tablet (324 mg total) by mouth 2 (two) times a day before meals (Patient not taking: Reported on 3/9/2022 ), Disp: 60 tablet, Rfl: 0    folic acid (FOLVITE) 1 mg tablet, Take 1 tablet (1 mg total) by mouth daily (Patient not taking: Reported on 1/12/2022 ), Disp: 30 tablet, Rfl: 0    LORazepam (ATIVAN) 0 5 mg tablet, TAKE ONE TABLET BY MOUTH ONCE DAILY AT BEDTIME AS NEEDED FOR ANXIETY (Patient not taking: Reported on 1/18/2022), Disp: 20 tablet, Rfl: 0    Multiple Vitamin (MULTI-VITAMIN DAILY PO), , Disp: , Rfl:     pramipexole (MIRAPEX) 0 25 mg tablet, 1 1/2 tabs daily, Disp: 135 tablet, Rfl: 0    Current Allergies     Allergies as of 03/20/2022 - Reviewed 03/20/2022   Allergen Reaction Noted    Monistat [tioconazole] Other (See Comments) 07/05/2018    Benzoin Rash 06/17/2015    Meloxicam Rash 09/20/2018    Nickel Itching and Rash 08/10/2021            The following portions of the patient's history were reviewed and updated as appropriate: allergies, current medications, past family history, past medical history, past social history, past surgical history and problem list      Past Medical History: Diagnosis Date    Anxiety     Arthritis     [facility] ([facility] [address] )     Depression     Melanoma ([facility] [address] ) 2018    Left upper arm    PONV (postoperative nausea and vomiting)        Past Surgical History:   Procedure Laterality Date     SECTION       SECTION      FL INJECTION LEFT HIP (ARTHROGRAM)  10/24/2018    FL INJECTION LEFT HIP (NON ARTHROGRAM)  2018    FL INJECTION LEFT HIP (NON ARTHROGRAM)  2019    FL INJECTION LEFT HIP (NON ARTHROGRAM)  2019    FL INJECTION LEFT HIP (NON ARTHROGRAM)  2019    FL INJECTION LEFT HIP (NON ARTHROGRAM)  2020    FL INJECTION LEFT HIP (NON ARTHROGRAM)  2020    FL INJECTION LEFT HIP (NON ARTHROGRAM)  2021    KNEE ARTHROSCOPY      SC TOTAL HIP ARTHROPLASTY Left 2021    Procedure: ARTHROPLASTY HIP TOTAL ANTERIOR;  Surgeon: Casandra Nagy MD;  [facility];  Service: Orthopedics    ROTATOR CUFF REPAIR      SHOULDER SURGERY      SKIN BIOPSY  2022    WISDOM TOOTH EXTRACTION         Family History   Problem Relation Age of Onset    Arthritis Mother     Hypertension Mother     Scoliosis Mother     Hyperlipidemia Mother     Heart attack Mother     Arthritis Father     Heart disease Father         CARDIAC DISORDER    Heart attack Father     Ovarian cancer Maternal Aunt     Diabetes Sister         borderline    No Known Problems Brother     No Known Problems Son     Alzheimer's disease Maternal Grandmother     Stroke Maternal Grandfather     No Known Problems Paternal Grandmother     No Known Problems Paternal Grandfather     No Known Problems Sister     No Known Problems Brother     No Known Problems Son     No Known Problems Son     Breast cancer Maternal Aunt 79    Heart attack Maternal Aunt     Lung cancer Paternal Aunt          Medications have been verified          Objective   /61   Pulse 79   Temp (!) 97 3 °F (36 3 °C)   Resp 18   SpO2 98%        Physical Exam     Physical Exam  Vitals and nursing note reviewed  Constitutional:       General: She is not in acute distress  Appearance: Normal appearance  She is not ill-appearing  HENT:      Head: Normocephalic and atraumatic  Right Ear: Tympanic membrane normal       Left Ear: Tympanic membrane normal       Nose: No congestion or rhinorrhea  Mouth/Throat:      Mouth: Mucous membranes are moist       Pharynx: Oropharynx is clear  No oropharyngeal exudate or posterior oropharyngeal erythema  Eyes:      Extraocular Movements: Extraocular movements intact  Conjunctiva/sclera: Conjunctivae normal       Pupils: Pupils are equal, round, and reactive to light  Cardiovascular:      Rate and Rhythm: Normal rate and regular rhythm  Pulses: Normal pulses  Heart sounds: Normal heart sounds  No murmur heard  No friction rub  No gallop  Pulmonary:      Effort: No respiratory distress  Breath sounds: Normal breath sounds  No wheezing, rhonchi or rales  Abdominal:      General: Bowel sounds are normal       Palpations: Abdomen is soft  Tenderness: There is no abdominal tenderness  Musculoskeletal:         General: Tenderness present  Normal range of motion  Cervical back: Normal range of motion and neck supple  Skin:     General: Skin is warm and dry  Capillary Refill: Capillary refill takes less than 2 seconds  Findings: Bruising present  No erythema  Neurological:      General: No focal deficit present  Mental Status: She is alert and oriented to person, place, and time     Psychiatric:         Mood and Affect: Mood normal          Behavior: Behavior normal

## 2022-03-20 NOTE — PATIENT INSTRUCTIONS
1  Drink plenty fluids  2   Take probiotics [i e  Yogurt, Acidophilus, Florastor (liquid)] daily  3   Over-the-counter medications as needed for symptomatic care  4    Advance activities as tolerated  5    Follow-up with your primary care physician in 3-4 days  6   Go to emergency room if symptoms are worsening  7   Use a humidifier at bedtime  Animal Bite   AMBULATORY CARE:   Animal bite  injuries range from shallow cuts to deep, life-threatening wounds  Animal bites occur more often on the hands, arms, legs, and face  Bites from dogs and cats are the most common injuries  Common symptoms include the following:   · Cut or punctured skin     · Skin torn from your body    · Swollen or bruised skin, even if the skin is not broken    Seek care immediately if:   · You have a fever  · Your wound is red, swollen, and draining pus  · You see red streaks on the skin around the wound  · You can no longer move the bitten area  · Your heartbeat and breathing are much faster than usual     · You feel dizzy and confused  Contact your healthcare provider if:   · Your pain does not get better, even after you take pain medicine  · You have nightmares or flashbacks about the animal bite  · You have questions or concerns about your condition or care  Treatment for an animal bite  may include any of the following:  · Irrigation and debridement  may be needed to clean out your wound  Dead, damaged, or infected tissue may be cut away to help your wound heal     · Medicines:      ? Antibiotics  prevent or treat a bacterial infection  ? Prescription pain medicine  may be given  Ask how to take this medicine safely  ? A tetanus vaccine  may be needed to prevent tetanus  Tetanus is a life-threatening bacterial infection that affects the nerves and muscles  The bacteria can be spread through animal bites  ? A rabies vaccine  may be needed to prevent rabies   Rabies is a life-threatening viral infection  The virus can be spread through animal bites  · Stitches  may be needed if your wound is large and not infected  · Surgery  may be needed to repair deep injuries or severe wounds  Manage your symptoms:   · Apply antibiotic ointment as directed  This helps prevent infection in minor skin wounds  Antibiotic ointment is available without a prescription  · Keep the wound clean and covered  Wash the wound every day with soap and water or germ-killing cleanser  Ask what kinds of bandages to use  · Apply ice to your wound  Ice helps prevent tissue damage and decreases swelling and pain  Use an ice pack, or put crushed ice in a plastic bag  Cover it with a towel  Apply ice on your wound for 15 to 20 minutes every hour or as directed  · Elevate your wound  Raise your wound above the level of your heart as often as you can  This will help decrease swelling and pain  Prop your wound on pillows or blankets to keep it elevated comfortably  Prevent another animal bite:   · Learn to recognize the signs of a scared pet  Avoid quick, sudden movements  · Do not step between animals that are fighting  · Do not leave a pet alone with a young child  · Do not disturb an animal while it eats, sleeps, or cares for its young  · Do not approach an animal you do not know, especially one that is tied up or caged  · Stay away from animals that seem sick or act strangely  · Do not feed or capture wild animals  Follow up with your doctor as directed:  Write down your questions so you remember to ask them during your visits  © Copyright [employer] 2022 Information is for End User's use only and may not be sold, redistributed or otherwise used for commercial purposes  All illustrations and images included in CareNotes® are the copyrighted property of [employer]  or [employer]   The above information is an  only   It is not intended as medical advice for individual conditions or treatments  Talk to your doctor, nurse or pharmacist before following any medical regimen to see if it is safe and effective for you

## 2022-03-23 PROBLEM — Z01.419 ENCOUNTER FOR ANNUAL ROUTINE GYNECOLOGICAL EXAMINATION: Status: ACTIVE | Noted: 2022-03-23

## 2022-03-23 PROBLEM — Z12.31 SCREENING MAMMOGRAM, ENCOUNTER FOR: Status: ACTIVE | Noted: 2022-03-23

## 2022-03-23 NOTE — PROGRESS NOTES
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 - no recurrences              RTO 1 yr  SBA monthly  3 D Mammo  Colonoscopy q 3 yrs- 5/17 referral given '21 and now- due  Ca 1,000 mg/d with Vit D - does  Exercise 3/wk - tennis and yoga, walking  old records- reviewed 5/2/17- normal BMD '16- repeat 65       Diagnoses and all orders for this visit:    Encounter for annual routine gynecological examination    Screening mammogram, encounter for  -     Mammo screening bilateral w 3d & cad; Future    Screening for colorectal cancer  -     Ambulatory Referral to Gastroenterology; Future              Subjective:        Patient ID: Karen Nieto is a 64 y o  female  [place] presents for a yearly evaluation  She has no complaints  She denies any vaginal bleeding  She is not in a relationship  Earlier in the month she was bit on the right calf by a dog  In December she had her left hip replaced and is doing wonderful  She has had no relapses with alcohol  She has not use the lorazepam       The following portions of the patient's history were reviewed and updated as appropriate: She  has a past medical history of Anxiety, Arthritis, Cancer ([facility] [address] ), Depression, Melanoma ([facility] [address] ) (04/2018), and PONV (postoperative nausea and vomiting)    Patient Active Problem List    Diagnosis Date Noted    Encounter for annual routine gynecological examination 03/23/2022    Screening mammogram, encounter for 03/23/2022    Restless legs syndrome (RLS) 01/12/2022    Status post total replacement of left hip 12/13/2021    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 11/01/2019    Lateral epicondylitis of right elbow 02/13/2019    Primary localized osteoarthritis of left hip 10/31/2018    Anxiety 03/23/2017    Depression 03/23/2017    Hyperlipidemia 07/24/2014   PMH:  Menarche 13  G3, P3;  FVD 42 wks '89,  SAVD  '91; C/S c TL for Breech '94  R Shoulder Arthroscopy '92  Bilateral Carpal Tunnel Repair  Anxiety /Depression '00  Menopause  '10-   Recovered Alcoholic   R Sciatica '15  Hypercholesterolemia   Skin Fungus- early 40's  Melanoma- L upper arm 3/17   Vulvar HSV I         Labral Tear          L Hip Replacement        Dog Bite - R Calf 3/22  She  has a past surgical history that includes  section; [place] tooth extraction; Shoulder surgery;  section (); FL injection left hip (arthrogram) (10/24/2018); FL injection left hip (non arthrogram) (2018); FL injection left hip (non arthrogram) (2019); FL injection left hip (non arthrogram) (2019); FL injection left hip (non arthrogram) (2019); FL injection left hip (non arthrogram) (2020); FL injection left hip (non arthrogram) (2020); FL injection left hip (non arthrogram) (2021); Skin biopsy (2022); Rotator cuff repair; Knee arthroscopy; and pr total hip arthroplasty (Left, 2021)  Her family history includes Alzheimer's disease in her maternal grandmother; Arthritis in her father and mother; Breast cancer (age of onset: 79) in her maternal aunt; Diabetes in her sister; Heart attack in her father, maternal aunt, and mother; Heart disease in her father; Hyperlipidemia in her mother; Hypertension in her mother; Lung cancer in her paternal aunt; No Known Problems in her brother, brother, paternal grandfather, paternal grandmother, sister, son, son, and son; Ovarian cancer in her maternal aunt; Scoliosis in her mother; Stroke in her maternal grandfather  FH:  MA- Ovarian Ca 61  MA- Breast Ca 70  PA- Lung Ca 79  M-  Arthritis, HTN, MI d 80 10/18  F- HTN, 3rd MI 80   She  reports that she quit smoking about 37 years ago  Her smoking use included cigarettes  She has never used smokeless tobacco  She reports current alcohol use  She reports that she does not use drugs  SH:    ,  '15 , he was an alcoholic too   for [employer]   Sons are doing well- Chrystine Elke  She had a brief relapse in alcoholism 1/21  Her sons live in [place], [place] and the [facility]  Visited family in [facility] 4/21  Current Outpatient Medications   Medication Sig Dispense Refill    amoxicillin-clavulanate (AUGMENTIN) 875-125 mg per tablet Take 1 tablet by mouth every 12 (twelve) hours for 10 days 20 tablet 0    ascorbic acid (VITAMIN C) 500 MG tablet Take 1 tablet (500 mg total) by mouth daily (Patient not taking: Reported on 3/9/2022 ) 60 tablet 0    aspirin (ECOTRIN LOW STRENGTH) 81 mg EC tablet Take 1 tablet (81 mg total) by mouth 2 (two) times a day Please do not start taking until after total joint arthroplasty 70 tablet 0    citalopram (CeleXA) 40 mg tablet Take 1 tablet (40 mg total) by mouth daily 90 tablet 3    docusate sodium (COLACE) 100 mg capsule Take 1 capsule (100 mg total) by mouth 2 (two) times a day (Patient not taking: Reported on 3/9/2022 ) 10 capsule 0    ferrous sulfate 324 (65 Fe) mg Take 1 tablet (324 mg total) by mouth 2 (two) times a day before meals (Patient not taking: Reported on 3/9/2022 ) 60 tablet 0    folic acid (FOLVITE) 1 mg tablet Take 1 tablet (1 mg total) by mouth daily (Patient not taking: Reported on 1/12/2022 ) 30 tablet 0    LORazepam (ATIVAN) 0 5 mg tablet TAKE ONE TABLET BY MOUTH ONCE DAILY AT BEDTIME AS NEEDED FOR ANXIETY (Patient not taking: Reported on 1/18/2022) 20 tablet 0    Multiple Vitamin (MULTI-VITAMIN DAILY PO)       pramipexole (MIRAPEX) 0 25 mg tablet 1 1/2 tabs daily 135 tablet 0     No current facility-administered medications for this visit       Current Outpatient Medications on File Prior to Visit   Medication Sig    amoxicillin-clavulanate (AUGMENTIN) 875-125 mg per tablet Take 1 tablet by mouth every 12 (twelve) hours for 10 days    ascorbic acid (VITAMIN C) 500 MG tablet Take 1 tablet (500 mg total) by mouth daily (Patient not taking: Reported on 3/9/2022 )    aspirin (ECOTRIN LOW STRENGTH) 81 mg EC tablet Take 1 tablet (81 mg total) by mouth 2 (two) times a day Please do not start taking until after total joint arthroplasty    citalopram (CeleXA) 40 mg tablet Take 1 tablet (40 mg total) by mouth daily    docusate sodium (COLACE) 100 mg capsule Take 1 capsule (100 mg total) by mouth 2 (two) times a day (Patient not taking: Reported on 3/9/2022 )    ferrous sulfate 324 (65 Fe) mg Take 1 tablet (324 mg total) by mouth 2 (two) times a day before meals (Patient not taking: Reported on 8/2/6266 )    folic acid (FOLVITE) 1 mg tablet Take 1 tablet (1 mg total) by mouth daily (Patient not taking: Reported on 1/12/2022 )    LORazepam (ATIVAN) 0 5 mg tablet TAKE ONE TABLET BY MOUTH ONCE DAILY AT BEDTIME AS NEEDED FOR ANXIETY (Patient not taking: Reported on 1/18/2022)    Multiple Vitamin (MULTI-VITAMIN DAILY PO)     pramipexole (MIRAPEX) 0 25 mg tablet 1 1/2 tabs daily     No current facility-administered medications on file prior to visit  She is allergic to monistat [tioconazole], benzoin, meloxicam, and nickel       Review of Systems   Constitutional: Negative for activity change, appetite change, fatigue and unexpected weight change  Eyes: Negative for visual disturbance  Respiratory: Negative for cough, chest tightness, shortness of breath and wheezing  Cardiovascular: Negative for chest pain, palpitations and leg swelling  Breast: Patient denies tenderness, nipple discharge, masses, or erythema  Gastrointestinal: Negative for abdominal distention, abdominal pain, blood in stool, constipation, diarrhea, nausea and vomiting  Endocrine: Negative for cold intolerance and heat intolerance  Genitourinary: Negative for decreased urine volume, difficulty urinating, dysuria, frequency, hematuria, menstrual problem, pelvic pain, urgency, vaginal bleeding, vaginal discharge and vaginal pain  Rare stress incontinence     Musculoskeletal: Positive for arthralgias (Right shoulder, both knees  )  Skin: Negative for rash  Neurological: Negative for weakness, light-headedness, numbness and headaches  Hematological: Does not bruise/bleed easily  Psychiatric/Behavioral: Negative for agitation, behavioral problems and sleep disturbance  The patient is not nervous/anxious  Objective:    Vitals:    03/24/22 0822   BP: 126/70   Weight: 83 9 kg (185 lb)   Height: 5' 4" (1 626 m)            Physical Exam  Vitals and nursing note reviewed  Exam conducted with a chaperone present  Constitutional:       General: She is not in acute distress  Appearance: She is well-developed  HENT:      Head: Normocephalic and atraumatic  Eyes:      General: No scleral icterus  Right eye: No discharge  Left eye: No discharge  Extraocular Movements: Extraocular movements intact  Conjunctiva/sclera: Conjunctivae normal    Neck:      Thyroid: No thyromegaly  Trachea: No tracheal deviation  Cardiovascular:      Rate and Rhythm: Normal rate and regular rhythm  Heart sounds: Normal heart sounds  No murmur heard  Pulmonary:      Effort: Pulmonary effort is normal  No respiratory distress  Breath sounds: Normal breath sounds  No wheezing  Chest:   Breasts: Breasts are symmetrical       Right: No inverted nipple, mass, nipple discharge, skin change or tenderness  Left: No inverted nipple, mass, nipple discharge, skin change or tenderness  Abdominal:      General: Bowel sounds are normal  There is no distension  Palpations: Abdomen is soft  There is no mass  Tenderness: There is no abdominal tenderness  There is no guarding or rebound  Genitourinary:     General: Normal vulva  Labia:         Right: No rash, tenderness or lesion  Left: No rash, tenderness or lesion  Vagina: Normal       Cervix: No cervical motion tenderness or discharge  Uterus: Not deviated, not enlarged and not tender         Adnexa: Right: No mass, tenderness or fullness  Left: No mass, tenderness or fullness  Rectum: No external hemorrhoid  Comments: Urethral meatus within normal limits  Perineum within normal limits  Bladder well supported  Physiologic vaginal atrophy  Musculoskeletal:         General: No tenderness  Normal range of motion  Cervical back: Normal range of motion and neck supple  Lymphadenopathy:      Cervical: No cervical adenopathy  Skin:     General: Skin is warm and dry  Neurological:      Mental Status: She is alert and oriented to person, place, and time  Psychiatric:         Mood and Affect: Mood normal          Behavior: Behavior normal          Thought Content:  Thought content normal          Judgment: Judgment normal

## 2022-03-24 ENCOUNTER — ANNUAL EXAM (OUTPATIENT)
Dept: OBGYN CLINIC | Facility: CLINIC | Age: 62
End: 2022-03-24
Payer: COMMERCIAL

## 2022-03-24 VITALS
HEIGHT: 64 IN | WEIGHT: 185 LBS | DIASTOLIC BLOOD PRESSURE: 70 MMHG | BODY MASS INDEX: 31.58 KG/M2 | SYSTOLIC BLOOD PRESSURE: 126 MMHG

## 2022-03-24 DIAGNOSIS — Z01.419 ENCOUNTER FOR ANNUAL ROUTINE GYNECOLOGICAL EXAMINATION: Primary | ICD-10-CM

## 2022-03-24 DIAGNOSIS — Z12.31 SCREENING MAMMOGRAM, ENCOUNTER FOR: ICD-10-CM

## 2022-03-24 DIAGNOSIS — Z12.11 SCREENING FOR COLORECTAL CANCER: ICD-10-CM

## 2022-03-24 DIAGNOSIS — Z12.12 SCREENING FOR COLORECTAL CANCER: ICD-10-CM

## 2022-03-24 PROCEDURE — S0612 ANNUAL GYNECOLOGICAL EXAMINA: HCPCS | Performed by: OBSTETRICS & GYNECOLOGY

## 2022-04-01 ENCOUNTER — TELEPHONE (OUTPATIENT)
Dept: GASTROENTEROLOGY | Facility: CLINIC | Age: 62
End: 2022-04-01

## 2022-04-01 ENCOUNTER — PREP FOR PROCEDURE (OUTPATIENT)
Dept: GASTROENTEROLOGY | Facility: CLINIC | Age: 62
End: 2022-04-01

## 2022-04-01 DIAGNOSIS — Z86.010 HX OF COLONIC POLYP: Primary | ICD-10-CM

## 2022-04-01 NOTE — TELEPHONE ENCOUNTER
TC to pt to schedule OA colon  Scheduled date of colonoscopy (as of today):  6/27/22  Physician performing colonoscopy:  Dr Mara Miller  Location of colonoscopy:   [facility]  Bowel prep reviewed with patient:  Dulcolax/Miralax  Instructions reviewed with patient by:  Jaron Fang - mailed to pt's home  Clearances: n/a

## 2022-04-06 ENCOUNTER — PROCEDURE VISIT (OUTPATIENT)
Dept: DERMATOLOGY | Facility: CLINIC | Age: 62
End: 2022-04-06
Payer: COMMERCIAL

## 2022-04-06 VITALS — HEIGHT: 64 IN | BODY MASS INDEX: 32.03 KG/M2 | WEIGHT: 187.6 LBS

## 2022-04-06 DIAGNOSIS — C44.91 BASAL CELL CARCINOMA (BCC), UNSPECIFIED SITE: Primary | ICD-10-CM

## 2022-04-06 PROCEDURE — 11602 EXC TR-EXT MAL+MARG 1.1-2 CM: CPT | Performed by: DERMATOLOGY

## 2022-04-06 PROCEDURE — 88305 TISSUE EXAM BY PATHOLOGIST: CPT | Performed by: PATHOLOGY

## 2022-04-06 PROCEDURE — 12032 INTMD RPR S/A/T/EXT 2.6-7.5: CPT | Performed by: DERMATOLOGY

## 2022-04-06 NOTE — PROGRESS NOTES
PROCEDURE:  EXCISION WITH INTERMEDIATE LAYERED CLOSURE     Attending: Fracisco Ramirez and Dr Nora Bermudez   Assistant: Herschel Saint    Pre-Op Diagnosis: Basal Cell Carcinoma   Post-Op Diagnosis: Same  Malignant       Lesion Anatomic Location: Right shoulder (Previous Accession Number: [id number])  Pre-op size: 1 0 x 0 7cm  Size of defect:  2 0 x 1 7 (with 0 5 centimeter margins)  Final repaired wound length:  4 5 cm    Written and verbal, witnessed informed consent was obtained  I discussed that excision is a method of removing lesions both benign and malignant lesions  A portion of normal skin is often taken to ensure completeness of removal   I reviewed that procedure will include numbing the area,  cutting around and under defect, undermining tissue, and closing the wound with sutures both inside and out  These sutures are usually removed in 7 to 14 days  Risks (bleeding, pain, infection, scarring, recurrence) and benefits discussed  It was discussed with patient that every effort is made to minimize scar, but scarring is influenced also by extrinsic factor such as location, age and genetics  Time Out: performed:  yes  Correct patient: yes  Correct site per Clinic Report: yes  Correct site per Patient Report: yes    LOCAL ANESTHESIA: 1% xylocaine with epi     DESCRIPTION OF PROCEDURE: The patient was brought back into the procedure room, anesthetized locally, prepped and draped in the usual fashion  Using a #15 blade with a scalpel, the lesion was excised in elliptical fashion  The wound was  undermined in the  fascial plane  Hemostasis was achieved with light electrocoagulation  Purpose of undermining was to decrease wound tension and facilitate closure  The wound was closed with subcutaneous sutures as follows:    Deep suture:3-0 Vicryl      Epidermal edge closure was accomplished with superficial sutures as follows:    Superficial suture: 4-0 Prolene  Superficial suture type:  Interrupted     Estimated blood loss less than 3ml  The patient tolerated the procedure well without any complications  The wound was cleaned with sterile saline, dried off, surgical vaseline ointment was applied, and the wound was covered  A pressure dressing was applied for stabilization and light pressure  The patient was given detailed oral and written instructions on postoperative care as detailed in consent  The patient left in good medical condition  POSTOP DISCUSSION DISCUSSION AND INSTRUCTIONS FOR PATIENT      Rationale for Procedure  A skin excision allows the dermatologist to remove a lesion  The procedure involves a local numbing medication and removing the entire lesion  Typically, the lesion is being removed because it is atypical, traumatized, or for significant appearance reasons  The area will be open like a brush burn and allowed to heal    There will be no sutures  Tissue is sent to pathologist who will reconfirm diagnosis and verify completeness of lesion removal     Description of Procedure  We would like to perform a skin excision today  A local anesthetic, similar to the kind that a dentist uses when filling a cavity, will be injected with a very small needle into the skin area to be sampled  The injected skin and tissue underneath should go to sleep and become numbed so that no further pain should be felt  A scalpel will be used to cut around the lesion and tissue will be submitted to pathology for examination  Depending on the diagnosis the lesion will be excised with a certain amount of normal skin to help assure completeness of lesion removal   The physician will discuss in advance the anticipated size and extent of removal    Bleeding will occur, but it will stopped with direct pressure, sutures, and electrocautery  Surgical Vaseline-type ointment will also applied after the procedure to help create a barrier between the wound and the outside world        Risks and Potential Complications  The advantage of a skin excision is that it allows us to remove a problem lesion quickly  Although this usually permits the lesion to heal as soon as possible with the least scarring, there are some risks and potential complications that include but are not limited to the following:  - Some bleeding is normal at time of procedure and some bleeding on gauze is normal  the first few days after surgery  Profuse bleeding and bleeding with swelling and pain should be reported as detailed  below  - Infection is uncommon in skin surgery  Infection should be reported and is indicated by pain, redness, and discharge of purulent material   - Some dull to at time sharp pain could occur initially the day after surgery  Persistent pain or increasing pain days after surgery is not expected and should be reported  - Every effort is made to minimize scar, but location, size, and genetics do play a role in scar appearance  A surgical wound does not achieve its optimal appearance until 6 months  There are several treatments available if scarring would be problematic including scar creams, silicone pad, laser and scar revision   - Skin discoloration can occur especially in people of color  Its important to avoid sun on wound in first 6 months after surgery  Treatment is available if pigment is problematic   - Incomplete removal of the lesion or recurrence of lesion can occur and this would then require further treatment and more surgery   - Nerve Damage/Numbness/Loss of Function is very rare, but is most likely to occur if lesion is large or if it is in a high risk location  - Allergic Reaction to lidocaine is rare  More commonly,  epinephrine is used  with the lidocaine  Occasionally, epinephrine (aka adrenalin) may cause a brief  feeling of anxiety or jitteriness  - The person at the microscope  (pathologist) may provide additional information that was unexpected   This unexpected finding could provoke the need for additional treatment or evaluation  What You Will Need to Do After the Procedure  1  Keep the area clean and dry the first day  Try NOT to remove the bandage for the first day  2  Gently clean the area with soap and water and apply Vaseline ointment (this is over the counter and not a prescription) to the excision  site for up to 2 weeks  3  Apply a clean appropriately sized bandage to area  Gauze and paper tape are recommended for sensitive skin  4  Return for suture removal as instructed (generally 1 week for the face, 2 weeks for the body)  5  Take Acetaminophen (Tylenol) for discomfort, if no contraindications  Do NOT take Ibuprofen or aspirin unless specifically told to do so by your Dermatologist because these medications can make bleeding worse  6  Call our office immediately for signs of infection: fever, chills, increased redness, warmth, tenderness, discomfort/pain, or pus or foul smell coming from the wound  If bleeding is noticed, place a clean cloth over the area and apply firm pressure for thirty minutes  Check the wound ONLY after 30 minutes of direct pressure; do not cheat and sneak a peak, as that does not count  If bleeding persists after 30 minutes of legitimate direct pressure, then try one more round of direct pressure for an additional 10 minutes to the area  Should the bleeding become heavier or not stop after the second attempt, call [facility] directly at [phone] (SKIN) or, if after hours, go to your local Emergency Room/Emergency Department  BCC excised with 0 5cm margins for 2cm excision and 4 5cm closure  Well tolerated  S/R in 2 weeks      Scribe Attestation    I,:  Bernadine Gold MA am acting as a scribe while in the presence of the attending physician :       I,:  Eddie Overton MD personally performed the services described in this documentation    as scribed in my presence :

## 2022-04-06 NOTE — PATIENT INSTRUCTIONS
POSTOP DISCUSSION DISCUSSION AND INSTRUCTIONS FOR PATIENT      Rationale for Procedure  A skin excision allows the dermatologist to remove a lesion  The procedure involves a local numbing medication and removing the entire lesion  Typically, the lesion is being removed because it is atypical, traumatized, or for significant appearance reasons  The area will be open like a brush burn and allowed to heal    There will be no sutures  Tissue is sent to pathologist who will reconfirm diagnosis and verify completeness of lesion removal     Description of Procedure  We would like to perform a skin excision today  A local anesthetic, similar to the kind that a dentist uses when filling a cavity, will be injected with a very small needle into the skin area to be sampled  The injected skin and tissue underneath should go to sleep and become numbed so that no further pain should be felt  A scalpel will be used to cut around the lesion and tissue will be submitted to pathology for examination  Depending on the diagnosis the lesion will be excised with a certain amount of normal skin to help assure completeness of lesion removal   The physician will discuss in advance the anticipated size and extent of removal    Bleeding will occur, but it will stopped with direct pressure, sutures, and electrocautery  Surgical Vaseline-type ointment will also applied after the procedure to help create a barrier between the wound and the outside world  Risks and Potential Complications  The advantage of a skin excision is that it allows us to remove a problem lesion quickly  Although this usually permits the lesion to heal as soon as possible with the least scarring, there are some risks and potential complications that include but are not limited to the following:  - Some bleeding is normal at time of procedure and some bleeding on gauze is normal  the first few days after surgery    Profuse bleeding and bleeding with swelling and pain should be reported as detailed  below  - Infection is uncommon in skin surgery  Infection should be reported and is indicated by pain, redness, and discharge of purulent material   - Some dull to at time sharp pain could occur initially the day after surgery  Persistent pain or increasing pain days after surgery is not expected and should be reported  - Every effort is made to minimize scar, but location, size, and genetics do play a role in scar appearance  A surgical wound does not achieve its optimal appearance until 6 months  There are several treatments available if scarring would be problematic including scar creams, silicone pad, laser and scar revision   - Skin discoloration can occur especially in people of color  Its important to avoid sun on wound in first 6 months after surgery  Treatment is available if pigment is problematic   - Incomplete removal of the lesion or recurrence of lesion can occur and this would then require further treatment and more surgery   - Nerve Damage/Numbness/Loss of Function is very rare, but is most likely to occur if lesion is large or if it is in a high risk location  - Allergic Reaction to lidocaine is rare  More commonly,  epinephrine is used  with the lidocaine  Occasionally, epinephrine (aka adrenalin) may cause a brief  feeling of anxiety or jitteriness  - The person at the microscope  (pathologist) may provide additional information that was unexpected  This unexpected finding could provoke the need for additional treatment or evaluation  What You Will Need to Do After the Procedure  1  Keep the area clean and dry the first day  Try NOT to remove the bandage for the first day  2  Gently clean the area with soap and water and apply Vaseline ointment (this is over the counter and not a prescription) to the excision  site for up to 2 weeks  3  Apply a clean appropriately sized bandage to area    Gauze and paper tape are recommended for sensitive skin   4  Return for suture removal as instructed (generally 1 week for the face, 2 weeks for the body)  5  Take Acetaminophen (Tylenol) for discomfort, if no contraindications  Do NOT take Ibuprofen or aspirin unless specifically told to do so by your Dermatologist because these medications can make bleeding worse  6  Call our office immediately for signs of infection: fever, chills, increased redness, warmth, tenderness, discomfort/pain, or pus or foul smell coming from the wound  If bleeding is noticed, place a clean cloth over the area and apply firm pressure for thirty minutes  Check the wound ONLY after 30 minutes of direct pressure; do not cheat and sneak a peak, as that does not count  If bleeding persists after 30 minutes of legitimate direct pressure, then try one more round of direct pressure for an additional 10 minutes to the area  Should the bleeding become heavier or not stop after the second attempt, call [facility] directly at [phone] (SKIN) or, if after hours, go to your local Emergency Room/Emergency Department

## 2022-04-14 DIAGNOSIS — G25.81 RESTLESS LEG SYNDROME: ICD-10-CM

## 2022-04-14 RX ORDER — PRAMIPEXOLE DIHYDROCHLORIDE 0.25 MG/1
TABLET ORAL
Qty: 135 TABLET | Refills: 0 | Status: SHIPPED | OUTPATIENT
Start: 2022-04-14

## 2022-04-19 ENCOUNTER — OFFICE VISIT (OUTPATIENT)
Dept: DERMATOLOGY | Facility: CLINIC | Age: 62
End: 2022-04-19

## 2022-04-19 DIAGNOSIS — Z48.02 ENCOUNTER FOR REMOVAL OF SUTURES: Primary | ICD-10-CM

## 2022-04-19 PROCEDURE — RECHECK: Performed by: DERMATOLOGY

## 2022-04-19 NOTE — PROGRESS NOTES
Suture removal    Date/Time: 4/19/2022 4:11 PM  Performed by: Leonardo Toney RN  Authorized by: Daija Lopez MD   Universal Protocol:  Consent: Verbal consent obtained  Written consent not obtained  Risks and benefits: risks, benefits and alternatives were discussed  Consent given by: patient  Time out: Immediately prior to procedure a "time out" was called to verify the correct patient, procedure, equipment, support staff and site/side marked as required  Timeout called at: 4/19/2022 4:12 PM   Patient understanding: patient states understanding of the procedure being performed  Patient consent: the patient's understanding of the procedure matches consent given  Procedure consent: procedure consent matches procedure scheduled  Relevant documents: relevant documents present and verified  Test results: test results available and properly labeled  Site marked: the operative site was marked  Radiology Images displayed and confirmed  If images not available, report reviewed: imaging studies not available  Patient identity confirmed: verbally with patient        Patient location:  Clinic  Location:     Laterality:  Right    Location:  Upper extremity    Upper extremity location:  Shoulder    Shoulder location:  R shoulder  Procedure details: Tools used:  Suture removal kit    Wound appearance:  No sign(s) of infection, good wound healing, pink and nontender    Number of sutures removed:  9  Post-procedure details:     Post-removal:  Dressing applied (vaseline applied )    Patient tolerance of procedure:   Tolerated well, no immediate complications  Comments:      Patient instructed to follow up in 6 months for a full body skin exam  Pt placed on recall list

## 2022-05-11 ENCOUNTER — TELEPHONE (OUTPATIENT)
Dept: FAMILY MEDICINE CLINIC | Facility: CLINIC | Age: 62
End: 2022-05-11

## 2022-05-12 ENCOUNTER — OFFICE VISIT (OUTPATIENT)
Dept: FAMILY MEDICINE CLINIC | Facility: CLINIC | Age: 62
End: 2022-05-12
Payer: COMMERCIAL

## 2022-05-12 VITALS
DIASTOLIC BLOOD PRESSURE: 80 MMHG | BODY MASS INDEX: 33.35 KG/M2 | RESPIRATION RATE: 16 BRPM | HEIGHT: 63 IN | OXYGEN SATURATION: 97 % | SYSTOLIC BLOOD PRESSURE: 130 MMHG | HEART RATE: 61 BPM | TEMPERATURE: 98.8 F | WEIGHT: 188.2 LBS

## 2022-05-12 DIAGNOSIS — Z12.12 SCREENING FOR COLORECTAL CANCER: ICD-10-CM

## 2022-05-12 DIAGNOSIS — Z00.00 ANNUAL PHYSICAL EXAM: Primary | ICD-10-CM

## 2022-05-12 DIAGNOSIS — F33.0 MILD EPISODE OF RECURRENT MAJOR DEPRESSIVE DISORDER (HCC): ICD-10-CM

## 2022-05-12 DIAGNOSIS — Z12.11 SCREENING FOR COLORECTAL CANCER: ICD-10-CM

## 2022-05-12 PROCEDURE — 99396 PREV VISIT EST AGE 40-64: CPT | Performed by: FAMILY MEDICINE

## 2022-05-12 NOTE — PATIENT INSTRUCTIONS

## 2022-05-12 NOTE — PROGRESS NOTES
[facility]    NAME: Attila Beard  AGE: 64 y o  SEX: female  : 1960     DATE: 2022     Assessment and Plan:     Problem List Items Addressed This Visit        Other    Depression     Doing well on Citalopram           Relevant Orders    TSH, 3rd generation with Free T4 reflex      Other Visit Diagnoses     Annual physical exam    -  Primary    Relevant Orders    Comprehensive metabolic panel    CBC and differential    Lipid Panel with Direct LDL reflex    Hemoglobin A1C    Screening for colorectal cancer              Immunizations and preventive care screenings were discussed with patient today  Appropriate education was printed on patient's after visit summary  Counseling:  Alcohol/drug use: discussed moderation in alcohol intake, the recommendations for healthy alcohol use, and avoidance of illicit drug use  Dental Health: discussed importance of regular tooth brushing, flossing, and dental visits  Injury prevention: discussed safety/seat belts, safety helmets, smoke detectors, carbon dioxide detectors, and smoking near bedding or upholstery  Sexual health: discussed sexually transmitted diseases, partner selection, use of condoms, avoidance of unintended pregnancy, and contraceptive alternatives  Exercise: the importance of regular exercise/physical activity was discussed  Recommend exercise 3-5 times per week for at least 30 minutes  No follow-ups on file  Chief Complaint:     Chief Complaint   Patient presents with    Physical Exam     Patient is here today for an annual exam       History of Present Illness:     Adult Annual Physical   Patient here for a comprehensive physical exam  The patient reports no problems  Diet and Physical Activity  Diet/Nutrition: well balanced diet and consuming 3-5 servings of fruits/vegetables daily  Exercise: walking        Depression Screening  PHQ-2/9 Depression Screening         General Health  Sleep: sleeps well and gets 7-8 hours of sleep on average  Hearing: normal - bilateral   Vision: goes for regular eye exams and wears glasses  Dental: regular dental visits and brushes teeth twice daily  /GYN Health  Patient is: postmenopausal  Last menstrual period: over 10 years ago   Contraceptive method: none  Review of Systems:     Review of Systems   Constitutional: Negative  HENT: Negative  Eyes: Negative  Respiratory: Negative  Cardiovascular: Negative  Gastrointestinal: Negative  Endocrine: Negative  Genitourinary: Negative  Musculoskeletal: Negative  Skin: Negative  Allergic/Immunologic: Negative  Neurological: Negative  Hematological: Negative  Psychiatric/Behavioral: Negative         Past Medical History:     Past Medical History:   Diagnosis Date    Anxiety     Arthritis     Basal cell carcinoma 2022    Right shoulder, excision     Cancer ([facility] [address] )     Depression     Melanoma ([facility] [address] ) 2018    Left upper arm    PONV (postoperative nausea and vomiting)       Past Surgical History:     Past Surgical History:   Procedure Laterality Date     SECTION       SECTION      FL INJECTION LEFT HIP (ARTHROGRAM)  10/24/2018    FL INJECTION LEFT HIP (NON ARTHROGRAM)  2018    FL INJECTION LEFT HIP (NON ARTHROGRAM)  2019    FL INJECTION LEFT HIP (NON ARTHROGRAM)  2019    FL INJECTION LEFT HIP (NON ARTHROGRAM)  2019    FL INJECTION LEFT HIP (NON ARTHROGRAM)  2020    FL INJECTION LEFT HIP (NON ARTHROGRAM)  2020    FL INJECTION LEFT HIP (NON ARTHROGRAM)  2021    KNEE ARTHROSCOPY      CT TOTAL HIP ARTHROPLASTY Left 2021    Procedure: ARTHROPLASTY HIP TOTAL ANTERIOR;  Surgeon: Inna Kitchen MD;  [facility];  Service: Orthopedics    ROTATOR CUFF REPAIR      SHOULDER SURGERY      SKIN BIOPSY  2022    WISDOM TOOTH EXTRACTION        Social History:     Social History     Socioeconomic History    Marital status:      Spouse name: None    Number of children: 3    Years of education: None    Highest education level: None   Occupational History     Comment: FULL-TIME EMPLOYMENT   Tobacco Use    Smoking status: Former Smoker     Types: Cigarettes     Quit date:      Years since quittin 3    Smokeless tobacco: Never Used   Vaping Use    Vaping Use: Never used   Substance and Sexual Activity    Alcohol use: Yes     Comment: weekends    Drug use: No    Sexual activity: Not Currently     Birth control/protection: Post-menopausal   Other Topics Concern    None   Social History Narrative    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, 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    EXERCISES 3 TO 4 TIMES PER WEEK     Social Determinants of Health     Financial Resource Strain: Not on file   Food Insecurity: Not on file   Transportation Needs: Not on file   Physical Activity: Not on file   Stress: Not on file   Social Connections: Not on file   Intimate Partner Violence: Not on file   Housing Stability: Not on file      Family History:     Family History   Problem Relation Age of Onset    Arthritis Mother     Hypertension Mother     Scoliosis Mother     Hyperlipidemia Mother     Heart attack Mother     Arthritis Father     Heart disease Father         CARDIAC DISORDER    Heart attack Father     Ovarian cancer Maternal Aunt     Diabetes Sister         borderline    No Known Problems Brother     No Known Problems Son     Alzheimer's disease Maternal Grandmother     Stroke Maternal Grandfather     No Known Problems Paternal Grandmother     No Known Problems Paternal Grandfather     No Known Problems Sister     No Known Problems Brother     No Known Problems Son     No Known Problems Son     Breast cancer Maternal Aunt 79    Heart attack Maternal Aunt     Lung cancer Paternal Aunt       Current Medications:     Current Outpatient Medications   Medication Sig Dispense Refill    CALCIUM PO Take 600 mg by mouth in the morning      citalopram (CeleXA) 40 mg tablet Take 1 tablet (40 mg total) by mouth daily 90 tablet 3    Multiple Vitamin (MULTI-VITAMIN DAILY PO)       pramipexole (MIRAPEX) 0 25 mg tablet 1 1/2 tabs daily 135 tablet 0     No current facility-administered medications for this visit  Allergies: Allergies   Allergen Reactions    Monistat [Tioconazole] Other (See Comments)     Severe vaginal burning    Benzoin Rash    Meloxicam Rash    Nickel Itching and Rash      Physical Exam:     /80 (BP Location: Left arm, Patient Position: Sitting, Cuff Size: Adult)   Pulse 61   Temp 98 8 °F (37 1 °C) (Tympanic)   Resp 16   Ht 5' 3 27" (1 607 m)   Wt 85 4 kg (188 lb 3 2 oz)   SpO2 97%   BMI 33 06 kg/m²     Physical Exam  Vitals and nursing note reviewed  Constitutional:       General: She is not in acute distress  Appearance: She is well-developed  HENT:      Head: Normocephalic and atraumatic  Right Ear: Tympanic membrane normal       Left Ear: Tympanic membrane normal       Nose: Nose normal    Eyes:      Conjunctiva/sclera: Conjunctivae normal    Cardiovascular:      Rate and Rhythm: Normal rate and regular rhythm  Heart sounds: No murmur heard  Pulmonary:      Effort: Pulmonary effort is normal  No respiratory distress  Breath sounds: Normal breath sounds  Abdominal:      Palpations: Abdomen is soft  Tenderness: There is no abdominal tenderness  Musculoskeletal:      Cervical back: Neck supple  Skin:     General: Skin is warm and dry  Capillary Refill: Capillary refill takes less than 2 seconds  Neurological:      General: No focal deficit present  Mental Status: She is alert and oriented to person, place, and time     Psychiatric:         Mood and Affect: Mood normal          Behavior: Behavior normal           Cailin Iyer MD  [facility]

## 2022-06-10 ENCOUNTER — TELEPHONE (OUTPATIENT)
Dept: GASTROENTEROLOGY | Facility: CLINIC | Age: 62
End: 2022-06-10

## 2022-06-10 NOTE — TELEPHONE ENCOUNTER
VM from pt - needs a more narrow time window for procedure 6/27/22 d/t transportation arrangements  Also question about antibiotics prior to procedure, as is recommended by ortho surgeon who completed hip surgery in December 2021  TC to pt - advised that pt will be set for approx 7:30 arrival for 8:30 procedure at [address] on 6/27/22 - note placed requesting time for GI lab staff  Will be confirmed on 6/24/22  Advised pt that this writer will be in contact with Dr Elisha Forte re: antibiotics

## 2022-06-13 ENCOUNTER — TELEPHONE (OUTPATIENT)
Dept: GASTROENTEROLOGY | Facility: CLINIC | Age: 62
End: 2022-06-13

## 2022-06-13 NOTE — TELEPHONE ENCOUNTER
----- Message from [address] DO Karen sent at 6/10/2022  3:45 PM EDT -----  Regarding: RE: antibiotics  You are correct, this if ok  Usually we say antibiotics are not needed for this indication during a colonoscopy but if her doctor wants her to have this then it is ok  Reynold Burt  ----- Message -----  From: Miesha Catherine  Sent: 6/10/2022  11:27 AM EDT  To: Abby Conklin DO, Miesha Catherine  Subject: antibiotics                                      Good morning, Dr Lynda Ac,    This patient is scheduled with you at [facility] for OA colon on 6/27/22  She informed me that she will be on a course of antibiotics in advance of her scheduled procedure, under the advice of her ortho surgeon who did her hip replacement in December  She wanted to know if the antibiotics would interfere with her prep or her procedure  I am inclined to tell her there is no contraindication, but of course I defer to your judgement!       Thank you,    Audrie Bosworth

## 2022-06-26 RX ORDER — SODIUM CHLORIDE, SODIUM LACTATE, POTASSIUM CHLORIDE, CALCIUM CHLORIDE 600; 310; 30; 20 MG/100ML; MG/100ML; MG/100ML; MG/100ML
125 INJECTION, SOLUTION INTRAVENOUS CONTINUOUS
Status: CANCELLED | OUTPATIENT
Start: 2022-06-26

## 2022-06-27 ENCOUNTER — ANESTHESIA EVENT (OUTPATIENT)
Dept: GASTROENTEROLOGY | Facility: AMBULARY SURGERY CENTER | Age: 62
End: 2022-06-27

## 2022-06-27 ENCOUNTER — ANESTHESIA (OUTPATIENT)
Dept: GASTROENTEROLOGY | Facility: AMBULARY SURGERY CENTER | Age: 62
End: 2022-06-27

## 2022-06-27 ENCOUNTER — HOSPITAL ENCOUNTER (OUTPATIENT)
Dept: GASTROENTEROLOGY | Facility: AMBULARY SURGERY CENTER | Age: 62
Setting detail: OUTPATIENT SURGERY
Discharge: HOME | End: 2022-06-27
Attending: INTERNAL MEDICINE
Payer: COMMERCIAL

## 2022-06-27 VITALS
TEMPERATURE: 97 F | HEIGHT: 64 IN | SYSTOLIC BLOOD PRESSURE: 149 MMHG | WEIGHT: 182 LBS | RESPIRATION RATE: 20 BRPM | HEART RATE: 63 BPM | OXYGEN SATURATION: 98 % | DIASTOLIC BLOOD PRESSURE: 68 MMHG | BODY MASS INDEX: 31.07 KG/M2

## 2022-06-27 DIAGNOSIS — Z86.010 HX OF COLONIC POLYP: ICD-10-CM

## 2022-06-27 PROBLEM — C80.1 CANCER (HCC): Status: ACTIVE | Noted: 2022-06-27

## 2022-06-27 PROCEDURE — 88305 TISSUE EXAM BY PATHOLOGIST: CPT | Performed by: PATHOLOGY

## 2022-06-27 PROCEDURE — 45385 COLONOSCOPY W/LESION REMOVAL: CPT | Performed by: INTERNAL MEDICINE

## 2022-06-27 RX ORDER — SODIUM CHLORIDE, SODIUM LACTATE, POTASSIUM CHLORIDE, CALCIUM CHLORIDE 600; 310; 30; 20 MG/100ML; MG/100ML; MG/100ML; MG/100ML
INJECTION, SOLUTION INTRAVENOUS CONTINUOUS PRN
Status: DISCONTINUED | OUTPATIENT
Start: 2022-06-27 | End: 2022-06-27

## 2022-06-27 RX ORDER — PROPOFOL 10 MG/ML
INJECTION, EMULSION INTRAVENOUS AS NEEDED
Status: DISCONTINUED | OUTPATIENT
Start: 2022-06-27 | End: 2022-06-27

## 2022-06-27 RX ORDER — AMOXICILLIN 500 MG/1
500 CAPSULE ORAL DAILY
COMMUNITY
Start: 2022-06-27

## 2022-06-27 RX ORDER — LIDOCAINE HYDROCHLORIDE 10 MG/ML
INJECTION, SOLUTION EPIDURAL; INFILTRATION; INTRACAUDAL; PERINEURAL AS NEEDED
Status: DISCONTINUED | OUTPATIENT
Start: 2022-06-27 | End: 2022-06-27

## 2022-06-27 RX ORDER — PROPOFOL 10 MG/ML
INJECTION, EMULSION INTRAVENOUS CONTINUOUS PRN
Status: DISCONTINUED | OUTPATIENT
Start: 2022-06-27 | End: 2022-06-27

## 2022-06-27 RX ADMIN — LIDOCAINE HYDROCHLORIDE 50 MG: 10 INJECTION, SOLUTION EPIDURAL; INFILTRATION; INTRACAUDAL at 08:41

## 2022-06-27 RX ADMIN — SODIUM CHLORIDE, SODIUM LACTATE, POTASSIUM CHLORIDE, AND CALCIUM CHLORIDE: .6; .31; .03; .02 INJECTION, SOLUTION INTRAVENOUS at 08:20

## 2022-06-27 RX ADMIN — PROPOFOL 100 MCG/KG/MIN: 10 INJECTION, EMULSION INTRAVENOUS at 08:41

## 2022-06-27 RX ADMIN — PROPOFOL 50 MG: 10 INJECTION, EMULSION INTRAVENOUS at 08:46

## 2022-06-27 RX ADMIN — PROPOFOL 100 MG: 10 INJECTION, EMULSION INTRAVENOUS at 08:41

## 2022-06-27 NOTE — ANESTHESIA POSTPROCEDURE EVALUATION
Post-Op Assessment Note    CV Status:  Stable  Pain Score: 0    Pain management: adequate     Mental Status:  Awake   Hydration Status:  Euvolemic   PONV Controlled:  Controlled   Airway Patency:  Patent      Post Op Vitals Reviewed: Yes      Staff: CRNA         No complications documented      BP   102/63   Temp     Pulse  83   Resp      SpO2   98

## 2022-06-27 NOTE — ANESTHESIA PREPROCEDURE EVALUATION
Procedure:  COLONOSCOPY    Relevant Problems   CARDIO   (+) Hyperlipidemia      MUSCULOSKELETAL   (+) Lateral epicondylitis of right elbow   (+) Primary localized osteoarthritis of left hip      NEURO/PSYCH   (+) Anxiety   (+) Depression      PONV (postoperative nausea and vomiting)    Melanoma (HCC) Left upper arm   Arthritis    Cancer (HCC)    Anxiety    Depression    Basal cell carcinoma Right shoulder, excision      Anxiety   Depression   Hyperlipidemia   Primary localized osteoarthritis of left hip   Lateral epicondylitis of right elbow   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   Status post total replacement of left hip   Restless legs syndrome (RLS)   Encounter for annual routine gynecological examination   Screening mammogram, encounter for         Physical Exam    Airway    Mallampati score: II  TM Distance: >3 FB  Neck ROM: full     Dental       Cardiovascular  Cardiovascular exam normal    Pulmonary  Pulmonary exam normal     Other Findings        Anesthesia Plan  ASA Score- 2     Anesthesia Type- IV sedation with anesthesia with ASA Monitors  Additional Monitors:   Airway Plan:           Plan Factors-Exercise tolerance (METS): >4 METS  Chart reviewed  EKG reviewed  Imaging results reviewed  Existing labs reviewed  Patient summary reviewed  Induction- intravenous  Postoperative Plan-     Informed Consent- Anesthetic plan and risks discussed with patient  I personally reviewed this patient with the CRNA  Discussed and agreed on the Anesthesia Plan with the CRNA  Jackie Arce

## 2022-07-05 DIAGNOSIS — F33.0 MILD EPISODE OF RECURRENT MAJOR DEPRESSIVE DISORDER (HCC): ICD-10-CM

## 2022-07-05 RX ORDER — CITALOPRAM 40 MG/1
TABLET ORAL
Qty: 90 TABLET | Refills: 0 | Status: SHIPPED | OUTPATIENT
Start: 2022-07-05 | End: 2022-10-06 | Stop reason: SDUPTHER

## 2022-07-19 ENCOUNTER — IMMUNIZATIONS (OUTPATIENT)
Dept: FAMILY MEDICINE CLINIC | Facility: CLINIC | Age: 62
End: 2022-07-19
Payer: COMMERCIAL

## 2022-07-19 DIAGNOSIS — Z23 ENCOUNTER FOR IMMUNIZATION: Primary | ICD-10-CM

## 2022-07-19 PROCEDURE — 0054A PR IMM ADMN SARSCOV2 30MCG/0.3ML TRIS-SUCROSE BST: CPT

## 2022-07-19 PROCEDURE — 91305 PR SARSCOV2 VACCINE 30MCG/0.3ML TRIS-SUCROSE IM USE: CPT

## 2022-08-13 ENCOUNTER — HOSPITAL ENCOUNTER (OUTPATIENT)
Dept: RADIOLOGY | Age: 62
Discharge: HOME | End: 2022-08-13
Payer: COMMERCIAL

## 2022-08-13 VITALS — BODY MASS INDEX: 29.88 KG/M2 | HEIGHT: 64 IN | WEIGHT: 175 LBS

## 2022-08-13 DIAGNOSIS — Z12.31 ENCOUNTER FOR SCREENING MAMMOGRAM FOR MALIGNANT NEOPLASM OF BREAST: ICD-10-CM

## 2022-08-13 DIAGNOSIS — Z12.31 SCREENING MAMMOGRAM, ENCOUNTER FOR: ICD-10-CM

## 2022-08-13 PROCEDURE — 77067 SCR MAMMO BI INCL CAD: CPT

## 2022-08-13 PROCEDURE — 77063 BREAST TOMOSYNTHESIS BI: CPT

## 2022-09-15 ENCOUNTER — APPOINTMENT (OUTPATIENT)
Dept: LAB | Facility: CLINIC | Age: 62
End: 2022-09-15
Payer: COMMERCIAL

## 2022-09-15 DIAGNOSIS — Z00.8 ENCOUNTER FOR OTHER GENERAL EXAMINATION: ICD-10-CM

## 2022-09-15 DIAGNOSIS — F33.0 MILD EPISODE OF RECURRENT MAJOR DEPRESSIVE DISORDER (HCC): ICD-10-CM

## 2022-09-15 DIAGNOSIS — Z00.00 ANNUAL PHYSICAL EXAM: ICD-10-CM

## 2022-09-15 LAB
ALBUMIN SERPL BCP-MCNC: 4.2 G/DL (ref 3.5–5)
ALP SERPL-CCNC: 42 U/L (ref 34–104)
ALT SERPL W P-5'-P-CCNC: 11 U/L (ref 7–52)
ANION GAP SERPL CALCULATED.3IONS-SCNC: 5 MMOL/L (ref 4–13)
AST SERPL W P-5'-P-CCNC: 14 U/L (ref 13–39)
BASOPHILS # BLD AUTO: 0.03 THOUSANDS/ΜL (ref 0–0.1)
BASOPHILS NFR BLD AUTO: 1 % (ref 0–1)
BILIRUB SERPL-MCNC: 0.59 MG/DL (ref 0.2–1)
BUN SERPL-MCNC: 21 MG/DL (ref 5–25)
CALCIUM SERPL-MCNC: 9.1 MG/DL (ref 8.4–10.2)
CHLORIDE SERPL-SCNC: 104 MMOL/L (ref 96–108)
CHOLEST SERPL-MCNC: 273 MG/DL
CO2 SERPL-SCNC: 28 MMOL/L (ref 21–32)
CREAT SERPL-MCNC: 0.78 MG/DL (ref 0.6–1.3)
EOSINOPHIL # BLD AUTO: 0.09 THOUSAND/ΜL (ref 0–0.61)
EOSINOPHIL NFR BLD AUTO: 2 % (ref 0–6)
ERYTHROCYTE [DISTWIDTH] IN BLOOD BY AUTOMATED COUNT: 12.9 % (ref 11.6–15.1)
EST. AVERAGE GLUCOSE BLD GHB EST-MCNC: 111 MG/DL
GFR SERPL CREATININE-BSD FRML MDRD: 82 ML/MIN/1.73SQ M
GLUCOSE P FAST SERPL-MCNC: 97 MG/DL (ref 65–99)
HBA1C MFR BLD: 5.5 %
HCT VFR BLD AUTO: 39.2 % (ref 34.8–46.1)
HDLC SERPL-MCNC: 93 MG/DL
HGB BLD-MCNC: 13 G/DL (ref 11.5–15.4)
IMM GRANULOCYTES # BLD AUTO: 0.01 THOUSAND/UL (ref 0–0.2)
IMM GRANULOCYTES NFR BLD AUTO: 0 % (ref 0–2)
LDLC SERPL CALC-MCNC: 169 MG/DL (ref 0–100)
LYMPHOCYTES # BLD AUTO: 1.61 THOUSANDS/ΜL (ref 0.6–4.47)
LYMPHOCYTES NFR BLD AUTO: 35 % (ref 14–44)
MCH RBC QN AUTO: 31.2 PG (ref 26.8–34.3)
MCHC RBC AUTO-ENTMCNC: 33.2 G/DL (ref 31.4–37.4)
MCV RBC AUTO: 94 FL (ref 82–98)
MONOCYTES # BLD AUTO: 0.69 THOUSAND/ΜL (ref 0.17–1.22)
MONOCYTES NFR BLD AUTO: 15 % (ref 4–12)
NEUTROPHILS # BLD AUTO: 2.18 THOUSANDS/ΜL (ref 1.85–7.62)
NEUTS SEG NFR BLD AUTO: 47 % (ref 43–75)
NRBC BLD AUTO-RTO: 0 /100 WBCS
PLATELET # BLD AUTO: 208 THOUSANDS/UL (ref 149–390)
PMV BLD AUTO: 9.1 FL (ref 8.9–12.7)
POTASSIUM SERPL-SCNC: 4.3 MMOL/L (ref 3.5–5.3)
PROT SERPL-MCNC: 7 G/DL (ref 6.4–8.4)
RBC # BLD AUTO: 4.17 MILLION/UL (ref 3.81–5.12)
SODIUM SERPL-SCNC: 137 MMOL/L (ref 135–147)
TRIGL SERPL-MCNC: 56 MG/DL
TSH SERPL DL<=0.05 MIU/L-ACNC: 0.89 UIU/ML (ref 0.45–4.5)
WBC # BLD AUTO: 4.61 THOUSAND/UL (ref 4.31–10.16)

## 2022-09-15 PROCEDURE — 84443 ASSAY THYROID STIM HORMONE: CPT

## 2022-09-15 PROCEDURE — 80061 LIPID PANEL: CPT

## 2022-10-06 DIAGNOSIS — F33.0 MILD EPISODE OF RECURRENT MAJOR DEPRESSIVE DISORDER (HCC): ICD-10-CM

## 2022-10-06 RX ORDER — CITALOPRAM 40 MG/1
40 TABLET ORAL DAILY
Qty: 90 TABLET | Refills: 0 | Status: SHIPPED | OUTPATIENT
Start: 2022-10-06

## 2022-10-06 NOTE — TELEPHONE ENCOUNTER
Medication Refill Request     Name  citalopram (CeleXA) 40 mg tablet  Dose/Frequency TAKE ONE TABLET BY MOUTH EVERY DAY  Quantity 90 tablet  Verified pharmacy   [x]  Verified ordering Provider   [x]  Does patient have enough for the next 3 days?  Yes [x] No []

## 2022-10-19 ENCOUNTER — OFFICE VISIT (OUTPATIENT)
Dept: DERMATOLOGY | Facility: CLINIC | Age: 62
End: 2022-10-19
Payer: COMMERCIAL

## 2022-10-19 VITALS — HEIGHT: 64 IN | WEIGHT: 175 LBS | BODY MASS INDEX: 29.88 KG/M2 | TEMPERATURE: 98.2 F

## 2022-10-19 DIAGNOSIS — Z85.828 HISTORY OF BASAL CELL CANCER: ICD-10-CM

## 2022-10-19 DIAGNOSIS — L81.4 LENTIGO: ICD-10-CM

## 2022-10-19 DIAGNOSIS — L90.5 SCAR: ICD-10-CM

## 2022-10-19 DIAGNOSIS — L82.1 SEBORRHEIC KERATOSIS: ICD-10-CM

## 2022-10-19 DIAGNOSIS — D18.01 CHERRY ANGIOMA: ICD-10-CM

## 2022-10-19 DIAGNOSIS — Z85.820 HISTORY OF MELANOMA: ICD-10-CM

## 2022-10-19 DIAGNOSIS — D22.9 MULTIPLE MELANOCYTIC NEVI: Primary | ICD-10-CM

## 2022-10-19 DIAGNOSIS — L85.3 XEROSIS OF SKIN: ICD-10-CM

## 2022-10-19 DIAGNOSIS — B35.3 TINEA PEDIS OF RIGHT FOOT: ICD-10-CM

## 2022-10-19 PROCEDURE — 11900 INJECT SKIN LESIONS </W 7: CPT | Performed by: STUDENT IN AN ORGANIZED HEALTH CARE EDUCATION/TRAINING PROGRAM

## 2022-10-19 PROCEDURE — 88312 SPECIAL STAINS GROUP 1: CPT | Performed by: DERMATOLOGY

## 2022-10-19 PROCEDURE — 99214 OFFICE O/P EST MOD 30 MIN: CPT | Performed by: STUDENT IN AN ORGANIZED HEALTH CARE EDUCATION/TRAINING PROGRAM

## 2022-10-19 PROCEDURE — 88305 TISSUE EXAM BY PATHOLOGIST: CPT | Performed by: DERMATOLOGY

## 2022-10-19 NOTE — PATIENT INSTRUCTIONS
Assessment and Plan:  Based on a thorough discussion of this condition and the management approach to it (including a comprehensive discussion of the known risks, side effects and potential benefits of treatment), the patient (family) agrees to implement the following specific plan:  When outside we recommend using a wide brim hat, sunglasses, long sleeve and pants, sunscreen with SPF 34+ with reapplication every 2 hours, or SPF specific clothing   Benign, reassured  Annual skin check     Melanocytic Nevi  Melanocytic nevi ("moles") are tan or brown, raised or flat areas of the skin which have an increased number of melanocytes  Melanocytes are the cells in our body which make pigment and account for skin color  Some moles are present at birth (I e , "congenital nevi"), while others come up later in life (i e , "acquired nevi")  The sun can stimulate the body to make more moles  Sunburns are not the only thing that triggers more moles  Chronic sun exposure can do it too  Clinically distinguishing a healthy mole from melanoma may be difficult, even for experienced dermatologists  The "ABCDE's" of moles have been suggested as a means of helping to alert a person to a suspicious mole and the possible increased risk of melanoma  The suggestions for raising alert are as follows:    Asymmetry: Healthy moles tend to be symmetric, while melanomas are often asymmetric  Asymmetry means if you draw a line through the mole, the two halves do not match in color, size, shape, or surface texture  Asymmetry can be a result of rapid enlargement of a mole, the development of a raised area on a previously flat lesion, scaling, ulceration, bleeding or scabbing within the mole  Any mole that starts to demonstrate "asymmetry" should be examined promptly by a board certified dermatologist      Border: Healthy moles tend to have discrete, even borders    The border of a melanoma often blends into the normal skin and does not sharply delineate the mole from normal skin  Any mole that starts to demonstrate "uneven borders" should be examined promptly by a board certified dermatologist      Color: Healthy moles tend to be one color throughout  Melanomas tend to be made up of different colors ranging from dark black, blue, white, or red  Any mole that demonstrates a color change should be examined promptly by a board certified dermatologist      Diameter: Healthy moles tend to be smaller than 0 6 cm in size; an exception are "congenital nevi" that can be larger  Melanomas tend to grow and can often be greater than 0 6 cm (1/4 of an inch, or the size of a pencil eraser)  This is only a guideline, and many normal moles may be larger than 0 6 cm without being unhealthy  Any mole that starts to change in size (small to bigger or bigger to smaller) should be examined promptly by a board certified dermatologist      Evolving: Healthy moles tend to "stay the same "  Melanomas may often show signs of change or evolution such as a change in size, shape, color, or elevation  Any mole that starts to itch, bleed, crust, burn, hurt, or ulcerate or demonstrate a change or evolution should be examined promptly by a board certified dermatologist       Assessment and Plan:  Based on a thorough discussion of this condition and the management approach to it (including a comprehensive discussion of the known risks, side effects and potential benefits of treatment), the patient (family) agrees to implement the following specific plan:  When outside we recommend using a wide brim hat, sunglasses, long sleeve and pants, sunscreen with SPF 49+ with reapplication every 2 hours, or SPF specific clothing       What is a lentigo? A lentigo is a pigmented flat or slightly raised lesion with a clearly defined edge  Unlike an ephelis (freckle), it does not fade in the winter months  There are several kinds of lentigo    The name lentigo originally referred to its appearance resembling a small lentil  The plural of lentigo is lentigines, although “lentigos” is also in common use  Who gets lentigines? Lentigines can affect males and females of all ages and races  Solar lentigines are especially prevalent in fair skinned adults  Lentigines associated with syndromes are present at birth or arise during childhood  What causes lentigines? Common forms of lentigo are due to exposure to ultraviolet radiation:  Sun damage including sunburn   Indoor tanning   Phototherapy, especially photochemotherapy (PUVA)    Ionizing radiation, eg radiation therapy, can also cause lentigines  Several familial syndromes associated with widespread lentigines originate from mutations in Reuben-MAP kinase, mTOR signaling and PTEN pathways  What is the treatment for lentigines? Most lentigines are left alone  Attempts to lighten them may not be successful  The following approaches are used:  SPF 50+ broad-spectrum sunscreen   Hydroquinone bleaching cream   Alpha hydroxy acids   Vitamin C   Retinoids   Azelaic acid   Chemical peels  Individual lesions can be permanently removed using:  Cryotherapy   Intense pulsed light   Pigment lasers    How can lentigines be prevented? Lentigines associated with exposure ultraviolet radiation can be prevented by very careful sun protection  Clothing is more successful at preventing new lentigines than are sunscreens  What is the outlook for lentigines? Lentigines usually persist  They may increase in number with age and sun exposure  Some in sun-protected sites may fade and disappear    Assessment and Plan:  Based on a thorough discussion of this condition and the management approach to it (including a comprehensive discussion of the known risks, side effects and potential benefits of treatment), the patient (family) agrees to implement the following specific plan:  Monitor for changes  Benign, reassured      Assessment and Plan:    Cherry angioma, also known as [employer] spots, are benign vascular skin lesions  A "cherry angioma" is a firm red, blue or purple papule, 0 1-1 cm in diameter  When thrombosed, they can appear black in colour until evaluated with a dermatoscope when the red or purple colour is more easily seen  Cherry angioma may develop on any part of the body but most often appear on the scalp, face, lips and trunk  An angioma is due to proliferating endothelial cells; these are the cells that line the inside of a blood vessel  Angiomas can arise in early life or later in life; the most common type of angioma is a cherry angioma  Cherry angiomas are very common in males and females of any age or race  They are more noticeable in white skin than in skin of colour  They markedly increase in number from about the age of 36  There may be a family history of similar lesions  Eruptive cherry angiomas have been rarely reported to be associated with internal malignancy  The cause of angiomas is unknown  Genetic analysis of cherry angiomas has shown that they frequently carry specific somatic missense mutations in the GNAQ and GNA11 (Q209H) genes, which are involved in other vascular and melanocytic proliferations  Assessment and Plan:  Based on a thorough discussion of this condition and the management approach to it (including a comprehensive discussion of the known risks, side effects and potential benefits of treatment), the patient (family) agrees to implement the following specific plan:  Monitor for changes  Benign, reassured      Seborrheic Keratosis  A seborrheic keratosis is a harmless warty spot that appears during adult life as a common sign of skin aging  Seborrheic keratoses can arise on any area of skin, covered or uncovered, with the usual exception of the palms and soles  They do not arise from mucous membranes  Seborrheic keratoses can have highly variable appearance  Seborrheic keratoses are extremely common   It has been estimated that over 90% of adults over the age of 61 years have one or more of them  They occur in males and females of all races, typically beginning to erupt in the 35s or 45s  They are uncommon under the age of 21 years  The precise cause of seborrhoeic keratoses is not known  Seborrhoeic keratoses are considered degenerative in nature  As time goes by, seborrheic keratoses tend to become more numerous  Some people inherit a tendency to develop a very large number of them; some people may have hundreds of them  There is no easy way to remove multiple lesions on a single occasion  Unless a specific lesion is "inflamed" and is causing pain or stinging/burning or is bleeding, most insurance companies do not authorize treatment  Assessment and Plan:  Based on a thorough discussion of this condition and the management approach to it (including a comprehensive discussion of the known risks, side effects and potential benefits of treatment), the patient (family) agrees to implement the following specific plan:  Use moisturizer like Eucerin,Cerave or Aveeno Cream 3 times a day for the dry skin            Dry skin refers to skin that feels dry to touch  Dry skin has a dull surface with a rough, scaly quality  The skin is less pliable and cracked  When dryness is severe, the skin may become inflamed and fissured  Although any body site can be dry, dry skin tends to affect the shins more than any other site  Dry skin is lacking moisture in the outer horny cell layer (stratum corneum) and this results in cracks in the skin surface  Dry skin is also called xerosis, xeroderma or asteatosis (lack of fat)  It can affect males and females of all ages  There is some racial variability in water and lipid content of the skin  Dry skin that starts in early childhood may be one of about 20 types of ichthyosis (fish-scale skin)  There is often a family history of dry skin     Dry skin is commonly seen in people with atopic dermatitis  Nearly everyone > 60 years has dry skin  Dry skin that begins later may be seen in people with certain diseases and conditions  Postmenopausal women  Hypothyroidism  Chronic renal disease   Malnutrition and weight loss   Subclinical dermatitis   Treatment with certain drugs such as oral retinoids, diuretics and epidermal growth factor receptor inhibitors      What is the treatment for dry skin? The mainstay of treatment of dry skin and ichthyosis is moisturisers/emollients  They should be applied liberally and often enough to:  Reduce itch   Improve the barrier function   Prevent entry of irritants, bacteria   Reduce transepidermal water loss  How can dry skin be prevented? Eliminate aggravating factors:  Reduce the frequency of bathing  A humidifier in winter and air conditioner in summer   Compare having a short shower with a prolonged soak in a bath  Use lukewarm, not hot, water  Replace standard soap with a substitute such as a synthetic detergent cleanser, water-miscible emollient, bath oil, anti-pruritic tar oil, colloidal oatmeal etc    Apply an emollient liberally and often, particularly shortly after bathing, and when itchy  The drier the skin, the thicker this should be, especially on the hands  What is the outlook for dry skin? A tendency to dry skin may persist life-long, or it may improve once contributing factors are controlled  Assessment and Plan:  Based on a thorough discussion of this condition and the management approach to it (including a comprehensive discussion of the known risks, side effects and potential benefits of treatment), the patient (family) agrees to implement the following specific plan:  Monitor for any changes  When outside we recommend using a wide brim hat, sunglasses, long sleeve and pants, sunscreen with SPF 31+ with reapplication every 2 hours, or SPF specific clothing     What happens at follow-up?   The main purpose of follow-up is to detect recurrences early (metastatic melanoma), but it also offers an opportunity to diagnose a new primary melanoma at the first possible opportunity  A second invasive melanoma occurs in 5-10% of melanoma patients and a new melanoma in situ is diagnosed in more than 20% of melanoma patients  Our practice makes the following recommendations for follow-up for patients with invasive melanoma  At-least "monthly" self-skin examinations   Routine skin checks by a board certified dermatologist  Follow-up intervals are "every 3 months" within 2 years of a new melanoma diagnosis; "every 6 months" between 2-4 years of a new melanoma diagnosis; and "annually" after 4 years of a new melanoma diagnosis  Individual patient's needs should be considered before an appropriate follow-up is offered  Provide education and support to help the patient adjust to their illness    Follow-up appointments should include:  A check of the scar where the primary melanoma was removed  Checking the regional lymph nodes  A general skin examination  A full physical examination at least annually by your primary care physician    In those with more advanced primary disease, follow-up may include:  Blood tests  Imaging: ultrasound, X-ray, CT, MRI and PET scan  Most tests are not worthwhile for patients with stage 1 or 2 melanoma unless there are signs or symptoms of disease recurrence or metastasis  No tests are necessary for healthy patients who have remained well for five years or longer after removal of their melanoma  What is the outlook for patients with melanoma? Melanoma in situ is cured by excision because it has no potential to spread around the body  The risk of spread and ultimate death from invasive melanoma depends on several factors, but the main one is the Breslow thickness of the melanoma at the time it was surgically removed    Metastases are rare for melanomas < 0 75 mm and the risk for tumours 0 75-1 mm thick is about 5%  The risk steadily increases with thickness so that melanomas > 4 mm have a risk of metastasis of about 40%  Melanoma is a potentially serious type of skin cancer, in which there is uncontrolled growth of melanocytes (pigment cells)  Melanoma is sometimes called malignant melanoma  Normal melanocytes are found in the basal layer of the epidermis (the outer layer of skin)  Melanocytes produce a protein called melanin, which protects skin cells by absorbing ultraviolet (UV) radiation  Melanocytes are found in equal numbers in black and white skin, but melanocytes in black skin produce much more melanin  People with dark brown or black skin are very much less likely to be damaged by UV radiation than those with white skin  Assessment and Plan:  Based on a thorough discussion of this condition and the management approach to it (including a comprehensive discussion of the known risks, side effects and potential benefits of treatment), the patient (family) agrees to implement the following specific plan:  Monitor for any changes   When outside we recommend using a wide brim hat, sunglasses, long sleeve and pants, sunscreen with SPF 84+ with reapplication every 2 hours, or SPF specific clothing     How can basal cell carcinoma be prevented? The most important way to prevent BCC is to avoid sunburn  This is especially important in childhood and early life  Fair skinned individuals and those with a personal or family history of BCC should protect their skin from sun exposure daily, year-round and lifelong  Stay indoors or under the shade in the middle of the day   Wear covering clothing   Apply high protection factor SPF50+ broad-spectrum sunscreens generously to exposed skin if outdoors   Avoid indoor tanning (sun beds, solaria)  Oral nicotinamide (vitamin B3) in a dose of 500 mg twice daily may reduce the number and severity of BCCs  What is the outlook for basal cell carcinoma?   Most BCCs are cured by treatment  Cure is most likely if treatment is undertaken when the lesion is small  About 50% of people with BCC develop a second one within 3 years of the first  They are also at increased risk of other skin cancers, especially melanoma  Regular self-skin examinations and long-term annual skin checks by an experienced health professional are recommended  Assessment and Plan:  Based on a thorough discussion of this condition and the management approach to it (including a comprehensive discussion of the known risks, side effects and potential benefits of treatment), the patient (family) agrees to implement the following specific plan:  ciclopirox cream 0 77% twice a day for three weeks to right foot   Nail clipping done in office office today to rule out fungus     Tinea Pedis  Tinea pedis is a fungal infection of the foot and is in fact the most common fungal infection  Tinea pedis is caused by dermatophyte fungi with the three most common being Trichophyton (T ) rubrum, T  interdigitale and Epidermophyton floccosum  Tinea pedis most commonly involves the interdigital spaces, known as "athlete's foot " Other typical sites include the toenails, groin, and palms of the hands  There are four major manifestations of tinea pedis including chronic hyperkeratotic, chronic intertriginous, acute ulcerative and vesicobullous  Signs and symptoms include:   Itchiness, redness, and scaling between the toes  Scales covering the soles and sides of the feet  Blisters over the inner aspect of the feet    It is particularly common in hot, tropical, and urban environments where sweating in the feet facilitate fungal growth  Risk factors for development include:  Occlusive footwear  Excessive swearing  Diabetes or other underlying immunosuppression   Poor peripheral circulation     The diagnosis of tinea pedis can usually be made via good history and physical exam due to its characteristic clinical features   Diagnosis can be confirmed by examining skin scrapings under the microscope  Cultures are occasionally done but may not be necessary if fungi are seen under microscopy  Other diagnoses to consider if patients do not respond to therapy include psoriasis, contact dermatitis, and eczema  Tinea pedis can be treated with topical antifungal drugs applied to affected areas on a repeated basis (usually 2 twice a day) for 2 to 4 weeks  Common topical medications include topical ketoconazole, allylamines, butenafine, ciclopirox, and tolnaftate  In cases that do not respond to topical therapy, oral antifungal agents may be used which include terbinafine, itraconazole, fluconazole and griseofulvin  These oral agents are also used to treat tinea capitis (fungal infection of the scalp) and onychomycosis (fungal infection of the nails)  Those with pre-existing liver problems are usually screened for liver function prior to starting oral terbinafine  Tinea pedis can be prevented by making sure feet are clean and dry with protective footwear worn in communal facilities  Other recommendations are:  Using drying foot powders when wearing occlusive shoes   Thoroughly dry shoes and boots prior to wearing them   Making sure to clean contaminated bathroom floors with bleach   Treatment of family members and other close contacts      Purpose: Triamcinolone is a synthetic glucocorticoid corticosteroid that has marked anti-inflammatory action  It is prepared in sterile aqueous suspension suitable for injecting directly into a lesion on or immediately below the skin to treat a dermal inflammatory process  Indications: It is indicated for alopecia areata; inflammatory acne cysts; discoid lupus erythematosus; keloids and hypertrophic scars; inflammatory lesions of granuloma annulare, lichen planus, lichen simplex chronicus (neurodermatitis), psoriatic plaques, and other localized inflammatory skin conditions  Potential Side Effects:  I understand that triamcinolone injection can potentially cause early and/or delayed adverse effects such as:    Pain    Impaired wound healing    Increased hair growth    Bleeding    White or brown marks    Steroid acne    Infection    Telangiectasia    Skin thinning    Cutaneous and subcutaneous lipoatrophy (most common) appearing as skin indentations or dimples around the injection sites a few weeks after treatment

## 2022-10-19 NOTE — PROGRESS NOTES
[facility] Follow Up Note    Patient Name: Teresa Westbrook  Encounter Date: 10/19/2022    Today's Chief Concerns:  • Concern #1:  Skin check       Current Medications:    Current Outpatient Medications:   •  amoxicillin (AMOXIL) 500 mg capsule, Take 500 mg by mouth in the morning Took once prior to today's procedure   Hip surgeon recommended it , Disp: , Rfl:   •  CALCIUM PO, Take 600 mg by mouth in the morning, Disp: , Rfl:   •  citalopram (CeleXA) 40 mg tablet, Take 1 tablet (40 mg total) by mouth daily, Disp: 90 tablet, Rfl: 0  •  Multiple Vitamin (MULTI-VITAMIN DAILY PO), , Disp: , Rfl:   •  pramipexole (MIRAPEX) 0 25 mg tablet, 1 1/2 tabs daily, Disp: 135 tablet, Rfl: 0    CONSTITUTIONAL:   Vitals:    10/19/22 1006   Temp: 98 2 °F (36 8 °C)   Weight: 79 4 kg (175 lb)   Height: 5' 4" (1 626 m)         Specific Alerts:    Have you been seen by a [facility] Dermatologist in the last 3 years? YES    Are you pregnant or planning to become pregnant? No    Are you currently or planning to be nursing or breast feeding? No    Allergies   Allergen Reactions   • Monistat [Tioconazole] Other (See Comments)     Severe vaginal burning   • Benzoin Rash   • Meloxicam Rash   • Nickel Itching and Rash       May we call your Preferred Phone number to discuss your specific medical information? YES    May we leave a detailed message that includes your specific medical information? YES    Have you traveled outside of the [facility] in the past 3 months? No    Do you currently have a pacemaker or defibrillator? No    Do you have any artificial heart valves, joints, plates, screws, rods, stents, pins, etc? YES   - If Yes, were any placed within the last 2 years? Yes 12/21    Do you require any medications prior to a surgical procedure? YES   - If Yes, for which procedure? Dentist, colonoscopy    - If Yes, what medications to you require?  Amoxicillin     Are you taking any medications that cause you to bleed more easily ("blood thinners") YES    Have you ever experienced a rapid heartbeat with epinephrine? No        Review of Systems:  Have you recently had or currently have any of the following? · Fever or chills: No  · Night Sweats: No  · Headaches: No  · Weight Gain: No  · Weight Loss: No  · Blurry Vision: No  · Nausea: No  · Vomiting: No  · Diarrhea: No  · Blood in Stool: No  · Abdominal Pain: No  · Itchy Skin: No  · Painful Joints: No  · Swollen Joints: No  · Muscle Pain: No  · Irregular Mole: No  · Sun Burn: No  · Dry Skin: No  · Skin Color Changes: No  · Scar or Keloid: No  · Cold Sores/Fever Blisters: No  · Bacterial Infections/MRSA: No  · Anxiety: YES  · Depression: YES  · Suicidal or Homicidal Thoughts: No      PSYCH: Normal mood and affect  EYES: Normal conjunctiva  ENT: Normal lips and oral mucosa  CARDIOVASCULAR: No edema  RESPIRATORY: Normal respirations      FULL ORGAN SYSTEM SKIN EXAM (SKIN)   Hair, Scalp, Ears, Face Normal except as noted below in Assessment   Neck Normal except as noted below in Assessment   Right Arm/Hand/Fingers Normal except as noted below in Assessment   Left Arm/Hand/Fingers Normal except as noted below in Assessment   Chest/Axillae Viewed areas Normal except as noted below in Assessment (pt deferred breast exam)   Abdomen, Umbilicus Normal except as noted below in Assessment   Back/Spine Normal except as noted below in Assessment   Groin/Genitalia/Buttocks Pt  deferred   Right Leg, Foot, Toes Normal except as noted below in Assessment   Left Leg, Foot, Toes Normal except as noted below in Assessment       MELANOCYTIC NEVI ("Moles")    Physical Exam:  • Anatomic Location Affected: head,neck, trunk and extremities  • Morphological Description:  Scattered, 1-4mm round to ovoid, symmetrical-appearing, even bordered, skin colored to dark brown macules/papules, mostly in sun-exposed areas  • Pertinent Positives:  • Pertinent Negatives:     Additional History of Present Condition:      Assessment and Plan:  Based on a thorough discussion of this condition and the management approach to it (including a comprehensive discussion of the known risks, side effects and potential benefits of treatment), the patient (family) agrees to implement the following specific plan:  • When outside we recommend using a wide brim hat, sunglasses, long sleeve and pants, sunscreen with SPF 42+ with reapplication every 2 hours, or SPF specific clothing   • Benign, reassured  • Annual skin check     Melanocytic Nevi  Melanocytic nevi ("moles") are tan or brown, raised or flat areas of the skin which have an increased number of melanocytes  Melanocytes are the cells in our body which make pigment and account for skin color  Some moles are present at birth (I e , "congenital nevi"), while others come up later in life (i e , "acquired nevi")  The sun can stimulate the body to make more moles  Sunburns are not the only thing that triggers more moles  Chronic sun exposure can do it too  Clinically distinguishing a healthy mole from melanoma may be difficult, even for experienced dermatologists  The "ABCDE's" of moles have been suggested as a means of helping to alert a person to a suspicious mole and the possible increased risk of melanoma  The suggestions for raising alert are as follows:    Asymmetry: Healthy moles tend to be symmetric, while melanomas are often asymmetric  Asymmetry means if you draw a line through the mole, the two halves do not match in color, size, shape, or surface texture  Asymmetry can be a result of rapid enlargement of a mole, the development of a raised area on a previously flat lesion, scaling, ulceration, bleeding or scabbing within the mole  Any mole that starts to demonstrate "asymmetry" should be examined promptly by a board certified dermatologist      Border: Healthy moles tend to have discrete, even borders    The border of a melanoma often blends into the normal skin and does not sharply delineate the mole from normal skin  Any mole that starts to demonstrate "uneven borders" should be examined promptly by a board certified dermatologist      Color: Healthy moles tend to be one color throughout  Melanomas tend to be made up of different colors ranging from dark black, blue, white, or red  Any mole that demonstrates a color change should be examined promptly by a board certified dermatologist      Diameter: Healthy moles tend to be smaller than 0 6 cm in size; an exception are "congenital nevi" that can be larger  Melanomas tend to grow and can often be greater than 0 6 cm (1/4 of an inch, or the size of a pencil eraser)  This is only a guideline, and many normal moles may be larger than 0 6 cm without being unhealthy  Any mole that starts to change in size (small to bigger or bigger to smaller) should be examined promptly by a board certified dermatologist      Evolving: Healthy moles tend to "stay the same "  Melanomas may often show signs of change or evolution such as a change in size, shape, color, or elevation  Any mole that starts to itch, bleed, crust, burn, hurt, or ulcerate or demonstrate a change or evolution should be examined promptly by a board certified dermatologist         LENTIGO    Physical Exam:  • Anatomic Location Affected:  Trunk and arms   • Morphological Description:  Light brown macules  • Pertinent Positives:  • Pertinent Negatives:     Additional History of Present Condition:      Assessment and Plan:  Based on a thorough discussion of this condition and the management approach to it (including a comprehensive discussion of the known risks, side effects and potential benefits of treatment), the patient (family) agrees to implement the following specific plan:  • When outside we recommend using a wide brim hat, sunglasses, long sleeve and pants, sunscreen with SPF 89+ with reapplication every 2 hours, or SPF specific clothing       What is a lentigo? A lentigo is a pigmented flat or slightly raised lesion with a clearly defined edge  Unlike an ephelis (freckle), it does not fade in the winter months  There are several kinds of lentigo  The name lentigo originally referred to its appearance resembling a small lentil  The plural of lentigo is lentigines, although “lentigos” is also in common use  Who gets lentigines? Lentigines can affect males and females of all ages and races  Solar lentigines are especially prevalent in fair skinned adults  Lentigines associated with syndromes are present at birth or arise during childhood  What causes lentigines? Common forms of lentigo are due to exposure to ultraviolet radiation:  • Sun damage including sunburn   • Indoor tanning   • Phototherapy, especially photochemotherapy (PUVA)    Ionizing radiation, eg radiation therapy, can also cause lentigines  Several familial syndromes associated with widespread lentigines originate from mutations in Reuben-MAP kinase, mTOR signaling and PTEN pathways  What is the treatment for lentigines? Most lentigines are left alone  Attempts to lighten them may not be successful  The following approaches are used:  • SPF 50+ broad-spectrum sunscreen   • Hydroquinone bleaching cream   • Alpha hydroxy acids   • Vitamin C   • Retinoids   • Azelaic acid   • Chemical peels  Individual lesions can be permanently removed using:  • Cryotherapy   • Intense pulsed light   • Pigment lasers    How can lentigines be prevented? Lentigines associated with exposure ultraviolet radiation can be prevented by very careful sun protection  Clothing is more successful at preventing new lentigines than are sunscreens  What is the outlook for lentigines? Lentigines usually persist  They may increase in number with age and sun exposure  Some in sun-protected sites may fade and disappear      BATISTA ANGIOMAS    Physical Exam:  • Anatomic Location Affected:  trunk  • Morphological Description:  Scattered cherry red, 1-4 mm papules  • Pertinent Positives:  • Pertinent Negatives: Additional History of Present Condition:      Assessment and Plan:  Based on a thorough discussion of this condition and the management approach to it (including a comprehensive discussion of the known risks, side effects and potential benefits of treatment), the patient (family) agrees to implement the following specific plan:  • Monitor for changes  • Benign, reassured  •     Assessment and Plan:    Cherry angioma, also known as Elzie Ric spots, are benign vascular skin lesions  A "cherry angioma" is a firm red, blue or purple papule, 0 1-1 cm in diameter  When thrombosed, they can appear black in colour until evaluated with a dermatoscope when the red or purple colour is more easily seen  Cherry angioma may develop on any part of the body but most often appear on the scalp, face, lips and trunk  An angioma is due to proliferating endothelial cells; these are the cells that line the inside of a blood vessel  Angiomas can arise in early life or later in life; the most common type of angioma is a cherry angioma  Cherry angiomas are very common in males and females of any age or race  They are more noticeable in white skin than in skin of colour  They markedly increase in number from about the age of 36  There may be a family history of similar lesions  Eruptive cherry angiomas have been rarely reported to be associated with internal malignancy  The cause of angiomas is unknown  Genetic analysis of cherry angiomas has shown that they frequently carry specific somatic missense mutations in the GNAQ and GNA11 (Q209H) genes, which are involved in other vascular and melanocytic proliferations        SEBORRHEIC KERATOSIS; NON-INFLAMED    Physical Exam:  • Anatomic Location Affected:  trunk  • Morphological Description:  Flat and raised, waxy, smooth to warty textured, yellow to brownish-grey to dark brown to blackish, discrete, "stuck-on" appearing papules  • Pertinent Positives:  • Pertinent Negatives: Additional History of Present Condition:      Assessment and Plan:  Based on a thorough discussion of this condition and the management approach to it (including a comprehensive discussion of the known risks, side effects and potential benefits of treatment), the patient (family) agrees to implement the following specific plan:  • Monitor for changes  • Benign, reassured  •     Seborrheic Keratosis  A seborrheic keratosis is a harmless warty spot that appears during adult life as a common sign of skin aging  Seborrheic keratoses can arise on any area of skin, covered or uncovered, with the usual exception of the palms and soles  They do not arise from mucous membranes  Seborrheic keratoses can have highly variable appearance  Seborrheic keratoses are extremely common  It has been estimated that over 90% of adults over the age of 61 years have one or more of them  They occur in males and females of all races, typically beginning to erupt in the 35s or 45s  They are uncommon under the age of 21 years  The precise cause of seborrhoeic keratoses is not known  Seborrhoeic keratoses are considered degenerative in nature  As time goes by, seborrheic keratoses tend to become more numerous  Some people inherit a tendency to develop a very large number of them; some people may have hundreds of them  There is no easy way to remove multiple lesions on a single occasion  Unless a specific lesion is "inflamed" and is causing pain or stinging/burning or is bleeding, most insurance companies do not authorize treatment  XEROSIS ("DRY SKIN")    Physical Exam:  • Anatomic Location Affected:  diffuse  • Morphological Description:  xerosis  • Pertinent Positives:  • Pertinent Negatives:     Additional History of Present Condition:      Assessment and Plan:  Based on a thorough discussion of this condition and the management approach to it (including a comprehensive discussion of the known risks, side effects and potential benefits of treatment), the patient (family) agrees to implement the following specific plan:  • Use moisturizer like Eucerin,Cerave or Aveeno Cream 3 times a day for the dry skin   •   •    •     Dry skin refers to skin that feels dry to touch  Dry skin has a dull surface with a rough, scaly quality  The skin is less pliable and cracked  When dryness is severe, the skin may become inflamed and fissured  Although any body site can be dry, dry skin tends to affect the shins more than any other site  Dry skin is lacking moisture in the outer horny cell layer (stratum corneum) and this results in cracks in the skin surface  Dry skin is also called xerosis, xeroderma or asteatosis (lack of fat)  It can affect males and females of all ages  There is some racial variability in water and lipid content of the skin  • Dry skin that starts in early childhood may be one of about 20 types of ichthyosis (fish-scale skin)  There is often a family history of dry skin  • Dry skin is commonly seen in people with atopic dermatitis  • Nearly everyone > 60 years has dry skin  Dry skin that begins later may be seen in people with certain diseases and conditions  • Postmenopausal women  • Hypothyroidism  • Chronic renal disease   • Malnutrition and weight loss   • Subclinical dermatitis   • Treatment with certain drugs such as oral retinoids, diuretics and epidermal growth factor receptor inhibitors      What is the treatment for dry skin? The mainstay of treatment of dry skin and ichthyosis is moisturisers/emollients  They should be applied liberally and often enough to:  • Reduce itch   • Improve the barrier function   • Prevent entry of irritants, bacteria   • Reduce transepidermal water loss  How can dry skin be prevented? Eliminate aggravating factors:  • Reduce the frequency of bathing     • A humidifier in winter and air conditioner in summer   • Compare having a short shower with a prolonged soak in a bath  • Use lukewarm, not hot, water  • Replace standard soap with a substitute such as a synthetic detergent cleanser, water-miscible emollient, bath oil, anti-pruritic tar oil, colloidal oatmeal etc    • Apply an emollient liberally and often, particularly shortly after bathing, and when itchy  The drier the skin, the thicker this should be, especially on the hands  What is the outlook for dry skin? A tendency to dry skin may persist life-long, or it may improve once contributing factors are controlled  HISTORY OF MELANOMA    Physical Exam:  • Anatomic Location Affected:  Left upper arm   • Morphological Description of Scar:  Well healed   • Year Treated: 2018  • TNM Classification:   • Suspected Recurrence: no  • Regional adenopathy: no    Additional History of Present Condition:      Assessment and Plan:  Based on a thorough discussion of this condition and the management approach to it (including a comprehensive discussion of the known risks, side effects and potential benefits of treatment), the patient (family) agrees to implement the following specific plan:  • Monitor for any changes  • When outside we recommend using a wide brim hat, sunglasses, long sleeve and pants, sunscreen with SPF 27+ with reapplication every 2 hours, or SPF specific clothing     What happens at follow-up? The main purpose of follow-up is to detect recurrences early (metastatic melanoma), but it also offers an opportunity to diagnose a new primary melanoma at the first possible opportunity  A second invasive melanoma occurs in 5-10% of melanoma patients and a new melanoma in situ is diagnosed in more than 20% of melanoma patients  Our practice makes the following recommendations for follow-up for patients with invasive melanoma    • At-least "monthly" self-skin examinations   • Routine skin checks by a board certified dermatologist  • Follow-up intervals are "every 3 months" within 2 years of a new melanoma diagnosis; "every 6 months" between 2-4 years of a new melanoma diagnosis; and "annually" after 4 years of a new melanoma diagnosis  • Individual patient's needs should be considered before an appropriate follow-up is offered  • Provide education and support to help the patient adjust to their illness    Follow-up appointments should include:  • A check of the scar where the primary melanoma was removed  • Checking the regional lymph nodes  • A general skin examination  • A full physical examination at least annually by your primary care physician    In those with more advanced primary disease, follow-up may include:  • Blood tests  • Imaging: ultrasound, X-ray, CT, MRI and PET scan  Most tests are not worthwhile for patients with stage 1 or 2 melanoma unless there are signs or symptoms of disease recurrence or metastasis  No tests are necessary for healthy patients who have remained well for five years or longer after removal of their melanoma  What is the outlook for patients with melanoma? • Melanoma in situ is cured by excision because it has no potential to spread around the body  • The risk of spread and ultimate death from invasive melanoma depends on several factors, but the main one is the Breslow thickness of the melanoma at the time it was surgically removed  • Metastases are rare for melanomas < 0 75 mm and the risk for tumours 0 75-1 mm thick is about 5%  The risk steadily increases with thickness so that melanomas > 4 mm have a risk of metastasis of about 40%  Melanoma is a potentially serious type of skin cancer, in which there is uncontrolled growth of melanocytes (pigment cells)  Melanoma is sometimes called malignant melanoma  Normal melanocytes are found in the basal layer of the epidermis (the outer layer of skin)   Melanocytes produce a protein called melanin, which protects skin cells by absorbing ultraviolet (UV) radiation  Melanocytes are found in equal numbers in black and white skin, but melanocytes in black skin produce much more melanin  People with dark brown or black skin are very much less likely to be damaged by UV radiation than those with white skin  HISTORY OF BASAL CELL CARCINOMA    Physical Exam:  • Anatomic Location Affected:  Right shoulder   • Morphological Description of scar:  Well healed   • Suspected Recurrence: No  • Pertinent Positives:  • Pertinent Negatives: Additional History of Present Condition:  History of basal cell carcinoma with no sign of recurrence    Assessment and Plan:  Based on a thorough discussion of this condition and the management approach to it (including a comprehensive discussion of the known risks, side effects and potential benefits of treatment), the patient (family) agrees to implement the following specific plan:  • Monitor for any changes   • When outside we recommend using a wide brim hat, sunglasses, long sleeve and pants, sunscreen with SPF 47+ with reapplication every 2 hours, or SPF specific clothing     How can basal cell carcinoma be prevented? The most important way to prevent BCC is to avoid sunburn  This is especially important in childhood and early life  Fair skinned individuals and those with a personal or family history of BCC should protect their skin from sun exposure daily, year-round and lifelong  • Stay indoors or under the shade in the middle of the day   • Wear covering clothing   • Apply high protection factor SPF50+ broad-spectrum sunscreens generously to exposed skin if outdoors   • Avoid indoor tanning (sun beds, solaria)  • Oral nicotinamide (vitamin B3) in a dose of 500 mg twice daily may reduce the number and severity of BCCs  What is the outlook for basal cell carcinoma? Most BCCs are cured by treatment  Cure is most likely if treatment is undertaken when the lesion is small    About 50% of people with BCC develop a second one within 3 years of the first  They are also at increased risk of other skin cancers, especially melanoma  Regular self-skin examinations and long-term annual skin checks by an experienced health professional are recommended  TINEA PEDIS ("ATHLETE'S FOOT")    Physical Exam:  • Anatomic Location Affected:   right plantar foot   • Morphological Description:  scale  • Pertinent Positives:  • Pertinent Negatives: Additional History of Present Condition:      Assessment and Plan:  Based on a thorough discussion of this condition and the management approach to it (including a comprehensive discussion of the known risks, side effects and potential benefits of treatment), the patient (family) agrees to implement the following specific plan:  • ciclopirox cream 0 77% twice a day for three weeks to right foot   • Nail clipping done in office office today to rule out fungus  If +, order labs and if wnl, order terbinafine    Tinea Pedis  Tinea pedis is a fungal infection of the foot and is in fact the most common fungal infection  Tinea pedis is caused by dermatophyte fungi with the three most common being Trichophyton (T ) rubrum, T  interdigitale and Epidermophyton floccosum  Tinea pedis most commonly involves the interdigital spaces, known as "athlete's foot " Other typical sites include the toenails, groin, and palms of the hands  There are four major manifestations of tinea pedis including chronic hyperkeratotic, chronic intertriginous, acute ulcerative and vesicobullous  Signs and symptoms include:   • Itchiness, redness, and scaling between the toes  • Scales covering the soles and sides of the feet  • Blisters over the inner aspect of the feet    It is particularly common in hot, tropical, and urban environments where sweating in the feet facilitate fungal growth   Risk factors for development include:  • Occlusive footwear  • Excessive swearing  • Diabetes or other underlying immunosuppression   • Poor peripheral circulation     The diagnosis of tinea pedis can usually be made via good history and physical exam due to its characteristic clinical features  Diagnosis can be confirmed by examining skin scrapings under the microscope  Cultures are occasionally done but may not be necessary if fungi are seen under microscopy  Other diagnoses to consider if patients do not respond to therapy include psoriasis, contact dermatitis, and eczema  Tinea pedis can be treated with topical antifungal drugs applied to affected areas on a repeated basis (usually 2 twice a day) for 2 to 4 weeks  Common topical medications include topical ketoconazole, allylamines, butenafine, ciclopirox, and tolnaftate  In cases that do not respond to topical therapy, oral antifungal agents may be used which include terbinafine, itraconazole, fluconazole and griseofulvin  These oral agents are also used to treat tinea capitis (fungal infection of the scalp) and onychomycosis (fungal infection of the nails)  Those with pre-existing liver problems are usually screened for liver function prior to starting oral terbinafine  Tinea pedis can be prevented by making sure feet are clean and dry with protective footwear worn in communal facilities  Other recommendations are:  • Using drying foot powders when wearing occlusive shoes   • Thoroughly dry shoes and boots prior to wearing them   • Making sure to clean contaminated bathroom floors with bleach   • Treatment of family members and other close contacts      Scar due to previous skin cancer surgery--> PAINFUL, HYPERTROPHIC SCAR  Physical Exam:  • Anatomic Location Affected:  Right upper back  • Morphological Description:  Well healed linear scar   • Pertinent Positives:  • Pertinent Negatives:     Additional History of Present Condition:  Patient had excision in April 2022 for basal cell on her right shoulder, patient states at time scar does itch,  Burn, and painful to touch      Assessment and Plan:  Based on a thorough discussion of this condition and the management approach to it (including a comprehensive discussion of the known risks, side effects and potential benefits of treatment), the patient (family) agrees to implement the following specific plan:  • Kenalog injection done in office today  Discussed risks and obtained written consent    PROCEDURE:  INTRALESIONAL STEROID INJECTION (KENALOG INJECTION)    Purpose: Triamcinolone is a synthetic glucocorticoid corticosteroid that has marked anti-inflammatory action  It is prepared in sterile aqueous suspension suitable for injecting directly into a lesion on or immediately below the skin to treat a dermal inflammatory process  Indications: It is indicated for alopecia areata; inflammatory acne cysts; discoid lupus erythematosus; keloids and hypertrophic scars; inflammatory lesions of granuloma annulare, lichen planus, lichen simplex chronicus (neurodermatitis), psoriatic plaques, and other localized inflammatory skin conditions  Potential Side Effects: I understand that triamcinolone injection can potentially cause early and/or delayed adverse effects such as:   • Pain   • Impaired wound healing   • Increased hair growth   • Bleeding   • White or brown marks   • Steroid acne   • Infection   • Telangiectasia   • Skin thinning   • Cutaneous and subcutaneous lipoatrophy (most common) appearing as skin indentations or dimples around the injection sites a few weeks after treatment     PROCEDURE NOTE:  After verbal and written consent were obtained, the to-be-treated area was wiped and cleaned with rubbing alcohol 70%  Then, a total of 1mL of Kenalog CONCENTRATION:  10mg/mL   (Lot# [id number]; Expiration 10/31/2023, NDC#: [id number]) was injected intralesionally into a total of 1 lesion/s on the following anatomic areas:  Right shoulder  using a 1-mL syringe and a 30-gauge needle        There was less than 1 mL of blood loss and little to no discomfort  The area was bandaged with a Band-aid  The patient tolerated the procedure well and remained in the office for observation  With no signs of an adverse reaction, the patient was eventually discharged from clinic          Scribe Attestation    I,:  Brianna Bergman MA am acting as a scribe while in the presence of the attending physician :       I,:  Kole Merida MD personally performed the services described in this documentation    as scribed in my presence :

## 2022-12-20 ENCOUNTER — OFFICE VISIT (OUTPATIENT)
Dept: OBGYN CLINIC | Facility: CLINIC | Age: 62
End: 2022-12-20

## 2022-12-20 ENCOUNTER — HOSPITAL ENCOUNTER (OUTPATIENT)
Dept: RADIOLOGY | Facility: HOSPITAL | Age: 62
Discharge: HOME | End: 2022-12-20
Attending: ORTHOPAEDIC SURGERY

## 2022-12-20 VITALS
BODY MASS INDEX: 30.73 KG/M2 | DIASTOLIC BLOOD PRESSURE: 88 MMHG | SYSTOLIC BLOOD PRESSURE: 147 MMHG | HEIGHT: 64 IN | WEIGHT: 180 LBS | HEART RATE: 65 BPM

## 2022-12-20 DIAGNOSIS — Z96.642 STATUS POST TOTAL REPLACEMENT OF LEFT HIP: Primary | ICD-10-CM

## 2022-12-20 DIAGNOSIS — Z96.642 STATUS POST TOTAL REPLACEMENT OF LEFT HIP: ICD-10-CM

## 2022-12-20 NOTE — PROGRESS NOTES
Assessment:  1  Status post total replacement of left hip  XR hip/pelv 2-3 vws left if performed          Plan:    • Patient is 1 year s/p left anterior total hip arthroplasty, DOS 21   • Weight bearing as tolerated left lower extremity   • Repeat x-ray left hip  at the next office visit   • Discussed continued to prophylaxis in the future  • Follow up in 1 year         The above stated was discussed in layman's terms and the patient expressed understanding  All questions were answered to the patient's satisfaction  Subjective: Liliana Gómez is a 58 y o  female who presents today 1 year s/p left anterior total hip arthroplasty, DOS 12/3/21  She is doing well  She denies any pain or discomfort in left hip  She feels her leg lengths are equal  She denies numbness of tingling         Review of systems negative unless otherwise specified in HPI    Past Medical History:   Diagnosis Date   • Anxiety    • Arthritis    • Basal cell carcinoma 2022    Right shoulder, excision    • Cancer ([facility] [address] )    • Depression    • Melanoma ([facility] [address] ) 2018    Left upper arm   • PONV (postoperative nausea and vomiting)        Past Surgical History:   Procedure Laterality Date   •  SECTION     •  SECTION     • FL INJECTION LEFT HIP (ARTHROGRAM)  10/24/2018   • FL INJECTION LEFT HIP (NON ARTHROGRAM)  2018   • FL INJECTION LEFT HIP (NON ARTHROGRAM)  2019   • FL INJECTION LEFT HIP (NON ARTHROGRAM)  2019   • FL INJECTION LEFT HIP (NON ARTHROGRAM)  2019   • FL INJECTION LEFT HIP (NON ARTHROGRAM)  2020   • FL INJECTION LEFT HIP (NON ARTHROGRAM)  2020   • FL INJECTION LEFT HIP (NON ARTHROGRAM)  2021   • KNEE ARTHROSCOPY     • MA TOTAL HIP ARTHROPLASTY Left 2021    Procedure: ARTHROPLASTY HIP TOTAL ANTERIOR;  Surgeon: Lavon Morales MD;  Location: [facility];  Service: Orthopedics   • ROTATOR CUFF REPAIR     • SHOULDER SURGERY     • SKIN BIOPSY  2022   • WISDOM TOOTH EXTRACTION         Family History   Problem Relation Age of Onset   • Arthritis Mother    • Hypertension Mother    • Scoliosis Mother    • Hyperlipidemia Mother    • Heart attack Mother    • Arthritis Father    • Heart disease Father         CARDIAC DISORDER   • Heart attack Father    • Diabetes Sister         borderline   • No Known Problems Sister    • No Known Problems Brother    • No Known Problems Brother    • No Known Problems Son    • No Known Problems Son    • No Known Problems Son    • Alzheimer's disease Maternal Grandmother    • Stroke Maternal Grandfather    • No Known Problems Paternal Grandmother    • No Known Problems Paternal Grandfather    • Birth defects Maternal Aunt    • Ovarian cancer Maternal Aunt    • Breast cancer Maternal Aunt 79   • Heart attack Maternal Aunt    • Birth defects Paternal Aunt    • Lung cancer Paternal Aunt        Social History     Occupational History     Comment: FULL-TIME EMPLOYMENT   Tobacco Use   • Smoking status: Former     Types: Cigarettes     Quit date:      Years since quittin 9   • Smokeless tobacco: Never   Vaping Use   • Vaping Use: Never used   Substance and Sexual Activity   • Alcohol use: Yes     Comment: weekends   • Drug use: No   • Sexual activity: Not Currently     Birth control/protection: Post-menopausal         Current Outpatient Medications:   •  amoxicillin (AMOXIL) 500 mg capsule, Take 500 mg by mouth in the morning Took once prior to today's procedure    Hip surgeon recommended it , Disp: , Rfl:   •  CALCIUM PO, Take 600 mg by mouth in the morning, Disp: , Rfl:   •  ciclopirox (LOPROX) 0 77 % cream, Apply topically 2 (two) times a day Apply to bottom of right foot and in between right toes for 3 weeks, Disp: 90 g, Rfl: 1  •  citalopram (CeleXA) 40 mg tablet, Take 1 tablet (40 mg total) by mouth daily, Disp: 90 tablet, Rfl: 0  •  Multiple Vitamin (MULTI-VITAMIN DAILY PO), , Disp: , Rfl:   •  pramipexole (MIRAPEX) 0 25 mg tablet, 1 1/2 tabs daily, Disp: 135 tablet, Rfl: 0    Allergies   Allergen Reactions   • Monistat [Tioconazole] Other (See Comments)     Severe vaginal burning   • Benzoin Rash   • Meloxicam Rash   • Nickel Itching and Rash            Vitals:    12/20/22 0750   BP: 147/88   Pulse: 65       Objective:            Physical Exam  Physical Exam:      General Appearance:    Alert, cooperative, no distress, appears stated age   Head:    Normocephalic, without obvious abnormality, atraumatic   Eyes:    conjunctiva/corneas clear, both eyes         Nose:   Nares normal, septum midline, no drainage    Throat:   Lips normal; teeth and gums normal   Neck:    symmetrical, trachea midline, ;     thyroid:  no enlargement/   Back:     Symmetric, no curvature, ROM normal   Lungs:   No audible wheezing or labored breathing   Chest Wall:    No tenderness or deformity    Heart:    Regular rate and rhythm                         Pulses:   2+ and symmetric all extremities   Skin:   Skin color, texture, turgor normal, no rashes or lesions   Neurologic:   normal strength, sensation and reflexes     throughout                       Ortho Exam  Left hip   Anterior incision well healed  No erythema   Negative Stinchfield   No TTP   Neurovascularly Intact Distally     Diagnostics, reviewed and taken today if performed as documented: The attending physician has personally reviewed the pertinent films in PACS and interpretation is as follows: x-ray left hip demonstrates s/p JOSEPH adequate alignment of implant, no sign of loosening       Procedures, if performed today:    Procedures    None performed      Scribe Attestation    I,:  Milo Smalls am acting as a scribe while in the presence of the attending physician :       I,:  Katy Segura MD personally performed the services described in this documentation    as scribed in my presence :             Portions of the record may have been created with voice recognition software    Occasional wrong word or "sound a like" substitutions may have occurred due to the inherent limitations of voice recognition software  Read the chart carefully and recognize, using context, where substitutions have occurred

## 2023-01-05 DIAGNOSIS — F33.0 MILD EPISODE OF RECURRENT MAJOR DEPRESSIVE DISORDER (HCC): ICD-10-CM

## 2023-01-05 NOTE — TELEPHONE ENCOUNTER
Medication Refill Request     Name citalopram    Dose/Frequency 40 mg daily   Quantity 90   Verified pharmacy   [x]  Verified ordering Provider   [x]  Does patient have enough for the next 3 days?  Yes [x] No []

## 2023-01-06 RX ORDER — CITALOPRAM 40 MG/1
40 TABLET ORAL DAILY
Qty: 90 TABLET | Refills: 0 | Status: SHIPPED | OUTPATIENT
Start: 2023-01-06

## 2023-03-21 DIAGNOSIS — G25.81 RESTLESS LEG SYNDROME: ICD-10-CM

## 2023-03-21 RX ORDER — PRAMIPEXOLE DIHYDROCHLORIDE 0.25 MG/1
TABLET ORAL
Qty: 135 TABLET | Refills: 3 | Status: SHIPPED | OUTPATIENT
Start: 2023-03-21

## 2023-03-21 NOTE — TELEPHONE ENCOUNTER
Medication Refill Request     Name Pramipexole   Dose/Frequency 0 25 mg   Quantity 135  Verified pharmacy   [x]  Verified ordering Provider   [x]  Does patient have enough for the next 3 days?  Yes [x] No []

## 2023-04-06 DIAGNOSIS — F33.0 MILD EPISODE OF RECURRENT MAJOR DEPRESSIVE DISORDER (HCC): ICD-10-CM

## 2023-04-06 RX ORDER — CITALOPRAM 40 MG/1
40 TABLET ORAL DAILY
Qty: 90 TABLET | Refills: 1 | Status: SHIPPED | OUTPATIENT
Start: 2023-04-06

## 2023-04-28 NOTE — PROGRESS NOTES
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 - no recurrences              RTO 1 yr  SBA monthly  3 D Mammo  Colonoscopy q 3 yrs- 5/17, 6/22 - Polyps - '25  Ca 1,000 mg/d with Vit D - does  Exercise 3/wk - tennis and yoga, walking  old records- reviewed 5/2/17- normal BMD '16- repeat 65    Depression Screen: Positive, being mamanged       Diagnoses and all orders for this visit:    Encounter for annual routine gynecological examination    Screening mammogram, encounter for  -     Mammo screening bilateral w 3d & cad; Future              Subjective:        Patient ID: Suzanne Gaviria is a 58 y o  female  Isa Preciado returns for a yearly evaluation  She has noted hair thinning the past year and back itching where she had one of her basal cell cancer treatments  She denies any vaginal bleeding  She is not in a relationship  Her son Wilbur Chen is going to be a father  They are having a daughter  The following portions of the patient's history were reviewed and updated as appropriate: She  has a past medical history of Anxiety, Arthritis, Basal cell carcinoma (04/06/2022), Cancer ([facility] [address] ), Depression, Melanoma ([facility] [address] ) (04/2018), and PONV (postoperative nausea and vomiting)    Patient Active Problem List    Diagnosis Date Noted    Cancer [facility]) 06/27/2022    Encounter for annual routine gynecological examination 03/23/2022    Screening mammogram, encounter for 03/23/2022    Restless legs syndrome (RLS) 01/12/2022    Status post total replacement of left hip 12/13/2021    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 11/01/2019    Lateral epicondylitis of right elbow 02/13/2019    Primary localized osteoarthritis of left hip 10/31/2018    Anxiety 03/23/2017    Depression 03/23/2017    Hyperlipidemia 07/24/2014   PMH:  Menarche 13  G3, P3;  FVD 42 wks '89,  SAVD  '91; C/S c TL for Breech '94  R Shoulder Arthroscopy '92  Bilateral Carpal Tunnel Repair  Anxiety /Depression '00  Menopause  '10- 52  Recovered Alcoholic   R Sciatica '15  Hypercholesterolemia '16  Skin Fungus- early 40's  Melanoma- L upper arm 3/17   Vulvar HSV I         Labral Tear          Brief relapse in alcoholism        L Hip Replacement        Dog Bite - R Calf 3/22  She  has a past surgical history that includes  section; [place] tooth extraction; Shoulder surgery;  section (); FL injection left hip (arthrogram) (10/24/2018); FL injection left hip (non arthrogram) (2018); FL injection left hip (non arthrogram) (2019); FL injection left hip (non arthrogram) (2019); FL injection left hip (non arthrogram) (2019); FL injection left hip (non arthrogram) (2020); FL injection left hip (non arthrogram) (2020); FL injection left hip (non arthrogram) (2021); Skin biopsy (2022); Rotator cuff repair; Knee arthroscopy; pr arthrp acetblr/prox fem prostc agrft/algrft (Left, 2021); and Colonoscopy (2022)  Her family history includes Alzheimer's disease in her maternal grandmother; Arthritis in her father and mother; Birth defects in her maternal aunt and paternal aunt; Breast cancer (age of onset: 79) in her maternal aunt; Diabetes in her sister; Heart attack in her father, maternal aunt, and mother; Heart disease in her father; Hyperlipidemia in her mother; Hypertension in her mother; Lung cancer in her paternal aunt; No Known Problems in her brother, brother, paternal grandfather, paternal grandmother, sister, son, son, and son; Ovarian cancer in her maternal aunt; Scoliosis in her mother; Stroke in her maternal grandfather  FH:  MA- Ovarian Ca 61  MA- Breast Ca 70  PA- Lung Ca 79  M-  Arthritis, HTN, MI d 80 10/18  F- HTN, 3rd MI 80   She  reports that she quit smoking about 38 years ago  Her smoking use included cigarettes  She has never used smokeless tobacco  She reports current alcohol use  She reports that she does not use drugs  SH:   '87,  '15 , he was an alcoholic too   for [employer]  Sons are doing well- Ivan Patches  She had a brief relapse in alcoholism 1/21   Her sons live in [facility], [address] and the [facility]  Visited family in [facility] 4/21  Peter Yin is expecting a daughter, Anastasiya Redding  Current Outpatient Medications   Medication Sig Dispense Refill    amoxicillin (AMOXIL) 500 mg capsule Take 500 mg by mouth in the morning Took once prior to today's procedure   Hip surgeon recommended it   CALCIUM PO Take 600 mg by mouth in the morning      citalopram (CeleXA) 40 mg tablet Take 1 tablet (40 mg total) by mouth daily 90 tablet 1    Multiple Vitamin (MULTI-VITAMIN DAILY PO)       pramipexole (MIRAPEX) 0 25 mg tablet 1 1/2 tabs daily 135 tablet 3    ciclopirox (LOPROX) 0 77 % cream Apply topically 2 (two) times a day Apply to bottom of right foot and in between right toes for 3 weeks (Patient not taking: Reported on 4/18/2023) 90 g 1     No current facility-administered medications for this visit  Current Outpatient Medications on File Prior to Visit   Medication Sig    amoxicillin (AMOXIL) 500 mg capsule Take 500 mg by mouth in the morning Took once prior to today's procedure   Hip surgeon recommended it   CALCIUM PO Take 600 mg by mouth in the morning    citalopram (CeleXA) 40 mg tablet Take 1 tablet (40 mg total) by mouth daily    Multiple Vitamin (MULTI-VITAMIN DAILY PO)     pramipexole (MIRAPEX) 0 25 mg tablet 1 1/2 tabs daily    ciclopirox (LOPROX) 0 77 % cream Apply topically 2 (two) times a day Apply to bottom of right foot and in between right toes for 3 weeks (Patient not taking: Reported on 4/18/2023)     No current facility-administered medications on file prior to visit  She is allergic to monistat [tioconazole], benzoin, meloxicam, and nickel       Review of Systems   Constitutional: Negative for activity change, appetite change, fatigue and "unexpected weight change  Eyes: Negative for visual disturbance  Respiratory: Negative for cough, chest tightness, shortness of breath and wheezing  Cardiovascular: Negative for chest pain, palpitations and leg swelling  Breast: Patient denies tenderness, nipple discharge, masses, or erythema  Gastrointestinal: Negative for abdominal distention, abdominal pain, blood in stool, constipation, diarrhea, nausea and vomiting  Endocrine: Negative for cold intolerance and heat intolerance  Genitourinary: Negative for decreased urine volume, difficulty urinating, dysuria, frequency, hematuria, menstrual problem, pelvic pain, urgency, vaginal bleeding, vaginal discharge and vaginal pain  Rare stress incontinence  Musculoskeletal: Positive for arthralgias (Right shoulder)  Skin: Negative for rash  Hair thinning   Neurological: Negative for weakness, light-headedness, numbness and headaches  Hematological: Does not bruise/bleed easily  Psychiatric/Behavioral: Negative for agitation, behavioral problems and sleep disturbance  The patient is not nervous/anxious  Objective:    Vitals:    05/01/23 0805   BP: 140/74   BP Location: Left arm   Patient Position: Sitting   Cuff Size: Large   Weight: 85 2 kg (187 lb 12 8 oz)   Height: 5' 2 7\" (1 593 m)            Physical Exam  Vitals and nursing note reviewed  Exam conducted with a chaperone present  Constitutional:       General: She is not in acute distress  Appearance: She is well-developed  HENT:      Head: Normocephalic and atraumatic  Eyes:      General: No scleral icterus  Right eye: No discharge  Left eye: No discharge  Extraocular Movements: Extraocular movements intact  Conjunctiva/sclera: Conjunctivae normal    Neck:      Thyroid: No thyromegaly  Trachea: No tracheal deviation  Cardiovascular:      Rate and Rhythm: Normal rate and regular rhythm        Heart sounds: Normal heart " sounds  No murmur heard  Pulmonary:      Effort: Pulmonary effort is normal  No respiratory distress  Breath sounds: Normal breath sounds  No wheezing  Chest:   Breasts:     Breasts are symmetrical       Right: No inverted nipple, mass, nipple discharge, skin change or tenderness  Left: No inverted nipple, mass, nipple discharge, skin change or tenderness  Abdominal:      General: Bowel sounds are normal  There is no distension  Palpations: Abdomen is soft  There is no mass  Tenderness: There is no abdominal tenderness  There is no guarding or rebound  Genitourinary:     General: Normal vulva  Labia:         Right: No rash, tenderness or lesion  Left: No rash, tenderness or lesion  Vagina: Normal       Cervix: No cervical motion tenderness or discharge  Uterus: Not deviated, not enlarged and not tender  Adnexa:         Right: No mass, tenderness or fullness  Left: No mass, tenderness or fullness  Rectum: No external hemorrhoid  Comments: Urethral meatus within normal limits  Perineum within normal limits  Bladder well supported  Physiologic vaginal atrophy  Musculoskeletal:         General: No tenderness  Normal range of motion  Cervical back: Normal range of motion and neck supple  Lymphadenopathy:      Cervical: No cervical adenopathy  Skin:     General: Skin is warm and dry  Neurological:      Mental Status: She is alert and oriented to person, place, and time  Psychiatric:         Mood and Affect: Mood normal          Behavior: Behavior normal          Thought Content:  Thought content normal          Judgment: Judgment normal

## 2023-05-01 ENCOUNTER — ANNUAL EXAM (OUTPATIENT)
Dept: OBGYN CLINIC | Facility: CLINIC | Age: 63
End: 2023-05-01

## 2023-05-01 VITALS
BODY MASS INDEX: 33.27 KG/M2 | WEIGHT: 187.8 LBS | DIASTOLIC BLOOD PRESSURE: 74 MMHG | HEIGHT: 63 IN | SYSTOLIC BLOOD PRESSURE: 140 MMHG

## 2023-05-01 DIAGNOSIS — Z01.419 ENCOUNTER FOR ANNUAL ROUTINE GYNECOLOGICAL EXAMINATION: Primary | ICD-10-CM

## 2023-05-01 DIAGNOSIS — Z12.31 SCREENING MAMMOGRAM, ENCOUNTER FOR: ICD-10-CM

## 2023-05-16 ENCOUNTER — OFFICE VISIT (OUTPATIENT)
Dept: FAMILY MEDICINE CLINIC | Facility: CLINIC | Age: 63
End: 2023-05-16

## 2023-05-16 VITALS
DIASTOLIC BLOOD PRESSURE: 70 MMHG | WEIGHT: 184.4 LBS | OXYGEN SATURATION: 98 % | TEMPERATURE: 99.7 F | HEIGHT: 63 IN | RESPIRATION RATE: 16 BRPM | HEART RATE: 74 BPM | SYSTOLIC BLOOD PRESSURE: 114 MMHG | BODY MASS INDEX: 32.67 KG/M2

## 2023-05-16 DIAGNOSIS — E78.2 MIXED HYPERLIPIDEMIA: ICD-10-CM

## 2023-05-16 DIAGNOSIS — Z00.00 ANNUAL PHYSICAL EXAM: Primary | ICD-10-CM

## 2023-05-16 DIAGNOSIS — F33.0 MILD EPISODE OF RECURRENT MAJOR DEPRESSIVE DISORDER (HCC): ICD-10-CM

## 2023-05-16 DIAGNOSIS — G25.81 RESTLESS LEGS SYNDROME (RLS): ICD-10-CM

## 2023-05-16 PROBLEM — M16.12 PRIMARY LOCALIZED OSTEOARTHRITIS OF LEFT HIP: Status: RESOLVED | Noted: 2018-10-31 | Resolved: 2023-05-16

## 2023-05-16 PROBLEM — Z01.419 ENCOUNTER FOR ANNUAL ROUTINE GYNECOLOGICAL EXAMINATION: Status: RESOLVED | Noted: 2022-03-23 | Resolved: 2023-05-16

## 2023-05-16 NOTE — PROGRESS NOTES
[address] [facility]    NAME: Tameka Hughes  AGE: 58 y o  SEX: female  : 1960     DATE: 2023     Assessment and Plan:     Problem List Items Addressed This Visit        Other    Depression     Stable on citalopram          Hyperlipidemia     Due for labs          Restless legs syndrome (RLS)     mirapex as directed         Other Visit Diagnoses     Annual physical exam    -  Primary    Relevant Orders    CBC and differential    TSH, 3rd generation with Free T4 reflex    Comprehensive metabolic panel    Lipid Panel with Direct LDL reflex    Hemoglobin A1C            Immunizations and preventive care screenings were discussed with patient today  Appropriate education was printed on patient's after visit summary  Counseling:  Alcohol/drug use: discussed moderation in alcohol intake, the recommendations for healthy alcohol use, and avoidance of illicit drug use  Dental Health: discussed importance of regular tooth brushing, flossing, and dental visits  Injury prevention: discussed safety/seat belts, safety helmets, smoke detectors, carbon dioxide detectors, and smoking near bedding or upholstery  Sexual health: discussed sexually transmitted diseases, partner selection, use of condoms, avoidance of unintended pregnancy, and contraceptive alternatives  Exercise: the importance of regular exercise/physical activity was discussed  Recommend exercise 3-5 times per week for at least 30 minutes  BMI Counseling: Body mass index is 33 09 kg/m²  The BMI is above normal  Nutrition recommendations include decreasing portion sizes and encouraging healthy choices of fruits and vegetables  Exercise recommendations include moderate physical activity 150 minutes/week  No pharmacotherapy was ordered  Rationale for BMI follow-up plan is due to patient being overweight or obese           Return in about 1 year (around 2024) for Annual physical      Chief Complaint:     Chief Complaint   Patient presents with   • Annual Exam     Patient here for annual wellness exam       History of Present Illness:     Adult Annual Physical   Patient here for a comprehensive physical exam  The patient reports no problems  Diet and Physical Activity  Diet/Nutrition: well balanced diet and consuming 3-5 servings of fruits/vegetables daily  Exercise: walking and moderate cardiovascular exercise  Depression Screening  PHQ-2/9 Depression Screening    Little interest or pleasure in doing things: 0 - not at all  Feeling down, depressed, or hopeless: 0 - not at all  Trouble falling or staying asleep, or sleeping too much: 0 - not at all  Feeling tired or having little energy: 0 - not at all  Poor appetite or overeatin - not at all  Feeling bad about yourself - or that you are a failure or have let yourself or your family down: 0 - not at all  Trouble concentrating on things, such as reading the newspaper or watching television: 0 - not at all  Moving or speaking so slowly that other people could have noticed  Or the opposite - being so fidgety or restless that you have been moving around a lot more than usual: 0 - not at all  Thoughts that you would be better off dead, or of hurting yourself in some way: 0 - not at all  PHQ-9 Score: 0   PHQ-9 Interpretation: No or Minimal depression        General Health  Sleep: sleeps well and gets 7-8 hours of sleep on average  Hearing: normal - bilateral   Vision: goes for regular eye exams  Dental: regular dental visits and brushes teeth twice daily  /GYN Health  Patient is: postmenopausal  Last menstrual period: 12 years ago  Contraceptive method: none  Review of Systems:     Review of Systems   Constitutional: Negative  HENT: Negative  Eyes: Negative  Respiratory: Negative  Cardiovascular: Negative  Gastrointestinal: Negative  Endocrine: Negative  Genitourinary: Negative  Musculoskeletal: Negative  Skin: Negative  Allergic/Immunologic: Negative  Neurological: Negative  Hematological: Negative  Psychiatric/Behavioral: Negative         Past Medical History:     Past Medical History:   Diagnosis Date   • Anxiety    • Arthritis    • Basal cell carcinoma 2022    Right shoulder, excision    • Cancer ([facility] [address] )    • Depression    • Melanoma ([facility] [address] ) 2018    Left upper arm   • PONV (postoperative nausea and vomiting)    • Primary localized osteoarthritis of left hip 10/31/2018      Past Surgical History:     Past Surgical History:   Procedure Laterality Date   •  SECTION     •  SECTION     • COLONOSCOPY  2022    3 years   • FL INJECTION LEFT HIP (ARTHROGRAM)  10/24/2018   • FL INJECTION LEFT HIP (NON ARTHROGRAM)  2018   • FL INJECTION LEFT HIP (NON ARTHROGRAM)  2019   • FL INJECTION LEFT HIP (NON ARTHROGRAM)  2019   • FL INJECTION LEFT HIP (NON ARTHROGRAM)  2019   • FL INJECTION LEFT HIP (NON ARTHROGRAM)  2020   • FL INJECTION LEFT HIP (NON ARTHROGRAM)  2020   • FL INJECTION LEFT HIP (NON ARTHROGRAM)  2021   • KNEE ARTHROSCOPY     • AL ARTHRP ACETBLR/PROX FEM PROSTC AGRFT/ALGRFT Left 2021    Procedure: ARTHROPLASTY HIP TOTAL ANTERIOR;  Surgeon: Gerard Moody MD;  Location: [facility];  Service: Orthopedics   • ROTATOR CUFF REPAIR     • SHOULDER SURGERY     • SKIN BIOPSY  2022   • WISDOM TOOTH EXTRACTION        Social History:     Social History     Socioeconomic History   • Marital status:      Spouse name: None   • Number of children: 3   • Years of education: None   • Highest education level: None   Occupational History     Comment: FULL-TIME EMPLOYMENT   Tobacco Use   • Smoking status: Former     Types: Cigarettes     Quit date:      Years since quittin 3   • Smokeless tobacco: Never   Vaping Use   • Vaping Use: Never used   Substance and Sexual Activity   • Alcohol use: Yes     Comment: weekends   • Drug use: No   • Sexual activity: Not Currently     Birth control/protection: Post-menopausal, Abstinence     Comment:    Other Topics Concern   • None   Social History Narrative    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, 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    EXERCISES 3 TO 4 TIMES PER WEEK     Social Determinants of Health     Financial Resource Strain: Not on file   Food Insecurity: Not on file   Transportation Needs: Not on file   Physical Activity: Not on file   Stress: Not on file   Social Connections: Not on file   Intimate Partner Violence: Not on file   Housing Stability: Not on file      Family History:     Family History   Problem Relation Age of Onset   • Arthritis Mother    • Hypertension Mother    • Scoliosis Mother    • Hyperlipidemia Mother    • Heart attack Mother    • Arthritis Father    • Heart disease Father         CARDIAC DISORDER   • Heart attack Father    • Diabetes Sister         borderline   • No Known Problems Sister    • No Known Problems Brother    • No Known Problems Brother    • No Known Problems Son    • No Known Problems Son    • No Known Problems Son    • Alzheimer's disease Maternal Grandmother    • Stroke Maternal Grandfather    • No Known Problems Paternal Grandmother    • No Known Problems Paternal Grandfather    • Birth defects Maternal Aunt    • Ovarian cancer Maternal Aunt    • Breast cancer Maternal Aunt 79   • Heart attack Maternal Aunt    • Birth defects Paternal Aunt    • Lung cancer Paternal Aunt       Current Medications:     Current Outpatient Medications   Medication Sig Dispense Refill   • amoxicillin (AMOXIL) 500 mg capsule Take 500 mg by mouth in the morning Took once prior to today's procedure   Hip surgeon recommended it       • CALCIUM PO Take 600 mg by mouth in the morning     • citalopram (CeleXA) 40 mg tablet Take 1 tablet (40 mg total) by mouth daily 90 tablet 1   • Multiple Vitamin (MULTI-VITAMIN DAILY PO)      • Multiple Vitamins-Minerals (Hair "Skin and Nails Formula) TABS Take by mouth     • pramipexole (MIRAPEX) 0 25 mg tablet 1 1/2 tabs daily 135 tablet 3   • ciclopirox (LOPROX) 0 77 % cream Apply topically 2 (two) times a day Apply to bottom of right foot and in between right toes for 3 weeks (Patient not taking: Reported on 4/18/2023) 90 g 1     No current facility-administered medications for this visit  Allergies: Allergies   Allergen Reactions   • Monistat [Tioconazole] Other (See Comments)     Severe vaginal burning   • Benzoin Rash   • Meloxicam Rash   • Nickel Itching and Rash      Physical Exam:     /70 (BP Location: Left arm, Patient Position: Sitting, Cuff Size: Large)   Pulse 74   Temp 99 7 °F (37 6 °C) (Tympanic)   Resp 16   Ht 5' 2 6\" (1 59 m)   Wt 83 6 kg (184 lb 6 4 oz)   SpO2 98%   BMI 33 09 kg/m²     Physical Exam  Vitals and nursing note reviewed  Constitutional:       General: She is not in acute distress  Appearance: Normal appearance  She is well-developed  HENT:      Head: Normocephalic and atraumatic  Right Ear: Tympanic membrane normal       Left Ear: Tympanic membrane normal       Nose: Nose normal       Mouth/Throat:      Mouth: Mucous membranes are moist    Eyes:      Conjunctiva/sclera: Conjunctivae normal    Cardiovascular:      Rate and Rhythm: Normal rate and regular rhythm  Pulses: Normal pulses  Heart sounds: Normal heart sounds  No murmur heard  Pulmonary:      Effort: Pulmonary effort is normal  No respiratory distress  Breath sounds: Normal breath sounds  Abdominal:      Palpations: Abdomen is soft  Tenderness: There is no abdominal tenderness  Musculoskeletal:         General: No swelling  Cervical back: Normal range of motion and neck supple  Skin:     General: Skin is warm  Capillary Refill: Capillary refill takes less than 2 seconds  Neurological:      General: No focal deficit present        Mental Status: She is alert and oriented to " person, place, and time     Psychiatric:         Mood and Affect: Mood normal           Blaze Sampson MD  [address] [facility]

## 2023-05-19 ENCOUNTER — TELEPHONE (OUTPATIENT)
Dept: OBGYN CLINIC | Facility: HOSPITAL | Age: 63
End: 2023-05-19

## 2023-05-19 NOTE — TELEPHONE ENCOUNTER
Pt contacted Call Center requested refill of their medication  Medication Name: Amoxicillin      Dosage of Med:  500mg       Frequency of Med: 4 capsules 1 hour prior to dental         Remaining Medication: none      Pharmacy and Location: [facility] ([place] [address]) [place] Diop, [address] [place] [address] [address]   Phone:  [phone]  Fax:  [phone]   SHARON #:  --        Pt  Preferred Callback Phone Number: [phone]      Thank you

## 2023-05-22 DIAGNOSIS — Z96.642 STATUS POST TOTAL REPLACEMENT OF LEFT HIP: Primary | ICD-10-CM

## 2023-05-22 RX ORDER — AMOXICILLIN 500 MG/1
CAPSULE ORAL
Qty: 4 CAPSULE | Refills: 2 | Status: SHIPPED | OUTPATIENT
Start: 2023-05-22 | End: 2023-05-23

## 2023-06-09 ENCOUNTER — OFFICE VISIT (OUTPATIENT)
Dept: DERMATOLOGY | Facility: CLINIC | Age: 63
End: 2023-06-09
Payer: COMMERCIAL

## 2023-06-09 VITALS — BODY MASS INDEX: 33.31 KG/M2 | HEIGHT: 63 IN | TEMPERATURE: 97.5 F | WEIGHT: 188 LBS

## 2023-06-09 DIAGNOSIS — L91.0 HYPERTROPHIC SCAR OF SKIN: Primary | ICD-10-CM

## 2023-06-09 PROCEDURE — 11900 INJECT SKIN LESIONS </W 7: CPT | Performed by: DERMATOLOGY

## 2023-06-09 NOTE — PROGRESS NOTES
"[place] [address] [facility] Note     Patient Name: Humera Montero  Encounter Date: 6/9/2023     Have you been cared for by a [place] [address] Dermatologist in the last 3 years and, if so, which description applies to you? Yes  I have been here within the last 3 years, and my medical history has NOT changed since that time  I am FEMALE/not capable of bearing children  REVIEW OF SYSTEMS:  Have you recently had or currently have any of the following? · No changes in my recent health  PAST MEDICAL HISTORY:  Have you personally ever had or currently have any of the following? If \"YES,\" then please provide more detail  · No changes in my medical history  FAMILY HISTORY:  Any \"first degree relatives\" (parent, brother, sister, or child) with the following? • No changes in my family's known health  PATIENT EXPERIENCE:    • Do you want the Dermatologist to perform a COMPLETE skin exam today including a clinical examination under the \"bra and underwear\" areas? NO  • If necessary, do we have your permission to call and leave a detailed message on your Preferred Phone number that includes your specific medical information?   Yes      Allergies   Allergen Reactions   • Monistat [Tioconazole] Other (See Comments)     Severe vaginal burning   • Benzoin Rash   • Meloxicam Rash   • Nickel Itching and Rash      Current Outpatient Medications:   •  CALCIUM PO, Take 600 mg by mouth in the morning, Disp: , Rfl:   •  ciclopirox (LOPROX) 0 77 % cream, Apply topically 2 (two) times a day Apply to bottom of right foot and in between right toes for 3 weeks (Patient not taking: Reported on 4/18/2023), Disp: 90 g, Rfl: 1  •  citalopram (CeleXA) 40 mg tablet, Take 1 tablet (40 mg total) by mouth daily, Disp: 90 tablet, Rfl: 1  •  Multiple Vitamin (MULTI-VITAMIN DAILY PO), , Disp: , Rfl:   •  Multiple Vitamins-Minerals (Hair Skin and Nails Formula) TABS, Take by mouth, Disp: , Rfl:   •  pramipexole (MIRAPEX) 0 25 mg tablet, 1 1/2 tabs " daily, Disp: 135 tablet, Rfl: 3          • Whom besides the patient is providing clinical information about today's encounter?   o NO ADDITIONAL HISTORIAN (patient alone provided history)    Physical Exam and Assessment/Plan by Diagnosis:    HYPERTROPHIC SCAR-FOLLOW UP     Physical Exam:  • Anatomic Location Affected:  Right upper back   • Morphological Description:  Hypertrophic scar   • Pertinent Positives:  • Pertinent Negatives: Additional History of Present Condition:  Patient presents for follow up on right upper back injected with Kenalog at last visit with improvement, having itching and pain  Assessment and Plan:  Based on a thorough discussion of this condition and the management approach to it (including a comprehensive discussion of the known risks, side effects and potential benefits of treatment), the patient (family) agrees to implement the following specific plan:  • Recommended Kenalog injection in office with signed consent  • Follow up 7/27/23     Assessment and Plan  As wounds heal, scar tissue forms, which at first is often red and somewhat prominent  Over several months, a scar usually becomes flat and pale  If there is a lot of tension on a healing wound, the healing area is rather thicker than usual  This is known as a hypertrophic scar  A hypertrophic scar is limited to the damaged skin  A hypertrophic scar generally settles in time or with treatment, but a keloid may persist and prove resistant to treatment  The following measures are helpful in at least some patients    • Emollients (creams and oils)  • Polyurethane or silicone scar reduction patches  • Silicone gel  • Oral or topical tranilast (an inhibitor of collagen synthesis)  • Pressure dressings  • Surgical excision (but in keloids, excision may result in a new keloid even larger than the original one)  • Intralesional corticosteroid injection, repeated every few weeks  • Intralesional 5-fluorouracil  • Cryotherapy  • Superficial X-ray treatment soon after surgery  • Pulsed dye laser   • Skin needling  • Subcision  •   Scar dressings should be worn for 12-24 hours per day, for at least 8 to 12 weeks, and perhaps for much longer  PROCEDURE:  INTRALESIONAL STEROID INJECTION (KENALOG INJECTION)    Purpose: Triamcinolone is a synthetic glucocorticoid corticosteroid that has marked anti-inflammatory action  It is prepared in sterile aqueous suspension suitable for injecting directly into a lesion on or immediately below the skin to treat a dermal inflammatory process  Indications: It is indicated for alopecia areata; inflammatory acne cysts; discoid lupus erythematosus; keloids and hypertrophic scars; inflammatory lesions of granuloma annulare, lichen planus, lichen simplex chronicus (neurodermatitis), psoriatic plaques, and other localized inflammatory skin conditions  Potential Side Effects: I understand that triamcinolone injection can potentially cause early and/or delayed adverse effects such as:   • Pain   • Impaired wound healing   • Increased hair growth   • Bleeding   • White or brown marks   • Steroid acne   • Infection   • Telangiectasia   • Skin thinning   • Cutaneous and subcutaneous lipoatrophy (most common) appearing as skin indentations or dimples around the injection sites a few weeks after treatment     PROCEDURE NOTE:  After verbal and written consent were obtained, the to-be-treated area was wiped and cleaned with rubbing alcohol 70%  Then, a total of 0 5 mL of Kenalog CONCENTRATION:  10 mg/mL   (Lot# [id number]; Expiration 08/2024, NDC#: 2671-7687-07) was injected intralesionally into a total of 1 lesion/s on the following anatomic areas:  Right upper back using a 1-mL syringe and a 30-gauge needle  There was less than 1 mL of blood loss and little to no discomfort  The area was bandaged with a Band-aid    The patient tolerated the procedure well and remained in the office for observation  With no signs of an adverse reaction, the patient was eventually discharged from clinic        Scribe Attestation    I,:  Sara No am acting as a scribe while in the presence of the attending physician :       I,:  Gail New MD personally performed the services described in this documentation    as scribed in my presence :

## 2023-06-09 NOTE — PATIENT INSTRUCTIONS
HYPERTROPHIC SCAR-FOLLOW UP       Assessment and Plan:  Based on a thorough discussion of this condition and the management approach to it (including a comprehensive discussion of the known risks, side effects and potential benefits of treatment), the patient (family) agrees to implement the following specific plan:  Recommended Kenalog injection in office with signed consent  Follow up 7/27/23     Assessment and Plan  As wounds heal, scar tissue forms, which at first is often red and somewhat prominent  Over several months, a scar usually becomes flat and pale  If there is a lot of tension on a healing wound, the healing area is rather thicker than usual  This is known as a hypertrophic scar  A hypertrophic scar is limited to the damaged skin  A hypertrophic scar generally settles in time or with treatment, but a keloid may persist and prove resistant to treatment  The following measures are helpful in at least some patients  Emollients (creams and oils)  Polyurethane or silicone scar reduction patches  Silicone gel  Oral or topical tranilast (an inhibitor of collagen synthesis)  Pressure dressings  Surgical excision (but in keloids, excision may result in a new keloid even larger than the original one)  Intralesional corticosteroid injection, repeated every few weeks  Intralesional 5-fluorouracil  Cryotherapy  Superficial X-ray treatment soon after surgery  Pulsed dye laser   Skin needling  Subcision    Scar dressings should be worn for 12-24 hours per day, for at least 8 to 12 weeks, and perhaps for much longer  PROCEDURE:  INTRALESIONAL STEROID INJECTION (KENALOG INJECTION)    Purpose: Triamcinolone is a synthetic glucocorticoid corticosteroid that has marked anti-inflammatory action  It is prepared in sterile aqueous suspension suitable for injecting directly into a lesion on or immediately below the skin to treat a dermal inflammatory process  Indications:  It is indicated for alopecia areata; inflammatory acne cysts; discoid lupus erythematosus; keloids and hypertrophic scars; inflammatory lesions of granuloma annulare, lichen planus, lichen simplex chronicus (neurodermatitis), psoriatic plaques, and other localized inflammatory skin conditions       Potential Side Effects: I understand that triamcinolone injection can potentially cause early and/or delayed adverse effects such as:    Pain    Impaired wound healing    Increased hair growth    Bleeding    White or brown marks    Steroid acne    Infection    Telangiectasia    Skin thinning    Cutaneous and subcutaneous lipoatrophy (most common) appearing as skin indentations or dimples around the injection sites a few weeks after treatment

## 2023-07-17 ENCOUNTER — TELEPHONE (OUTPATIENT)
Dept: PSYCHIATRY | Facility: CLINIC | Age: 63
End: 2023-07-17

## 2023-07-29 ENCOUNTER — APPOINTMENT (OUTPATIENT)
Dept: LAB | Facility: CLINIC | Age: 63
End: 2023-07-29
Payer: COMMERCIAL

## 2023-07-29 DIAGNOSIS — Z00.8 HEALTH EXAMINATION IN POPULATION SURVEY: ICD-10-CM

## 2023-07-29 DIAGNOSIS — Z00.00 ANNUAL PHYSICAL EXAM: ICD-10-CM

## 2023-07-29 LAB
ALBUMIN SERPL BCP-MCNC: 4.4 G/DL (ref 3.5–5)
ALP SERPL-CCNC: 41 U/L (ref 34–104)
ALT SERPL W P-5'-P-CCNC: 13 U/L (ref 7–52)
ANION GAP SERPL CALCULATED.3IONS-SCNC: 7 MMOL/L
AST SERPL W P-5'-P-CCNC: 15 U/L (ref 13–39)
BASOPHILS # BLD AUTO: 0.05 THOUSANDS/ÂΜL (ref 0–0.1)
BASOPHILS NFR BLD AUTO: 1 % (ref 0–1)
BILIRUB SERPL-MCNC: 0.69 MG/DL (ref 0.2–1)
BUN SERPL-MCNC: 19 MG/DL (ref 5–25)
CALCIUM SERPL-MCNC: 9.5 MG/DL (ref 8.4–10.2)
CHLORIDE SERPL-SCNC: 103 MMOL/L (ref 96–108)
CHOLEST SERPL-MCNC: 286 MG/DL
CO2 SERPL-SCNC: 28 MMOL/L (ref 21–32)
CREAT SERPL-MCNC: 0.74 MG/DL (ref 0.6–1.3)
EOSINOPHIL # BLD AUTO: 0.13 THOUSAND/ÂΜL (ref 0–0.61)
EOSINOPHIL NFR BLD AUTO: 3 % (ref 0–6)
ERYTHROCYTE [DISTWIDTH] IN BLOOD BY AUTOMATED COUNT: 13 % (ref 11.6–15.1)
EST. AVERAGE GLUCOSE BLD GHB EST-MCNC: 114 MG/DL
GFR SERPL CREATININE-BSD FRML MDRD: 87 ML/MIN/1.73SQ M
GLUCOSE P FAST SERPL-MCNC: 93 MG/DL (ref 65–99)
HBA1C MFR BLD: 5.6 %
HCT VFR BLD AUTO: 39.8 % (ref 34.8–46.1)
HDLC SERPL-MCNC: 89 MG/DL
HGB BLD-MCNC: 13 G/DL (ref 11.5–15.4)
IMM GRANULOCYTES # BLD AUTO: 0.02 THOUSAND/UL (ref 0–0.2)
IMM GRANULOCYTES NFR BLD AUTO: 0 % (ref 0–2)
LDLC SERPL CALC-MCNC: 178 MG/DL (ref 0–100)
LYMPHOCYTES # BLD AUTO: 1.8 THOUSANDS/ÂΜL (ref 0.6–4.47)
LYMPHOCYTES NFR BLD AUTO: 37 % (ref 14–44)
MCH RBC QN AUTO: 30.7 PG (ref 26.8–34.3)
MCHC RBC AUTO-ENTMCNC: 32.7 G/DL (ref 31.4–37.4)
MCV RBC AUTO: 94 FL (ref 82–98)
MONOCYTES # BLD AUTO: 0.58 THOUSAND/ÂΜL (ref 0.17–1.22)
MONOCYTES NFR BLD AUTO: 12 % (ref 4–12)
NEUTROPHILS # BLD AUTO: 2.31 THOUSANDS/ÂΜL (ref 1.85–7.62)
NEUTS SEG NFR BLD AUTO: 47 % (ref 43–75)
NONHDLC SERPL-MCNC: 197 MG/DL
NRBC BLD AUTO-RTO: 0 /100 WBCS
PLATELET # BLD AUTO: 206 THOUSANDS/UL (ref 149–390)
PMV BLD AUTO: 8.9 FL (ref 8.9–12.7)
POTASSIUM SERPL-SCNC: 4.1 MMOL/L (ref 3.5–5.3)
PROT SERPL-MCNC: 7.4 G/DL (ref 6.4–8.4)
RBC # BLD AUTO: 4.24 MILLION/UL (ref 3.81–5.12)
SODIUM SERPL-SCNC: 138 MMOL/L (ref 135–147)
TRIGL SERPL-MCNC: 96 MG/DL
TSH SERPL DL<=0.05 MIU/L-ACNC: 1.03 UIU/ML (ref 0.45–4.5)
WBC # BLD AUTO: 4.89 THOUSAND/UL (ref 4.31–10.16)

## 2023-07-29 PROCEDURE — 83036 HEMOGLOBIN GLYCOSYLATED A1C: CPT

## 2023-07-29 PROCEDURE — 80061 LIPID PANEL: CPT

## 2023-07-29 PROCEDURE — 84443 ASSAY THYROID STIM HORMONE: CPT

## 2023-07-29 PROCEDURE — 36415 COLL VENOUS BLD VENIPUNCTURE: CPT

## 2023-10-02 NOTE — PROGRESS NOTES
10/02/2023   [facility]  N/A  For questions about this resource list or additional care needs, please contact your primary care clinic or care manager.  Phone: [phone]   Email: N/A   Address: [address] [place] [id number]   Hours: N/A        Housing       Housing search assistance  1  [facility] Distance: 24.97 miles      In-Person   [address] [place] [address]  Language: English  Hours: Mon - Fri 8:00 AM - 4:00 PM  Fees: Free   Phone: (954) 616-4583 Email: Hitesh@"[employer] Website: https://"[employer]/     Shelter for individuals  2  [facility] Office - Emergency Housing Assistance Distance: 6.13 miles      In-Person   [address] [place] [address]  Language: English  Hours: Mon - Fri 8:00 AM - 4:30 PM  Fees: Free   Phone: (261) 562-4668 Ext.4 Email: sarah@[employer] Website: https://www.[facility].org/agency-information          Important Numbers & Websites       Emergency Services   911  City Services   311  Poison Control   (932) 146-9210  Suicide Prevention Lifeline   (664) 322-5129 (TALK)  Child Abuse Hotline   (797) 806-9506 (4-A-Child)  Sexual Assault Hotline   (918) 688-7727 (HOPE)  National Runaway Safeline   (731) 420-9993 (RUNAWAY)  All-Options Talkline   (503) 707-2829  Substance Abuse Referral   (462) 156-1268 (HELP)     Assessment/Plan:  NGE  CoTesting q 5 yrs  Done, '25  ( '18 planned, not seen since 3/17 )  Vulvar HSV I    - no recurrences  RTO 1 yr  SBE monthly  3 D Mammo  Colonoscopy q 3 yrs-   Ca 1,000 mg/d with Vit D - does  Exercise 3/wk - tennis and yoga, walking  old records- reviewed 17- normal BMD - repeat 65         Diagnoses and all orders for this visit:    Encounter for annual routine gynecological examination  -     Liquid-based pap, screening    Screening for cervical cancer    Encounter for screening mammogram for breast cancer  -     Mammo screening bilateral w 3d & cad; Future              Subjective:        Patient ID: Yary Moore is a 61 y o  female  Di Torres is here for her yearly evaluation  She is without complaints  She denies any vaginal bleeding  She was in a relationship from March until October  That is over  In August she developed herpes  She has not had any recurrences  The following portions of the patient's history were reviewed and updated as appropriate: She  has a past medical history of Known health problems: none and Melanoma ([facility] [address] ) (2018)  Patient Active Problem List    Diagnosis Date Noted    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 2019    Lateral epicondylitis of right elbow 2019    Primary localized osteoarthritis of left hip 10/31/2018    Anxiety 2017    Depression 2017    Hyperlipidemia 2014   PMH:  Menarche 13  G3, P3;  FVD 42 wks '89, 1  SAVD  1  '91; C/S c TL for Breech '94  R Shoulder Arthroscopy '  Bilateral Carpal Tunnel Repair  Anxiety /Depression '00  Menopause  '10- 52  Recovered Alcoholic '  R Sciatica '15  Hypercholesterolemia '16  Skin Fungus- early 40's  Melanoma- L upper arm 3/17   Vulvar HSV I    Labral Tear   '14    She  has a past surgical history that includes  section; [place] tooth extraction; Shoulder surgery; SHOULDER ARTHROPLASTY (Right);  section ();  FL injection left hip (arthrogram) (10/24/2018); FL injection left hip (non arthrogram) (11/8/2018); FL injection left hip (non arthrogram) (2/26/2019); FL injection left hip (non arthrogram) (7/23/2019); and FL injection left hip (non arthrogram) (11/18/2019)  Her family history includes Alzheimer's disease in her maternal grandmother; Arthritis in her father and mother; Diabetes in her sister; Heart attack in her father and mother; Heart disease in her father; Hyperlipidemia in her mother; Hypertension in her mother; No Known Problems in her paternal grandfather and paternal grandmother; Ovarian cancer in her maternal aunt; Scoliosis in her mother; Stroke in her maternal grandfather  FH:  MA- Ovarian Ca 61  MA- Breast Ca 70  PA- Lung Ca 79  M-  Arthritis, HTN, MI d 80 10/18  F- HTN, 3rd MI 80 4/19  She  reports that she has quit smoking  She has never used smokeless tobacco  She reports that she drinks alcohol  She reports that she does not use drugs  SH:    '87,  '15 , he was an alcoholic too   for [employer]  Sons are doing well- [place]    Current Outpatient Medications   Medication Sig Dispense Refill    Calcium-Magnesium-Vitamin D (CALCIUM 500) 500-250-200 MG-MG-UNIT TABS Take by mouth      citalopram (CeleXA) 40 mg tablet Take 1 tablet (40 mg total) by mouth daily 90 tablet 3    Multiple Vitamin (MULTI-VITAMIN DAILY PO)       traZODone (DESYREL) 50 mg tablet TAKE 1 TABLET BY MOUTH DAILY AT BEDTIME 90 tablet 0     No current facility-administered medications for this visit        Current Outpatient Medications on File Prior to Visit   Medication Sig    Calcium-Magnesium-Vitamin D (CALCIUM 500) 500-250-200 MG-MG-UNIT TABS Take by mouth    citalopram (CeleXA) 40 mg tablet Take 1 tablet (40 mg total) by mouth daily    Multiple Vitamin (MULTI-VITAMIN DAILY PO)     traZODone (DESYREL) 50 mg tablet TAKE 1 TABLET BY MOUTH DAILY AT BEDTIME    [DISCONTINUED] Iron-Vitamin C (IRON 100/C) 100-250 MG TABS Take by mouth    [DISCONTINUED] Scar Treatment Products (RECEDO) GEL APPLY TWICE A DAY TO SCAR AS NEEDED     No current facility-administered medications on file prior to visit  She is allergic to benzoin; meloxicam; and monistat [tioconazole]       Review of Systems   Constitutional: Negative for activity change, appetite change, fatigue and unexpected weight change  Eyes: Negative for visual disturbance  Respiratory: Negative for cough, chest tightness, shortness of breath and wheezing  Cardiovascular: Negative for chest pain, palpitations and leg swelling  Breast: Patient denies tenderness, nipple discharge, masses, or erythema  Gastrointestinal: Negative for abdominal distention, abdominal pain, blood in stool, constipation, diarrhea, nausea and vomiting  Endocrine: Negative for cold intolerance and heat intolerance  Genitourinary: Negative for decreased urine volume, difficulty urinating, dyspareunia, dysuria, frequency, hematuria, menstrual problem, pelvic pain, urgency, vaginal bleeding, vaginal discharge and vaginal pain  Musculoskeletal: Positive for arthralgias (Left hip, both heels, right elbow)  Skin: Negative for rash  Neurological: Negative for weakness, light-headedness, numbness and headaches  Hematological: Does not bruise/bleed easily  Psychiatric/Behavioral: Negative for agitation, behavioral problems and sleep disturbance  The patient is not nervous/anxious  Objective:    Vitals:    02/17/20 0805   BP: 120/82   BP Location: Left arm   Patient Position: Sitting   Cuff Size: Standard   Pulse: 71   Weight: 75 7 kg (166 lb 12 8 oz)   Height: 5' 4" (1 626 m)            Physical Exam   Constitutional: She is oriented to person, place, and time  She appears well-developed and well-nourished  HENT:   Head: Normocephalic and atraumatic  Eyes: Pupils are equal, round, and reactive to light   Conjunctivae and EOM are normal    Neck: Normal range of motion  Neck supple  No tracheal deviation present  No thyromegaly present  Cardiovascular: Normal rate, regular rhythm and normal heart sounds  No murmur heard  Pulmonary/Chest: Effort normal and breath sounds normal  No respiratory distress  She has no wheezes  Right breast exhibits no inverted nipple, no mass, no nipple discharge, no skin change and no tenderness  Left breast exhibits no inverted nipple, no mass, no nipple discharge, no skin change and no tenderness  No breast tenderness, discharge or bleeding  Breasts are symmetrical    Abdominal: Soft  Bowel sounds are normal  She exhibits no distension and no mass  There is no tenderness  Genitourinary: Vagina normal and uterus normal  Rectal exam shows no external hemorrhoid  No breast tenderness, discharge or bleeding  There is no rash, tenderness or lesion on the right labia  There is no rash, tenderness or lesion on the left labia  Uterus is not deviated, not enlarged and not tender  Cervix exhibits no motion tenderness and no discharge  Right adnexum displays no mass, no tenderness and no fullness  Left adnexum displays no mass, no tenderness and no fullness  Genitourinary Comments: Urethral meatus within normal limits  Perineum within normal limits  Bladder well supported  Musculoskeletal: Normal range of motion  Neurological: She is alert and oriented to person, place, and time  Skin: Skin is warm and dry  Psychiatric: She has a normal mood and affect  Her behavior is normal  Judgment and thought content normal    Nursing note and vitals reviewed

## 2023-10-05 DIAGNOSIS — F33.0 MILD EPISODE OF RECURRENT MAJOR DEPRESSIVE DISORDER (HCC): ICD-10-CM

## 2023-10-05 RX ORDER — CITALOPRAM 40 MG/1
40 TABLET ORAL DAILY
Qty: 90 TABLET | Refills: 0 | Status: SHIPPED | OUTPATIENT
Start: 2023-10-05

## 2023-10-05 NOTE — TELEPHONE ENCOUNTER
Reason for call:   [] Refill   [] Prior Auth  [] Other:     Office:   [x] PCP/Provider -   [] Speciality/Provider -     Medication: Citalopram    Dose/Frequency: 40mg    Quantity: #90    Pharmacy: [facility]    Does the patient have enough for 3 days?    [] Yes   [x] No - Send as HP to POD

## 2023-10-11 ENCOUNTER — NURSE TRIAGE (OUTPATIENT)
Age: 63
End: 2023-10-11

## 2023-10-11 ENCOUNTER — TELEMEDICINE (OUTPATIENT)
Dept: FAMILY MEDICINE CLINIC | Facility: CLINIC | Age: 63
End: 2023-10-11
Payer: COMMERCIAL

## 2023-10-11 VITALS — TEMPERATURE: 100 F | BODY MASS INDEX: 33.73 KG/M2 | HEIGHT: 63 IN

## 2023-10-11 DIAGNOSIS — U07.1 COVID-19: Primary | ICD-10-CM

## 2023-10-11 LAB — SARS-COV-2 AG UPPER RESP QL IA: ABNORMAL

## 2023-10-11 PROCEDURE — 99213 OFFICE O/P EST LOW 20 MIN: CPT | Performed by: FAMILY MEDICINE

## 2023-10-11 RX ORDER — NIRMATRELVIR AND RITONAVIR 300-100 MG
3 KIT ORAL 2 TIMES DAILY
Qty: 30 TABLET | Refills: 0 | Status: SHIPPED | OUTPATIENT
Start: 2023-10-11 | End: 2023-10-11 | Stop reason: SDUPTHER

## 2023-10-11 RX ORDER — NIRMATRELVIR AND RITONAVIR 300-100 MG
3 KIT ORAL 2 TIMES DAILY
Qty: 30 TABLET | Refills: 0 | Status: SHIPPED | OUTPATIENT
Start: 2023-10-11 | End: 2023-10-16

## 2023-10-11 NOTE — TELEPHONE ENCOUNTER
Reason for Disposition  • [1] [id number] diagnosed AND [2] has mild nausea, vomiting or diarrhea    Answer Assessment - Initial Assessment Questions  1. COVID-19 DIAGNOSIS: "Who made your COVID-19 diagnosis?" "Was it confirmed by a positive lab test or self-test?" If not diagnosed by a doctor (or NP/PA), ask "Are there lots of cases (community spread) where you live?" Note: See [facility] health department website, if unsure. Fever 101 fatigue tight chest when denies chest pain and SOB         2. COVID-19 EXPOSURE: "Was there any known exposure to COVID before the symptoms began?" [facility] [place] Definition of close contact: within 6 feet (2 meters) for a total of 15 minutes or more over a 24-hour period. Yes       3. ONSET: "When did the COVID-19 symptoms start?"       10 10 2023      4. WORST SYMPTOM: "What is your worst symptom?" (e.g., cough, fever, shortness of breath, muscle aches)         Fever       5. COUGH: "Do you have a cough?" If Yes, ask: "How bad is the cough?"          Yes       6. FEVER: "Do you have a fever?" If Yes, ask: "What is your temperature, how was it measured, and when did it start?"        101       7. RESPIRATORY STATUS: "Describe your breathing?" (e.g., shortness of breath, wheezing, unable to speak)       WNL    8. BETTER-SAME-WORSE: "Are you getting better, staying the same or getting worse compared to yesterday?"  If getting worse, ask, "In what way?"      same      9. HIGH RISK DISEASE: "Do you have any chronic medical problems?" (e.g., asthma, heart or lung disease, weak immune system, obesity, etc.)      denies    10. VACCINE: "Have you had the COVID-19 vaccine?" If Yes, ask: "Which one, how many shots, when did you get it?"        X 2     11. BOOSTER: "Have you received your COVID-19 booster?" If Yes, ask: "Which one and when did you get it?"        X2          13.  OTHER SYMPTOMS: "Do you have any other symptoms?"  (e.g., chills, fatigue, headache, loss of smell or taste, muscle pain, sore throat)       Fatigue headache    Protocols used: Coronavirus (COVID-19) Diagnosed or Suspected-ADULT-[place]

## 2023-10-11 NOTE — TELEPHONE ENCOUNTER
Patient called in states tested covid positive. Mild symptoms . Wants to know if she needs Paxlovid. Denies SOB or chest pain. Does complain of fever of 101 and intermittent chest tightness. Recommended evaluation within 24 hours. Virtual appointment scheduled for 10 11 2023.

## 2023-10-11 NOTE — PROGRESS NOTES
COVID-19 Outpatient Progress Note    Assessment/Plan:    Problem List Items Addressed This Visit    None  Visit Diagnoses       COVID-19    -  Primary    Relevant Medications    nirmatrelvir & ritonavir (Paxlovid, 300/100,) tablet therapy pack    Other Relevant Orders    [facility] Covid home test flowsheet    Covid at Home Test Kit (Completed)           Disposition:     I recommended COVID-19 PCR test 5 days after close contact exposure. Patient does not need to quarantine and should wear a high quality mask for 10 days. If testing on day 5 reveals you are positive, you should immediately isolate. Discussed symptom directed medication options with patient. Patient meets criteria for Paxlovid and they have been counseled appropriately regarding risks, benefits, side effects, and alternative treatment options. After discussion, patient agrees to treatment. Possible side effects of Paxlovid? Possible side effects of Paxlovid are:  - Liver Problems. Notify us right away if you start to experience loss of appetite, yellowing of your skin and the whites of eyes (jaundice), dark-colored urine, pale colored stools and itchy skin, stomach area (abdominal) pain. - Resistance to HIV Medicines. If you have untreated HIV infection, Paxlovid may lead to some HIV medicines not working as well in the future. - Other possible side effects include: altered sense of taste, diarrhea, high blood pressure, or muscle aches. I have spent a total time of 15 minutes on the day of the encounter for this patient including discussing prognosis, instructions for management, risk factor reductions and counseling/coordination of care. Encounter provider: Lester Ann DO     Provider located at: [place]  [id number] [facility] [address]     Recent Visits  No visits were found meeting these conditions.   Showing recent visits within past 7 days and meeting all other requirements  Today's Visits  Date Type Provider Dept   10/11/23 Telemedicine DO Kevin Lomeli   Showing today's visits and meeting all other requirements  Future Appointments  No visits were found meeting these conditions. Showing future appointments within next 150 days and meeting all other requirements     This virtual check-in was done via [address] [place] [address] and patient was informed that this is a secure, HIPAA-compliant platform. She agrees to proceed. Patient agrees to participate in a virtual check in via telephone or video visit instead of presenting to the office to address urgent/immediate medical needs. Patient is aware this is a billable service. She acknowledged consent and understanding of privacy and security of the video platform. The patient has agreed to participate and understands they can discontinue the visit at any time. After connecting through [facility], the patient was identified by name and date of birth. Marylene Fielding was informed that this was a telemedicine visit and that the exam was being conducted confidentially over secure lines. My office door was closed. No one else was in the room. Marylene Fielding acknowledged consent and understanding of privacy and security of the telemedicine visit. I informed the patient that I have reviewed her record in Epic and presented the opportunity for her to ask any questions regarding the visit today. The patient agreed to participate. Verification of patient location:  Patient is located in the following state in which I hold an active license: [place]    Subjective: Marylene Fielding is a 58 y.o. female who is concerned about COVID-19. Patient's symptoms include fever, nasal congestion, rhinorrhea, myalgias and headache.  Patient denies cough, shortness of breath, abdominal pain, nausea and vomiting.     - Date of symptom onset: 10/8/2023      COVID-19 vaccination status: Fully vaccinated with booster    Exposure:   Contact with a person who is under investigation (PUI) for or who is positive for COVID-19 within the last 14 days?: No    Hospitalized recently for fever and/or lower respiratory symptoms?: No      Currently a healthcare worker that is involved in direct patient care?: No      Works in a special setting where the risk of COVID-19 transmission may be high? (this may include long-term care, correctional and [facility] facilities; homeless shelters; assisted-living facilities and group homes.): No      Resident in a special setting where the risk of COVID-19 transmission may be high? (this may include long-term care, correctional and [facility] facilities; homeless shelters; assisted-living facilities and group homes.): No      Lab Results   Component Value Date    Max Caba Not Detected 12/07/2020    [address] Positive (Patient Reported) (A) 10/11/2023       Review of Systems   Constitutional:  Positive for fever. Negative for unexpected weight change. HENT:  Positive for congestion, postnasal drip and rhinorrhea. Negative for sinus pressure. Respiratory:  Negative for cough, shortness of breath and wheezing. Cardiovascular:  Negative for chest pain and palpitations. Gastrointestinal:  Negative for abdominal pain, nausea and vomiting. Endocrine: Negative for polydipsia and polyuria. Genitourinary:  Negative for dysuria. Musculoskeletal:  Positive for myalgias. Neurological:  Positive for headaches. Negative for weakness. Psychiatric/Behavioral:  Negative for confusion and sleep disturbance. The patient is not nervous/anxious.       Current Outpatient Medications on File Prior to Visit   Medication Sig    CALCIUM PO Take 600 mg by mouth in the morning    citalopram (CeleXA) 40 mg tablet Take 1 tablet (40 mg total) by mouth daily    Multiple Vitamin (MULTI-VITAMIN DAILY PO)     Multiple Vitamins-Minerals (Hair Skin and Nails Formula) TABS Take by mouth    pramipexole (MIRAPEX) 0.25 mg tablet 1 1/2 tabs daily ciclopirox (LOPROX) 0.77 % cream Apply topically 2 (two) times a day Apply to bottom of right foot and in between right toes for 3 weeks       Objective:    Temp 100 °F (37.8 °C) (Oral)   Ht 5' 2.6" (1.59 m)   BMI 33.73 kg/m²        Physical Exam  Vitals and nursing note reviewed. Constitutional:       Appearance: Normal appearance. HENT:      Head: Normocephalic and atraumatic. Right Ear: External ear normal.      Left Ear: External ear normal.      Nose: Nose normal.   Eyes:      Extraocular Movements: Extraocular movements intact. Conjunctiva/sclera: Conjunctivae normal.   Pulmonary:      Comments: No conversational dyspnea or coughing noted during visit  Musculoskeletal:      Cervical back: Normal range of motion. Skin:     Comments: No facial flushing or edema noted   Neurological:      General: No focal deficit present. Mental Status: She is alert and oriented to person, place, and time.    Psychiatric:         Mood and Affect: Mood normal.         Behavior: Behavior normal.       Navas Purchase, DO

## 2023-10-24 ENCOUNTER — OFFICE VISIT (OUTPATIENT)
Dept: DERMATOLOGY | Facility: CLINIC | Age: 63
End: 2023-10-24
Payer: COMMERCIAL

## 2023-10-24 VITALS — WEIGHT: 184 LBS | BODY MASS INDEX: 32.6 KG/M2 | HEIGHT: 63 IN | TEMPERATURE: 98.6 F

## 2023-10-24 DIAGNOSIS — D22.9 MULTIPLE BENIGN MELANOCYTIC NEVI: ICD-10-CM

## 2023-10-24 DIAGNOSIS — Z85.828 HISTORY OF BASAL CELL CANCER: ICD-10-CM

## 2023-10-24 DIAGNOSIS — L81.4 LENTIGO: ICD-10-CM

## 2023-10-24 DIAGNOSIS — Z86.006 HISTORY OF MELANOMA IN SITU: Primary | ICD-10-CM

## 2023-10-24 DIAGNOSIS — D18.01 CHERRY ANGIOMA: ICD-10-CM

## 2023-10-24 DIAGNOSIS — L82.1 SEBORRHEIC KERATOSIS: ICD-10-CM

## 2023-10-24 PROCEDURE — 99213 OFFICE O/P EST LOW 20 MIN: CPT | Performed by: DERMATOLOGY

## 2023-10-24 NOTE — PATIENT INSTRUCTIONS
HISTORY OF MELANOMA IN SITU       Assessment and Plan:  Based on a thorough discussion of this condition and the management approach to it (including a comprehensive discussion of the known risks, side effects and potential benefits of treatment), the patient (family) agrees to implement the following specific plan:  Monitor for any changes  When outside we recommend using a wide brim hat, sunglasses, long sleeve and pants, sunscreen with SPF 18+ with reapplication every 2 hours, or SPF specific clothing   Continue skin exams every 6 months   Continue annual GYN, dental, and eye exams. What happens at follow-up? The main purpose of follow-up is to detect recurrences early (metastatic melanoma), but it also offers an opportunity to diagnose a new primary melanoma at the first possible opportunity. A second invasive melanoma occurs in 5-10% of melanoma patients and a new melanoma in situ is diagnosed in more than 20% of melanoma patients. Our practice makes the following recommendations for follow-up for patients with invasive melanoma. At-least "monthly" self-skin examinations   Routine skin checks by a board certified dermatologist  Follow-up intervals are "every 3 months" within 2 years of a new melanoma diagnosis; "every 6 months" between 2-4 years of a new melanoma diagnosis; and "annually" after 4 years of a new melanoma diagnosis  Individual patient's needs should be considered before an appropriate follow-up is offered  Provide education and support to help the patient adjust to their illness     Follow-up appointments should include:  A check of the scar where the primary melanoma was removed  Checking the regional lymph nodes  A general skin examination  A full physical examination at least annually by your primary care physician     In those with more advanced primary disease, follow-up may include:  Blood tests  Imaging: ultrasound, X-ray, CT, MRI and PET scan.      Most tests are not worthwhile for patients with stage 1 or 2 melanoma unless there are signs or symptoms of disease recurrence or metastasis. No tests are necessary for healthy patients who have remained well for five years or longer after removal of their melanoma. What is the outlook for patients with melanoma? Melanoma in situ is cured by excision because it has no potential to spread around the body. The risk of spread and ultimate death from invasive melanoma depends on several factors, but the main one is the Breslow thickness of the melanoma at the time it was surgically removed. Metastases are rare for melanomas < 0.75 mm and the risk for tumours 0.75-1 mm thick is about 5%. The risk steadily increases with thickness so that melanomas > 4 mm have a risk of metastasis of about 40%. Melanoma is a potentially serious type of skin cancer, in which there is uncontrolled growth of melanocytes (pigment cells). Melanoma is sometimes called malignant melanoma. Normal melanocytes are found in the basal layer of the epidermis (the outer layer of skin). Melanocytes produce a protein called melanin, which protects skin cells by absorbing ultraviolet (UV) radiation. Melanocytes are found in equal numbers in black and white skin, but melanocytes in black skin produce much more melanin. People with dark brown or black skin are very much less likely to be damaged by UV radiation than those with white skin.          HISTORY OF BASAL CELL CARCINOMA        Assessment and Plan:  Based on a thorough discussion of this condition and the management approach to it (including a comprehensive discussion of the known risks, side effects and potential benefits of treatment), the patient (family) agrees to implement the following specific plan:  Monitor for any changes  When outside we recommend using a wide brim hat, sunglasses, long sleeve and pants, sunscreen with SPF 69+ with reapplication every 2 hours, or SPF specific clothing        How can basal cell carcinoma be prevented? The most important way to prevent BCC is to avoid sunburn. This is especially important in childhood and early life. Fair skinned individuals and those with a personal or family history of BCC should protect their skin from sun exposure daily, year-round and lifelong. Stay indoors or under the shade in the middle of the day   Wear covering clothing   Apply high protection factor SPF50+ broad-spectrum sunscreens generously to exposed skin if outdoors   Avoid indoor tanning (sun beds, solaria)  Oral nicotinamide (vitamin B3) in a dose of 500 mg twice daily may reduce the number and severity of BCCs. What is the outlook for basal cell carcinoma? Most BCCs are cured by treatment. Cure is most likely if treatment is undertaken when the lesion is small. About 50% of people with BCC develop a second one within 3 years of the first. They are also at increased risk of other skin cancers, especially melanoma. Regular self-skin examinations and long-term annual skin checks by an experienced health professional are recommended. SEBORRHEIC KERATOSES  - Relevant exam: Scattered over the trunk/extremities are waxy brown to black plaques and papules with stuck on appearance and consistent dermoscopy  - Exam and clinical history consistent with seborrheic keratoses  - Counseled that these are benign growths that do not require treatment  - Counseled that removal of lesions is considered cosmetic and so would incur a fee should patient elect to move forward. MELANOCYTIC NEVI  -Relevant exam: Scattered over the trunk/extremities are homogenously pigmented brown macules and papules. ELM performed and without concerning findings.  No outliers unless otherwise noted in today's note  - Exam and clinical history consistent with melanocytic nevi  - Educated on the ABCDE's of melanoma; handout provided  - Counseled to return to clinic prior to scheduled appointment should any of these lesions change or should any new lesions of concern arise  - Counseled on use of sun protection daily. Reviewed latest FDA sunscreen guidelines, including use of broad spectrum (UVA and UVB blocking) sunscreen or sun protective clothing with SPF 30-50 every 2-3 hours and reapplied after exposure to water; use of photoprotective clothing, including a broad brim hat and UPF rated clothing if outdoors for several hours; avoid use of tanning beds as these pose significant risk for melanoma and skin cancer. LENTIGINES  OTHER SKIN CHANGES DUE TO CHRONIC EXPOSURE TO NONIONIZING RADIATION  - Relevant exam: Over sun exposed areas are brown macules. ELM performed and without concerning findings. - Exam and clinical history consistent with lentigines. - Educated that these are indicative of prior sun exposure. - Counseled to return to clinic prior to scheduled appointment should any of these lesions change or should any new lesions of concern arise.  - Recommended use of sunscreen as above and below. - Counseled on use of sun protection daily. Reviewed latest FDA sunscreen guidelines, including use of broad spectrum (UVA and UVB blocking) sunscreen or sun protective clothing with SPF 30-50 every 2-3 hours and reapplied after exposure to water; use of photoprotective clothing, including a broad brim hat and UPF rated clothing if outdoors for several hours; avoid use of tanning beds as these pose significant risk for melanoma and skin cancer. CHERRY ANGIOMAS  - Relevant exam: Scattered over the trunk/extremities are red papules  - Exam and clinical history consistent with cherry angiomas  - Educated that these are benign  - Educated that removal is considered aesthetic and would incur a fee.

## 2023-10-24 NOTE — PROGRESS NOTES
[facility] Note     Patient Name: Andrzej Bell  Encounter Date: 10/24/2023     Have you been cared for by a [facility] D[facility]atologist in the last 3 years and, if so, which description applies to you? Yes. I have been here within the last 3 years, and my medical history has NOT changed since that time. I am FEMALE/of child-bearing potential.    REVIEW OF SYSTEMS:  Have you recently had or currently have any of the following? No changes in my recent health. PAST MEDICAL HISTORY:  Have you personally ever had or currently have any of the following? If "YES," then please provide more detail. No changes in my medical history. HISTORY OF IMMUNOSUPPRESSION: Do you have a history of any of the following:  Systemic Immunosuppression such as Diabetes, Biologic or Immunotherapy, Chemotherapy, Organ Transplantation, Bone Marrow Transplantation? No     Answering "YES" requires the addition of the dotphrase "IMMUNOSUPPRESSED" as the first diagnosis of the patient's visit. FAMILY HISTORY:  Any "first degree relatives" (parent, brother, sister, or child) with the following? No changes in my family's known health. PATIENT EXPERIENCE:    Do you want the Dermatologist to perform a COMPLETE skin exam today including a clinical examination under the "bra and underwear" areas? Yes  If necessary, do we have your permission to call and leave a detailed message on your Preferred Phone number that includes your specific medical information?   Yes      Allergies   Allergen Reactions    Monistat [Tioconazole] Other (See Comments)     Severe vaginal burning    Benzoin Rash    Meloxicam Rash    Nickel Itching and Rash      Current Outpatient Medications:     CALCIUM PO, Take 600 mg by mouth in the morning, Disp: , Rfl:     citalopram (CeleXA) 40 mg tablet, Take 1 tablet (40 mg total) by mouth daily, Disp: 90 tablet, Rfl: 0    Multiple Vitamin (MULTI-VITAMIN DAILY PO), , Disp: , Rfl:     Multiple Vitamins-Minerals (Hair Skin and Nails Formula) TABS, Take by mouth, Disp: , Rfl:     pramipexole (MIRAPEX) 0.25 mg tablet, 1 1/2 tabs daily, Disp: 135 tablet, Rfl: 3    ciclopirox (LOPROX) 0.77 % cream, Apply topically 2 (two) times a day Apply to bottom of right foot and in between right toes for 3 weeks, Disp: 90 g, Rfl: 1          Whom besides the patient is providing clinical information about today's encounter? NO ADDITIONAL HISTORIAN (patient alone provided history)    Physical Exam and Assessment/Plan by Diagnosis:     HISTORY OF MELANOMA IN SITU     Physical Exam:  Anatomic Location Affected:  Left upper arm  Morphological Description of Scar:  Well healed scar  Year Treated: 04/2018  Suspected Recurrence: no  Regional adenopathy: no     Additional History of Present Condition:  History of melanoma in situ with no sign of recurrence . Patient UTD on GYN, dental, eye exams. Assessment and Plan:  Based on a thorough discussion of this condition and the management approach to it (including a comprehensive discussion of the known risks, side effects and potential benefits of treatment), the patient (family) agrees to implement the following specific plan:  Monitor for any changes  When outside we recommend using a wide brim hat, sunglasses, long sleeve and pants, sunscreen with SPF 96+ with reapplication every 2 hours, or SPF specific clothing   Continue skin exams every 6 months   Continue annual GYN, dental, and eye exams. What happens at follow-up? The main purpose of follow-up is to detect recurrences early (metastatic melanoma), but it also offers an opportunity to diagnose a new primary melanoma at the first possible opportunity. A second invasive melanoma occurs in 5-10% of melanoma patients and a new melanoma in situ is diagnosed in more than 20% of melanoma patients. Our practice makes the following recommendations for follow-up for patients with invasive melanoma.   At-least "monthly" self-skin examinations   Routine skin checks by a board certified dermatologist  Follow-up intervals are "every 3 months" within 2 years of a new melanoma diagnosis; "every 6 months" between 2-4 years of a new melanoma diagnosis; and "annually" after 4 years of a new melanoma diagnosis  Individual patient's needs should be considered before an appropriate follow-up is offered  Provide education and support to help the patient adjust to their illness     Follow-up appointments should include:  A check of the scar where the primary melanoma was removed  Checking the regional lymph nodes  A general skin examination  A full physical examination at least annually by your primary care physician     In those with more advanced primary disease, follow-up may include:  Blood tests  Imaging: ultrasound, X-ray, CT, MRI and PET scan. Most tests are not worthwhile for patients with stage 1 or 2 melanoma unless there are signs or symptoms of disease recurrence or metastasis. No tests are necessary for healthy patients who have remained well for five years or longer after removal of their melanoma. What is the outlook for patients with melanoma? Melanoma in situ is cured by excision because it has no potential to spread around the body. The risk of spread and ultimate death from invasive melanoma depends on several factors, but the main one is the Breslow thickness of the melanoma at the time it was surgically removed. Metastases are rare for melanomas < 0.75 mm and the risk for tumours 0.75-1 mm thick is about 5%. The risk steadily increases with thickness so that melanomas > 4 mm have a risk of metastasis of about 40%. Melanoma is a potentially serious type of skin cancer, in which there is uncontrolled growth of melanocytes (pigment cells). Melanoma is sometimes called malignant melanoma. Normal melanocytes are found in the basal layer of the epidermis (the outer layer of skin).  Melanocytes produce a protein called melanin, which protects skin cells by absorbing ultraviolet (UV) radiation. Melanocytes are found in equal numbers in black and white skin, but melanocytes in black skin produce much more melanin. People with dark brown or black skin are very much less likely to be damaged by UV radiation than those with white skin. HISTORY OF BASAL CELL CARCINOMA     Physical Exam:  Anatomic Location Affected:  Right Upper back   Morphological Description of scar:  Atrophic scar s/p ILK; NER  Suspected Recurrence: No  Pertinent Positives:  Pertinent Negatives: Additional History of Present Condition:  History of basal cell carcinoma with no sign of recurrence but patient reports itching at the site. Assessment and Plan:  Based on a thorough discussion of this condition and the management approach to it (including a comprehensive discussion of the known risks, side effects and potential benefits of treatment), the patient (family) agrees to implement the following specific plan:  Monitor for any changes  When outside we recommend using a wide brim hat, sunglasses, long sleeve and pants, sunscreen with SPF 38+ with reapplication every 2 hours, or SPF specific clothing        How can basal cell carcinoma be prevented? The most important way to prevent BCC is to avoid sunburn. This is especially important in childhood and early life. Fair skinned individuals and those with a personal or family history of BCC should protect their skin from sun exposure daily, year-round and lifelong. Stay indoors or under the shade in the middle of the day   Wear covering clothing   Apply high protection factor SPF50+ broad-spectrum sunscreens generously to exposed skin if outdoors   Avoid indoor tanning (sun beds, solaria)  Oral nicotinamide (vitamin B3) in a dose of 500 mg twice daily may reduce the number and severity of BCCs. What is the outlook for basal cell carcinoma? Most BCCs are cured by treatment.  Cure is most likely if treatment is undertaken when the lesion is small. About 50% of people with BCC develop a second one within 3 years of the first. They are also at increased risk of other skin cancers, especially melanoma. Regular self-skin examinations and long-term annual skin checks by an experienced health professional are recommended. SEBORRHEIC KERATOSES  - Relevant exam: Scattered over the trunk/extremities are waxy brown to black plaques and papules with stuck on appearance and consistent dermoscopy  - Exam and clinical history consistent with seborrheic keratoses  - Counseled that these are benign growths that do not require treatment  - Counseled that removal of lesions is considered cosmetic and so would incur a fee should patient elect to move forward. MELANOCYTIC NEVI  -Relevant exam: Scattered over the trunk/extremities are homogenously pigmented brown macules and papules. ELM performed and without concerning findings. No outliers unless otherwise noted in today's note  - Exam and clinical history consistent with melanocytic nevi  - Educated on the ABCDE's of melanoma; handout provided  - Counseled to return to clinic prior to scheduled appointment should any of these lesions change or should any new lesions of concern arise  - Counseled on use of sun protection daily. Reviewed latest FDA sunscreen guidelines, including use of broad spectrum (UVA and UVB blocking) sunscreen or sun protective clothing with SPF 30-50 every 2-3 hours and reapplied after exposure to water; use of photoprotective clothing, including a broad brim hat and UPF rated clothing if outdoors for several hours; avoid use of tanning beds as these pose significant risk for melanoma and skin cancer. LENTIGINES  OTHER SKIN CHANGES DUE TO CHRONIC EXPOSURE TO NONIONIZING RADIATION  - Relevant exam: Over sun exposed areas are brown macules. ELM performed and without concerning findings.   - Exam and clinical history consistent with lentigines. - Educated that these are indicative of prior sun exposure. - Counseled to return to clinic prior to scheduled appointment should any of these lesions change or should any new lesions of concern arise.  - Recommended use of sunscreen as above and below. - Counseled on use of sun protection daily. Reviewed latest FDA sunscreen guidelines, including use of broad spectrum (UVA and UVB blocking) sunscreen or sun protective clothing with SPF 30-50 every 2-3 hours and reapplied after exposure to water; use of photoprotective clothing, including a broad brim hat and UPF rated clothing if outdoors for several hours; avoid use of tanning beds as these pose significant risk for melanoma and skin cancer. CHERRY ANGIOMAS  - Relevant exam: Scattered over the trunk/extremities are red papules  - Exam and clinical history consistent with cherry angiomas  - Educated that these are benign  - Educated that removal is considered aesthetic and would incur a fee.         Scribe Attestation      I,:  Ana Orr MA am acting as a scribe while in the presence of the attending physician.:       I,:  Melissa Stephens MD personally performed the services described in this documentation    as scribed in my presence.:

## 2023-12-08 ENCOUNTER — HOSPITAL ENCOUNTER (OUTPATIENT)
Dept: RADIOLOGY | Age: 63
Discharge: HOME | End: 2023-12-08
Payer: COMMERCIAL

## 2023-12-08 VITALS — WEIGHT: 184 LBS | BODY MASS INDEX: 32.6 KG/M2 | HEIGHT: 63 IN

## 2023-12-08 DIAGNOSIS — Z12.31 SCREENING MAMMOGRAM, ENCOUNTER FOR: ICD-10-CM

## 2023-12-08 PROCEDURE — 77067 SCR MAMMO BI INCL CAD: CPT

## 2023-12-08 PROCEDURE — 77063 BREAST TOMOSYNTHESIS BI: CPT

## 2024-01-04 ENCOUNTER — TELEPHONE (OUTPATIENT)
Age: 64
End: 2024-01-04

## 2024-01-04 DIAGNOSIS — F33.0 MILD EPISODE OF RECURRENT MAJOR DEPRESSIVE DISORDER (HCC): ICD-10-CM

## 2024-01-04 RX ORDER — CITALOPRAM 40 MG/1
40 TABLET ORAL DAILY
Qty: 90 TABLET | Refills: 1 | Status: SHIPPED | OUTPATIENT
Start: 2024-01-04

## 2024-01-04 NOTE — TELEPHONE ENCOUNTER
Patient called requesting an appointment for spot of concern on face  with Dr. Powell , has been present for few weeks , changing size . Patient has history of skin cancer , was told per Dr. Powell that she can be scheduled during our [facility] .

## 2024-01-04 NOTE — TELEPHONE ENCOUNTER
Reason for call:   [x] Refill   [] Prior Auth  [] Other:     Office:   [x] PCP/Provider - Fredis BROOKS / [facility]  [] Specialty/Provider -     Medication: citalopram    Dose/Frequency: 40mg qd    Quantity: 90    [facility] [place] - [address]     Does the patient have enough for 3 days?   [x] Yes   [] No - Send as HP to POD

## 2024-01-09 ENCOUNTER — TELEPHONE (OUTPATIENT)
Age: 64
End: 2024-01-09

## 2024-01-09 NOTE — TELEPHONE ENCOUNTER
Reason for call:   [x] Refill   [] Prior Auth  [] Other:     Office:   [x] PCP/Provider - Trisha Gonzalez MD   [] Specialty/Provider -     Medication:  Amoxicillin 500mg   Take 4(four) 500 mg capsules by mouth 1 hour prior to dental procedure     4 caps    Pharmacy: [facility]    Does the patient have enough for 3 days?   [] Yes   [x] No - Send as HP to POD

## 2024-01-10 DIAGNOSIS — Z96.642 STATUS POST TOTAL REPLACEMENT OF LEFT HIP: Primary | ICD-10-CM

## 2024-01-10 RX ORDER — AMOXICILLIN 500 MG/1
CAPSULE ORAL
Qty: 4 CAPSULE | Refills: 3 | Status: SHIPPED | OUTPATIENT
Start: 2024-01-10 | End: 2024-01-11

## 2024-01-16 ENCOUNTER — TELEPHONE (OUTPATIENT)
Dept: DERMATOLOGY | Facility: CLINIC | Age: 64
End: 2024-01-16

## 2024-01-16 NOTE — TELEPHONE ENCOUNTER
-r/s from 1/23/24 [facility] (), left v/m with new appt info and asked for pt to c/b to confirm or r/s if needed

## 2024-01-30 ENCOUNTER — OFFICE VISIT (OUTPATIENT)
Dept: DERMATOLOGY | Facility: CLINIC | Age: 64
End: 2024-01-30
Payer: COMMERCIAL

## 2024-01-30 VITALS — WEIGHT: 180 LBS | HEIGHT: 63 IN | BODY MASS INDEX: 31.89 KG/M2 | TEMPERATURE: 97.1 F

## 2024-01-30 DIAGNOSIS — L57.8 OTHER SKIN CHANGES DUE TO CHRONIC EXPOSURE TO NONIONIZING RADIATION: ICD-10-CM

## 2024-01-30 DIAGNOSIS — L81.4 SOLAR LENTIGO: ICD-10-CM

## 2024-01-30 DIAGNOSIS — L82.1 SEBORRHEIC KERATOSIS: ICD-10-CM

## 2024-01-30 DIAGNOSIS — L57.0 KERATOSIS, ACTINIC: Primary | ICD-10-CM

## 2024-01-30 PROCEDURE — 17000 DESTRUCT PREMALG LESION: CPT | Performed by: DERMATOLOGY

## 2024-01-30 PROCEDURE — 99213 OFFICE O/P EST LOW 20 MIN: CPT | Performed by: DERMATOLOGY

## 2024-01-30 RX ORDER — AMOXICILLIN 500 MG/1
CAPSULE ORAL
COMMUNITY
Start: 2024-01-16

## 2024-01-30 NOTE — PROGRESS NOTES
"[facility] Note     Patient Name: Charlotte Portillo  Encounter Date: 1/30/2024    Have you been cared for by a [facility] Dermatologist in the last 3 years and, if so, which description applies to you?    Yes.  I have been here within the last 3 years, and my medical history has NOT changed since that time.  I am FEMALE/of child-bearing potential.    REVIEW OF SYSTEMS:  Have you recently had or currently have any of the following? No changes in my recent health.   PAST MEDICAL HISTORY:  Have you personally ever had or currently have any of the following?  If \"YES,\" then please provide more detail. No changes in my medical history.   HISTORY OF IMMUNOSUPPRESSION: Do you have a history of any of the following:  Systemic Immunosuppression such as Diabetes, Biologic or Immunotherapy, Chemotherapy, Organ Transplantation, Bone Marrow Transplantation?  No     Answering \"YES\" requires the addition of the dotphrase \"IMMUNOSUPPRESSED\" as the first diagnosis of the patient's visit.   FAMILY HISTORY:  Any \"first degree relatives\" (parent, brother, sister, or child) with the following?    No changes in my family's known health.   PATIENT EXPERIENCE:    Do you want the Dermatologist to perform a COMPLETE skin exam today including a clinical examination under the \"bra and underwear\" areas?  NO  If necessary, do we have your permission to call and leave a detailed message on your Preferred Phone number that includes your specific medical information?  Yes      Allergies   Allergen Reactions    Monistat [Tioconazole] Other (See Comments)     Severe vaginal burning    Benzoin Rash    Meloxicam Rash    Nickel Itching and Rash      Current Outpatient Medications:     amoxicillin (AMOXIL) 500 mg capsule, , Disp: , Rfl:     CALCIUM PO, Take 600 mg by mouth in the morning, Disp: , Rfl:     citalopram (CeleXA) 40 mg tablet, Take 1 tablet (40 mg total) by mouth daily, Disp: 90 tablet, Rfl: 1    Multiple Vitamin (MULTI-VITAMIN " DAILY PO), , Disp: , Rfl:     Multiple Vitamins-Minerals (Hair Skin and Nails Formula) TABS, Take by mouth, Disp: , Rfl:     pramipexole (MIRAPEX) 0.25 mg tablet, 1 1/2 tabs daily, Disp: 135 tablet, Rfl: 3    ciclopirox (LOPROX) 0.77 % cream, Apply topically 2 (two) times a day Apply to bottom of right foot and in between right toes for 3 weeks, Disp: 90 g, Rfl: 1          Whom besides the patient is providing clinical information about today's encounter?   NO ADDITIONAL HISTORIAN (patient alone provided history)    Physical Exam and Assessment/Plan by Diagnosis:      ACTINIC KERATOSES  Physical Exam:  Anatomic Location Affected: right temple   Morphological Description:  Thin pink papule(s) with gritty scale       Assessment and Plan:  Based on a thorough discussion of this condition and the management approach to it (including a comprehensive discussion of the known risks, side effects and potential benefits of treatment), the patient (family) agrees to implement the following specific plan:  Treated with cryotherapy today; written and verbal consent obtained     PROCEDURE:  DESTRUCTION OF PRE-MALIGNANT LESIONS  After a thorough discussion of treatment options and risk/benefits/alternatives (including but not limited to local pain, scarring, dyspigmentation, blistering, and possible superinfection), verbal and written consent were obtained and the aforementioned lesions were treated on with cryotherapy using liquid nitrogen x 2 cycles for 5-10 seconds. The patient tolerated the procedure well, and after-care instructions were provided.     TOTAL NUMBER of 1 pre-malignant lesions were treated today on the ANATOMIC LOCATION: right temple .       Patient instructions:  Your pre-cancerous lesions (called actinic keratosis) were treated with liquid nitrogen today. The treated areas will get more red, crusted over the next few days. There might be some blistering. Apply vaseline to the treated area for the next week to  "help it heal fully. Do not pick at the area. Return in 3-4 weeks for another round of liquid nitrogen treatment if lesion(s)  fails to fully resolve.            SEBORRHEIC KERATOSIS; NON-INFLAMED     Physical Exam:  Anatomic Location Affected:  right temple  Morphological Description:  Waxy, smooth to warty textured, yellow to brownish-grey to dark brown to blackish, discrete, \"stuck-on\" appearing papules.  Present for years. Denies pain, itch, bleeding.      Additional History of Present Condition:  Present constantly; no modifying factors which make it worse or better. No prior treatment.       Assessment and Plan:  Based on a thorough discussion of this condition and the management approach to it (including a comprehensive discussion of the known risks, side effects and potential benefits of treatment), the patient (family) agrees to implement the following specific plan:  Reassure benign  Use sun protection.  Apply SPF 30 or higher at least three times a day.  Wear sun protecting clothing and hats.        SOLAR LENTIGINES   OTHER SKIN CHANGES DUE TO CHRONIC EXPOSURE TO NONIONIZING RADIATION     Physical Exam:  Anatomic Location Affected:  right temple  Morphological Description:  Multiple scattered brown to tan evenly pigmented macules   Denies pain, itch, bleeding. No treatments tried. Present for months - years. Reports getting newer lesions with sun exposure.         Assessment and Plan:  Based on a thorough discussion of this condition and the management approach to it (including a comprehensive discussion of the known risks, side effects and potential benefits of treatment), the patient (family) agrees to implement the following specific plan:  Reassure benign  Use sun protection.  Apply SPF 30 or higher at least three times a day.  Wear sun protecting clothing and hats.                Worrisome signs of skin malignancy discussed, questions answered. Regular self-skin check discussed. Advised to call or " return to office if patient notices any spots of concern, rapidly growing/changing lesions, bleeding lesions, non-healing lesions. Advised regular SPF use.        Scribe Attestation      I,:  Josefa Us MA am acting as a scribe while in the presence of the attending physician.:       I,:  Mariann Eubanks MD personally performed the services described in this documentation    as scribed in my presence.:

## 2024-01-30 NOTE — PATIENT INSTRUCTIONS
Patient instructions:  Your pre-cancerous lesions (called actinic keratosis) were treated with liquid nitrogen today. The treated areas will get more red, crusted over the next few days. There might be some blistering. Apply vaseline to the treated area for the next week to help it heal fully. Do not pick at the area. Return in 3-4 weeks for another round of liquid nitrogen treatment if lesion(s)  fails to fully resolve.

## 2024-03-15 ENCOUNTER — OFFICE VISIT (OUTPATIENT)
Dept: OBGYN CLINIC | Facility: CLINIC | Age: 64
End: 2024-03-15
Payer: COMMERCIAL

## 2024-03-15 ENCOUNTER — APPOINTMENT (OUTPATIENT)
Dept: RADIOLOGY | Facility: AMBULARY SURGERY CENTER | Age: 64
End: 2024-03-15
Attending: ORTHOPAEDIC SURGERY
Payer: COMMERCIAL

## 2024-03-15 VITALS
WEIGHT: 184 LBS | DIASTOLIC BLOOD PRESSURE: 70 MMHG | HEART RATE: 63 BPM | HEIGHT: 63 IN | BODY MASS INDEX: 32.6 KG/M2 | SYSTOLIC BLOOD PRESSURE: 109 MMHG

## 2024-03-15 DIAGNOSIS — Z96.642 STATUS POST TOTAL REPLACEMENT OF LEFT HIP: ICD-10-CM

## 2024-03-15 DIAGNOSIS — Z96.642 STATUS POST TOTAL REPLACEMENT OF LEFT HIP: Primary | ICD-10-CM

## 2024-03-15 PROCEDURE — 99213 OFFICE O/P EST LOW 20 MIN: CPT | Performed by: ORTHOPAEDIC SURGERY

## 2024-03-15 PROCEDURE — 73502 X-RAY EXAM HIP UNI 2-3 VIEWS: CPT

## 2024-03-15 RX ORDER — TRAZODONE HYDROCHLORIDE 50 MG/1
TABLET ORAL
COMMUNITY
Start: 2024-03-07

## 2024-03-15 RX ORDER — NALTREXONE HYDROCHLORIDE 50 MG/1
TABLET, FILM COATED ORAL
COMMUNITY
Start: 2024-03-14

## 2024-03-15 NOTE — PROGRESS NOTES
Assessment:   Diagnosis ICD-10-CM Associated Orders   1. Status post total replacement of left hip  Z96.642 XR hip/pelv 2-3 vws left if performed          Plan:  New x-rays of the left hip/pelvis were obtained today and reviewed with the patient noting that the prosthesis is in good anatomic position with no signs of loosening, dislocation or fracture.  Patient continues to have full nonpainful range of motion and has the ability to complete all her ADLs and activities without pain.  Reminded the patient to take antibiotics prior to any dental cleanings or procedures.  Dental letter was provided in the patient's AVS.    To do next visit:  Return if symptoms worsen or fail to improve.    The above stated was discussed in layman's terms and the patient expressed understanding.  All questions were answered to the patient's satisfaction.       Scribe Attestation      I,:  Marcella Coppola am acting as a scribe while in the presence of the attending physician.:       I,:  Trisha Gonzalez MD personally performed the services described in this documentation    as scribed in my presence.:               Subjective:   Charlotte Portillo is a 63 y.o. female who presents today for a follow-up for her left anterior total hip arthroplasty, date of surgery 12/13/2021. She has been doing well and is happy with her hip. She is able to do yoga, biking, hiking. She is hoping to start paddle boarding.       Review of systems negative unless otherwise specified in HPI  Review of Systems   Constitutional:  Negative for chills, fever and unexpected weight change.   HENT:  Negative for hearing loss, nosebleeds and sore throat.    Eyes:  Negative for pain, redness and visual disturbance.   Respiratory:  Negative for cough, shortness of breath and wheezing.    Cardiovascular:  Negative for chest pain, palpitations and leg swelling.   Gastrointestinal:  Negative for abdominal pain and nausea.   Genitourinary:  Negative for dyspareunia, dysuria  and frequency.   Skin:  Negative for rash and wound.   Neurological:  Negative for dizziness, numbness and headaches.   Psychiatric/Behavioral:  Negative for decreased concentration and suicidal ideas. The patient is not nervous/anxious.        Past Medical History:   Diagnosis Date    Anxiety     Arthritis     Basal cell carcinoma 2022    Right shoulder, excision     Cancer (HCC)     Depression     Melanoma (HCC) 2018    Left upper arm    PONV (postoperative nausea and vomiting)     Primary localized osteoarthritis of left hip 10/31/2018       Past Surgical History:   Procedure Laterality Date     SECTION       SECTION  1994    COLONOSCOPY  2022    3 years    FL INJECTION LEFT HIP (ARTHROGRAM)  10/24/2018    FL INJECTION LEFT HIP (NON ARTHROGRAM)  2018    FL INJECTION LEFT HIP (NON ARTHROGRAM)  2019    FL INJECTION LEFT HIP (NON ARTHROGRAM)  2019    FL INJECTION LEFT HIP (NON ARTHROGRAM)  2019    FL INJECTION LEFT HIP (NON ARTHROGRAM)  2020    FL INJECTION LEFT HIP (NON ARTHROGRAM)  2020    FL INJECTION LEFT HIP (NON ARTHROGRAM)  2021    KNEE ARTHROSCOPY      [place] ARTHRP ACETBLR/PROX FEM PROSTC AGRFT/ALGRFT Left 2021    Procedure: ARTHROPLASTY HIP TOTAL ANTERIOR;  Surgeon: Trisha Gonzalez MD;  Location: [facility];  Service: Orthopedics    ROTATOR CUFF REPAIR      SHOULDER SURGERY      SKIN BIOPSY  2022    WISDOM TOOTH EXTRACTION         Family History   Problem Relation Age of Onset    Arthritis Mother     Hypertension Mother     Scoliosis Mother     Hyperlipidemia Mother     Heart attack Mother     Arthritis Father     Heart disease Father         CARDIAC DISORDER    Heart attack Father     Diabetes Sister         borderline    No Known Problems Sister     Alzheimer's disease Maternal Grandmother     Stroke Maternal Grandfather     No Known Problems Paternal Grandmother     No Known Problems Paternal Grandfather     No Known  Problems Brother     No Known Problems Brother     No Known Problems Son     No Known Problems Son     No Known Problems Son     Birth defects Maternal Aunt     Ovarian cancer Maternal Aunt 50    Breast cancer Maternal Aunt 70    Heart attack Maternal Aunt     Birth defects Paternal Aunt     Lung cancer Paternal Aunt 60       Social History     Occupational History     Comment: FULL-TIME EMPLOYMENT   Tobacco Use    Smoking status: Former     Current packs/day: 0.00     Types: Cigarettes     Quit date:      Years since quittin.2    Smokeless tobacco: Never   Vaping Use    Vaping status: Never Used   Substance and Sexual Activity    Alcohol use: Yes     Comment: weekends    Drug use: No    Sexual activity: Not Currently     Birth control/protection: Post-menopausal, Abstinence     Comment:          Current Outpatient Medications:     amoxicillin (AMOXIL) 500 mg capsule, , Disp: , Rfl:     CALCIUM PO, Take 600 mg by mouth in the morning, Disp: , Rfl:     citalopram (CeleXA) 40 mg tablet, Take 1 tablet (40 mg total) by mouth daily, Disp: 90 tablet, Rfl: 1    Multiple Vitamin (MULTI-VITAMIN DAILY PO), , Disp: , Rfl:     Multiple Vitamins-Minerals (Hair Skin and Nails Formula) TABS, Take by mouth, Disp: , Rfl:     naltrexone (REVIA) 50 mg tablet, , Disp: , Rfl:     pramipexole (MIRAPEX) 0.25 mg tablet, 1 1/2 tabs daily, Disp: 135 tablet, Rfl: 3    traZODone (DESYREL) 50 mg tablet, , Disp: , Rfl:     ciclopirox (LOPROX) 0.77 % cream, Apply topically 2 (two) times a day Apply to bottom of right foot and in between right toes for 3 weeks, Disp: 90 g, Rfl: 1    Allergies   Allergen Reactions    Monistat [Tioconazole] Other (See Comments)     Severe vaginal burning    Benzoin Rash    Meloxicam Rash    Nickel Itching and Rash            Vitals:    03/15/24 0808   BP: 109/70   Pulse: 63       Body mass index is 33.01 kg/m².  Wt Readings from Last 3 Encounters:   03/15/24 83.5 kg (184 lb)   24 81.6 kg (180  "lb)   12/08/23 83.5 kg (184 lb)       Objective:                    Left Hip Exam     Tenderness   The patient is experiencing no tenderness.     Range of Motion   Flexion:  90   External rotation:  30   Internal rotation: 20     Muscle Strength   Abduction: 5/5   Adduction: 5/5   Flexion: 5/5     Other   Erythema: absent  Sensation: normal  Pulse: present    Comments:  Calf is soft and nontender            Diagnostics, reviewed and taken today if performed as documented:    The attending physician has personally reviewed the pertinent films in PACS and interpretation is as follows:  X-rays left hip/pelvis 2 views: Prosthesis is in good anatomic position with no signs of loosening, fracture or dislocation.    Procedures, if performed today:    Procedures    None performed      Portions of the record may have been created with voice recognition software.  Occasional wrong word or \"sound a like\" substitutions may have occurred due to the inherent limitations of voice recognition software.  Read the chart carefully and recognize, using context, where substitutions have occurred.  "

## 2024-03-15 NOTE — PATIENT INSTRUCTIONS
[facility]                                                                                               Dr. Trisha Gonzalez                                                                                                            ANTIBIOTIC USE FOR JOINT REPLACEMENT PATIENTS  You have had surgery to replace one of your joints with a metal prosthesis. Going forward, you should take oral antibiotics before any dental work, including routine cleanings. These procedures are a potential source of injection. If you develop an infection, it could spread to your operative joint and cause complications.  Please show the following guidelines to your medical doctor and dentist, so he/she can prescribe the appropriate medications.  The following information is recommended by the American Heart, Dental, and Orthopedic Associations:  STANDARD GENERAL PROPHYLAXIS  antibiotic prophylaxis recommended lifetime  Recommend refraining from dental procedures until 3 months post operatively  Amoxicillin for Adults:    500mg - Take 4 capsules (2 grams) orally one hour before the procedure  If allergic to Penicillin  Clindamycin for Adults:   300mg - Take 2 capsules (600 mg) orally one hour before the procedure  Azithromycin for Adults:  250mg - Take 2 capsules (500 mg) orally one hour before the procedure  Cephalexin for Adults:     500mg - Take 4 capsules (2 grams) orally one hour before the procedure  Note: Cephalosporins should not be used if you have ever developed an immediate hypersensitivity reaction (i.e., hives, swelling, severe itching, difficulty breathing) to Penicillin  Please feel free to contact our office at [phone] with questions or concerns.

## 2024-04-03 ENCOUNTER — HOSPITAL ENCOUNTER (OUTPATIENT)
Dept: RADIOLOGY | Facility: HOSPITAL | Age: 64
Discharge: HOME | End: 2024-04-03
Payer: COMMERCIAL

## 2024-04-03 ENCOUNTER — TELEPHONE (OUTPATIENT)
Dept: OBGYN CLINIC | Facility: CLINIC | Age: 64
End: 2024-04-03

## 2024-04-03 ENCOUNTER — OFFICE VISIT (OUTPATIENT)
Dept: OBGYN CLINIC | Facility: CLINIC | Age: 64
End: 2024-04-03
Payer: COMMERCIAL

## 2024-04-03 VITALS — BODY MASS INDEX: 34.6 KG/M2 | HEART RATE: 60 BPM | WEIGHT: 188 LBS | OXYGEN SATURATION: 96 % | HEIGHT: 62 IN

## 2024-04-03 DIAGNOSIS — M25.551 PAIN IN RIGHT HIP: Primary | ICD-10-CM

## 2024-04-03 DIAGNOSIS — M25.551 PAIN IN RIGHT HIP: ICD-10-CM

## 2024-04-03 PROCEDURE — 73502 X-RAY EXAM HIP UNI 2-3 VIEWS: CPT

## 2024-04-03 PROCEDURE — 99213 OFFICE O/P EST LOW 20 MIN: CPT | Performed by: PHYSICIAN ASSISTANT

## 2024-04-03 NOTE — PROGRESS NOTES
Assessment/Plan   Diagnoses and all orders for this visit:    Pain in right hip    Right hip OA    - Follow up with Dr. Arroyo or Dr. Burciaga for an US guided cortisone injection in early-  - We discussed Mobic because Aleve is causing dyspepsia, but she is allergic to it.  Recommend staying with Tylenol as needed  - Follow up with Dr. Gonzalez 3 months after the injeciton by Dr. Arroyo/Patrica              Subjective   Patient ID: Charlotte Portillo is a 63 y.o. female.    Vitals:    24 1724   Pulse: 60   SpO2: 96%     64yo female comes in for an evaluation of her right hip.  She has a history of a left hip JOSEPH by Dr. Gonzalez in .  For about a month, she has now been having right hip pain.  This feels similar in character to her, as the other hip did prior to surgery. The pain is dull in character, mild in severity, does not radiate and is not associated with numbness.  She had success with cortisone injections in the past.  She would like to schedule one for early- because she has a family vacation planned in late .  Aleve helps her hip pain, but it is causing dyspepsia.          The following portions of the patient's history were reviewed and updated as appropriate: allergies, current medications, past family history, past medical history, past social history, past surgical history, and problem list.    Review of Systems  Ortho Exam  Past Medical History:   Diagnosis Date    Anxiety     Arthritis     Basal cell carcinoma 2022    Right shoulder, excision     Cancer (HCC)     Depression     Melanoma (HCC) 2018    Left upper arm    PONV (postoperative nausea and vomiting)     Primary localized osteoarthritis of left hip 10/31/2018     Past Surgical History:   Procedure Laterality Date     SECTION       SECTION  1994    COLONOSCOPY  2022    3 years    FL INJECTION LEFT HIP (ARTHROGRAM)  10/24/2018    FL INJECTION LEFT HIP (NON ARTHROGRAM)  2018    FL  INJECTION LEFT HIP (NON ARTHROGRAM)  2019    FL INJECTION LEFT HIP (NON ARTHROGRAM)  2019    FL INJECTION LEFT HIP (NON ARTHROGRAM)  2019    FL INJECTION LEFT HIP (NON ARTHROGRAM)  2020    FL INJECTION LEFT HIP (NON ARTHROGRAM)  2020    FL INJECTION LEFT HIP (NON ARTHROGRAM)  2021    KNEE ARTHROSCOPY      [place] ARTHRP ACETBLR/PROX FEM PROSTC AGRFT/ALGRFT Left 2021    Procedure: ARTHROPLASTY HIP TOTAL ANTERIOR;  Surgeon: Trisha Gonzalez MD;  Location[facility];  Service: Orthopedics    ROTATOR CUFF REPAIR      SHOULDER SURGERY      SKIN BIOPSY  2022    WISDOM TOOTH EXTRACTION       Family History   Problem Relation Age of Onset    Arthritis Mother     Hypertension Mother     Scoliosis Mother     Hyperlipidemia Mother     Heart attack Mother     Arthritis Father     Heart disease Father         CARDIAC DISORDER    Heart attack Father     Diabetes Sister         borderline    No Known Problems Sister     Alzheimer's disease Maternal Grandmother     Stroke Maternal Grandfather     No Known Problems Paternal Grandmother     No Known Problems Paternal Grandfather     No Known Problems Brother     No Known Problems Brother     No Known Problems Son     No Known Problems Son     No Known Problems Son     Birth defects Maternal Aunt     Ovarian cancer Maternal Aunt 50    Breast cancer Maternal Aunt 70    Heart attack Maternal Aunt     Birth defects Paternal Aunt     Lung cancer Paternal Aunt 60     Social History     Occupational History     Comment: FULL-TIME EMPLOYMENT   Tobacco Use    Smoking status: Former     Current packs/day: 0.00     Types: Cigarettes     Quit date:      Years since quittin.2    Smokeless tobacco: Never   Vaping Use    Vaping status: Never Used   Substance and Sexual Activity    Alcohol use: Yes     Comment: weekends    Drug use: No    Sexual activity: Not Currently     Birth control/protection: Post-menopausal, Abstinence     Comment:         Review of Systems   Constitutional: Negative.    HENT: Negative.    Eyes: Negative.    Respiratory: Negative.    Cardiovascular: Negative.    Gastrointestinal: Negative.    Endocrine: Negative.    Genitourinary: Negative.    Musculoskeletal: As below..   Allergic/Immunologic: Negative.    Neurological: Negative.    Hematological: Negative.    Psychiatric/Behavioral: Negative.        Objective   Physical Exam      Xrays:  I have personally reviewed pertinent films in PACS and my interpretation is mild OA.  The left JOSEPH is in place as expected.    Constitutional: Awake, Alert, Oriented  Eyes: EOMI  Psych: Mood and affect appropriate  Heart: regular rate   Lungs: No audible wheezing  Abdomen: No guarding  Lymph: no lymphedema      right Hip:  Appearance  No swelling, discoloration, deformity, or ecchymosis  Palpation  No tenderness about the SI joint, iliac crest, anterior hip, or greater trochanter  ROM  Flexion: 70, ER: 20, IR: 0, and Abduction: 20  Special Tests  Straight leg raise negative + pain with log rolling  Motor  normal 5/5 in all planes  NVI distally

## 2024-04-03 NOTE — TELEPHONE ENCOUNTER
Pt will need to be scheduled for an Ultrasound Guided injection in early to mid June per KEENAN Hawley.     # [phone].    Pt may also see Vikrum depending on availability.

## 2024-04-03 NOTE — TELEPHONE ENCOUNTER
After Pt left office, MIKE Hawley stated she is allergic to Mobic, but would be allergic to it. KEENAN Hawley stated to stick with Tylenol, etc.    Contacted Pt, and Pt understood instructions and thanked for the call back.

## 2024-04-23 ENCOUNTER — OFFICE VISIT (OUTPATIENT)
Dept: OBGYN CLINIC | Facility: OTHER | Age: 64
End: 2024-04-23
Payer: COMMERCIAL

## 2024-04-23 VITALS
SYSTOLIC BLOOD PRESSURE: 130 MMHG | DIASTOLIC BLOOD PRESSURE: 80 MMHG | HEIGHT: 62 IN | HEART RATE: 69 BPM | BODY MASS INDEX: 34.39 KG/M2

## 2024-04-23 DIAGNOSIS — M16.11 PRIMARY OSTEOARTHRITIS OF ONE HIP, RIGHT: ICD-10-CM

## 2024-04-23 DIAGNOSIS — M25.551 PAIN IN RIGHT HIP: Primary | ICD-10-CM

## 2024-04-23 PROCEDURE — 99213 OFFICE O/P EST LOW 20 MIN: CPT | Performed by: ORTHOPAEDIC SURGERY

## 2024-04-23 NOTE — PROGRESS NOTES
Chief Complaint: Right hip pain    HPI:    Charlotte Portillo is a 63year old Female who presents today for evaluation of right hip pain      Description of symptoms: Patient reports nontraumatic onset sharp right anterior hip pain which is intermittent made worse with trying to get up from a sitting position or crossing her legs.  Symptoms present for several months.  Reports some mild lateral hip discomfort as well.  Patient has a known history of left total hip replacement and was seen by orthopedic JACKI Garcia on 4/3/2024 for her right hip pain.  She was noted to have right hip osteoarthritis on the plain radiograph and was referred to me for consideration of right hip ultrasound-guided corticosteroid injection.    I have personally reviewed pertinent films in PACS.    Patient Active Problem List   Diagnosis    Anxiety    Depression    Hyperlipidemia    Lateral epicondylitis of right elbow    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    Status post total replacement of left hip    Restless legs syndrome (RLS)    Screening mammogram, encounter for    Cancer (HCC)        Current Outpatient Medications on File Prior to Visit   Medication Sig Dispense Refill    amoxicillin (AMOXIL) 500 mg capsule       CALCIUM PO Take 600 mg by mouth in the morning      citalopram (CeleXA) 40 mg tablet Take 1 tablet (40 mg total) by mouth daily 90 tablet 1    Multiple Vitamin (MULTI-VITAMIN DAILY PO)       Multiple Vitamins-Minerals (Hair Skin and Nails Formula) TABS Take by mouth      naltrexone (REVIA) 50 mg tablet       pramipexole (MIRAPEX) 0.25 mg tablet 1 1/2 tabs daily 135 tablet 3    traZODone (DESYREL) 50 mg tablet       ciclopirox (LOPROX) 0.77 % cream Apply topically 2 (two) times a day Apply to bottom of right foot and in between right toes for 3 weeks 90 g 1     No current facility-administered medications on file prior to visit.        Allergies   Allergen Reactions    Monistat [Tioconazole] Other (See Comments)      Severe vaginal burning    Benzoin Rash    Meloxicam Rash    Nickel Itching and Rash        Tobacco Use: Medium Risk (4/3/2024)    Patient History     Smoking Tobacco Use: Former     Smokeless Tobacco Use: Never     Passive Exposure: Not on file        Social Determinants of Health     Tobacco Use: Medium Risk (4/3/2024)    Patient History     Smoking Tobacco Use: Former     Smokeless Tobacco Use: Never     Passive Exposure: Not on file   Alcohol Use: Not At Risk (5/12/2022)    AUDIT-C     Frequency of Alcohol Consumption: 2-3 times a week     Average Number of Drinks: 1 or 2     Frequency of Binge Drinking: Never   Financial Resource Strain: Not on file   Food Insecurity: Not on file   Transportation Needs: Not on file   Physical Activity: Not on file   Stress: Not on file   Social Connections: Not on file   Intimate Partner Violence: Not on file   Depression: Not at risk (1/28/2021)    PHQ-2     PHQ-2 Score: 2   Housing Stability: Not on file   Utilities: Not on file   Health Literacy: Not on file               Review of Systems     Body mass index is 34.39 kg/m².     Physical Exam     Ortho Exam:    Body part: right hip    Inspection: No visible deformity    Palpation: Mild discomfort over the greater trochanter.  Also has tenderness of the anterior joint line.    Range of motion: Hip flexion is 90 degrees, external rotation is 50 degrees and internal rotation is 5 degrees    Special Tests: Positive IDALMIS.  Negative logroll.    Distal Neurovascular Status: Intact, Yes        Assessment:     Diagnosis ICD-10-CM Associated Orders   1. Pain in right hip  M25.551       2. Primary osteoarthritis of one hip, right  M16.11            Plan:    Explained by current clinical findings and reviewed the radiological findings with the patient today.  Clinical evaluation is consistent with right hip osteoarthritis.  We discussed both surgical as well as nonsurgical management options in this regard.  Patient wishes to pursue  "nonsurgical management and is willing to try ultrasound-guided corticosteroid injection of the right hip for which she will be scheduled accordingly.  The risks and benefits of this procedure were explained to the patient.  She expressed understanding and was in agreement with the treatment plan.            Portions of the record may have been created with voice recognition software. Occasional wrong word or \"sound alike\" substitutions may have occurred due to the inherent limitations of voice recognition software. Please review the chart carefully and recognize, using context, where substitutions/typographical errors may have occurred.           "

## 2024-04-30 ENCOUNTER — OFFICE VISIT (OUTPATIENT)
Dept: DERMATOLOGY | Facility: CLINIC | Age: 64
End: 2024-04-30
Payer: COMMERCIAL

## 2024-04-30 VITALS — TEMPERATURE: 97.4 F

## 2024-04-30 DIAGNOSIS — L21.9 SEBORRHEIC DERMATITIS: Primary | ICD-10-CM

## 2024-04-30 PROCEDURE — 99214 OFFICE O/P EST MOD 30 MIN: CPT | Performed by: DERMATOLOGY

## 2024-04-30 RX ORDER — CLOBETASOL PROPIONATE 0.46 MG/ML
SOLUTION TOPICAL
Qty: 50 ML | Refills: 4 | Status: SHIPPED | OUTPATIENT
Start: 2024-04-30

## 2024-04-30 RX ORDER — CLOBETASOL PROPIONATE 0.46 MG/ML
SOLUTION TOPICAL 2 TIMES DAILY
Qty: 50 ML | Refills: 3 | Status: CANCELLED | OUTPATIENT
Start: 2024-04-30

## 2024-04-30 NOTE — PROGRESS NOTES
"[facility] Note     Patient Name: Charlotte Portillo  Encounter Date: 4/30/24     Have you been cared for by a [facility] Dermatologist in the last 3 years and, if so, which description applies to you?    Yes.  I have been here within the last 3 years, and my medical history has NOT changed since that time.  I am FEMALE/of child-bearing potential.    REVIEW OF SYSTEMS:  Have you recently had or currently have any of the following? No changes in my recent health.   PAST MEDICAL HISTORY:  Have you personally ever had or currently have any of the following?  If \"YES,\" then please provide more detail. No changes in my medical history.   HISTORY OF IMMUNOSUPPRESSION: Do you have a history of any of the following:  Systemic Immunosuppression such as Diabetes, Biologic or Immunotherapy, Chemotherapy, Organ Transplantation, Bone Marrow Transplantation?  No     Answering \"YES\" requires the addition of the dotphrase \"IMMUNOSUPPRESSED\" as the first diagnosis of the patient's visit.   FAMILY HISTORY:  Any \"first degree relatives\" (parent, brother, sister, or child) with the following?    No changes in my family's known health.   PATIENT EXPERIENCE:    Do you want the Dermatologist to perform a COMPLETE skin exam today including a clinical examination under the \"bra and underwear\" areas?  Yes  If necessary, do we have your permission to call and leave a detailed message on your Preferred Phone number that includes your specific medical information?  Yes      Allergies   Allergen Reactions    Monistat [Tioconazole] Other (See Comments)     Severe vaginal burning    Benzoin Rash    Meloxicam Rash    Nickel Itching and Rash      Current Outpatient Medications:     amoxicillin (AMOXIL) 500 mg capsule, , Disp: , Rfl:     CALCIUM PO, Take 600 mg by mouth in the morning, Disp: , Rfl:     ciclopirox (LOPROX) 0.77 % cream, Apply topically 2 (two) times a day Apply to bottom of right foot and in between right toes for 3 " "weeks, Disp: 90 g, Rfl: 1    citalopram (CeleXA) 40 mg tablet, Take 1 tablet (40 mg total) by mouth daily, Disp: 90 tablet, Rfl: 1    Multiple Vitamin (MULTI-VITAMIN DAILY PO), , Disp: , Rfl:     Multiple Vitamins-Minerals (Hair Skin and Nails Formula) TABS, Take by mouth, Disp: , Rfl:     naltrexone (REVIA) 50 mg tablet, , Disp: , Rfl:     pramipexole (MIRAPEX) 0.25 mg tablet, 1 1/2 tabs daily, Disp: 135 tablet, Rfl: 3    traZODone (DESYREL) 50 mg tablet, , Disp: , Rfl:           Whom besides the patient is providing clinical information about today's encounter?   NO ADDITIONAL HISTORIAN (patient alone provided history)    Physical Exam and Assessment/Plan by Diagnosis:    HISTORY OF MELANOMA IN SITU    Physical Exam:  Anatomic Location Affected:  left upper arm  Morphological Description of Scar:  well healed  Year Treated: 04/2018  Suspected Recurrence: no  Regional adenopathy: no    Additional History of Present Condition:  History of melanoma in situ with no sign of reoccurrence.  Patient UTD on annual GYN, eye and dental screening.    Assessment and Plan:  Based on a thorough discussion of this condition and the management approach to it (including a comprehensive discussion of the known risks, side effects and potential benefits of treatment), the patient (family) agrees to implement the following specific plan:  Monitor changes  Sun protective clothing   Sunscreen SPF 30 or higher    What happens at follow-up?  The main purpose of follow-up is to detect recurrences early (metastatic melanoma), but it also offers an opportunity to diagnose a new primary melanoma at the first possible opportunity. A second invasive melanoma occurs in 5-10% of melanoma patients and a new melanoma in situ is diagnosed in more than 20% of melanoma patients.    Our practice makes the following recommendations for follow-up for patients with invasive melanoma.  At-least \"monthly\" self-skin examinations   Routine skin checks by a " "board certified dermatologist  Follow-up intervals are \"every 3 months\" within 2 years of a new melanoma diagnosis; \"every 6 months\" between 2-4 years of a new melanoma diagnosis; and \"annually\" after 4 years of a new melanoma diagnosis  Individual patient's needs should be considered before an appropriate follow-up is offered  Provide education and support to help the patient adjust to their illness    Follow-up appointments should include:  A check of the scar where the primary melanoma was removed  Checking the regional lymph nodes  A general skin examination  A full physical examination at least annually by your primary care physician    In those with more advanced primary disease, follow-up may include:  Blood tests  Imaging: ultrasound, X-ray, CT, MRI and PET scan.    Most tests are not worthwhile for patients with stage 1 or 2 melanoma unless there are signs or symptoms of disease recurrence or metastasis. No tests are necessary for healthy patients who have remained well for five years or longer after removal of their melanoma.    What is the outlook for patients with melanoma?  Melanoma in situ is cured by excision because it has no potential to spread around the body.  The risk of spread and ultimate death from invasive melanoma depends on several factors, but the main one is the Breslow thickness of the melanoma at the time it was surgically removed.  Metastases are rare for melanomas < 0.75 mm and the risk for tumours 0.75-1 mm thick is about 5%. The risk steadily increases with thickness so that melanomas > 4 mm have a risk of metastasis of about 40%.    Melanoma is a potentially serious type of skin cancer, in which there is uncontrolled growth of melanocytes (pigment cells). Melanoma is sometimes called malignant melanoma.  Normal melanocytes are found in the basal layer of the epidermis (the outer layer of skin). Melanocytes produce a protein called melanin, which protects skin cells by absorbing " ultraviolet (UV) radiation. Melanocytes are found in equal numbers in black and white skin, but melanocytes in black skin produce much more melanin. People with dark brown or black skin are very much less likely to be damaged by UV radiation than those with white skin.     HISTORY OF BASAL CELL CARCINOMA    Physical Exam:  Anatomic Location Affected:  right upper back  Morphological Description of scar:  well healed- slightly hypertrophic at inferior portion  Suspected Recurrence: No  Pertinent Positives:  Pertinent Negatives:      Additional History of Present Condition:  History of basal cell carcinoma with no sign of recurrence    Assessment and Plan:  Based on a thorough discussion of this condition and the management approach to it (including a comprehensive discussion of the known risks, side effects and potential benefits of treatment), the patient (family) agrees to implement the following specific plan:  Monitor changes  Sun protective clothing    How can basal cell carcinoma be prevented?  The most important way to prevent BCC is to avoid sunburn. This is especially important in childhood and early life. Fair skinned individuals and those with a personal or family history of BCC should protect their skin from sun exposure daily, year-round and lifelong.  Stay indoors or under the shade in the middle of the day   Wear covering clothing   Apply high protection factor SPF50+ broad-spectrum sunscreens generously to exposed skin if outdoors   Avoid indoor tanning (sun beds, solaria)  Oral nicotinamide (vitamin B3) in a dose of 500 mg twice daily may reduce the number and severity of BCCs.    What is the outlook for basal cell carcinoma?  Most BCCs are cured by treatment. Cure is most likely if treatment is undertaken when the lesion is small.  About 50% of people with BCC develop a second one within 3 years of the first. They are also at increased risk of other skin cancers, especially melanoma. Regular  self-skin examinations and long-term annual skin checks by an experienced health professional are recommended.     SEBORRHEIC KERATOSES  - Relevant exam: Scattered over the trunk/extremities are waxy brown to black plaques and papules with stuck on appearance and consistent dermoscopy  - Exam and clinical history consistent with seborrheic keratoses  - Counseled that these are benign growths that do not require treatment    MELANOCYTIC NEVI  -Relevant exam: Scattered over the trunk/extremities are homogenously pigmented brown macules and papules. ELM performed and without concerning findings. No outliers unless otherwise noted in today's note  - Exam and clinical history consistent with melanocytic nevi  - Counseled to return to clinic prior to scheduled appointment should any of these lesions change or should any new lesions of concern arise  - Counseled on use of sun protection daily. Reviewed latest FDA sunscreen guidelines, including use of broad spectrum (UVA and UVB blocking) sunscreen or sun protective clothing with SPF 30-50 every 2-3 hours and reapplied after exposure to water    LENTIGINES  OTHER SKIN CHANGES DUE TO CHRONIC EXPOSURE TO NONIONIZING RADIATION  - Relevant exam: Over sun exposed areas are brown macules. ELM performed and without concerning findings.  - Exam and clinical history consistent with lentigines.  - Counseled to return to clinic prior to scheduled appointment should any of these lesions change or should any new lesions of concern arise.  - Recommended use of sunscreen as above and below.    CHERRY ANGIOMAS  - Relevant exam: Scattered over the trunk/extremities are red papules  - Exam and clinical history consistent with cherry angiomas  - Educated that these are benign      SEBORRHEIC DERMATITIS     Physical Exam:  Anatomic Location Affected &  Morphological Description:  Greasy adherent scale/scaling plaques on the scalp    Additional History of Present Condition:  Pt notes increased  scaling on scalp for months.     Assessment and Plan:  Based on a thorough discussion of this condition and the management approach to it (including a comprehensive discussion of the known risks, side effects and potential benefits of treatment), the patient (family) agrees to implement the following specific plan:           SCALP  Start OTC anti-dandruff shampoo (Head & Shoulders, Selsun Blue, Neutrogena T-gel or T-Sal) 2x/week to damp scalp, let sit 10 minutes then rinse (may use normal shampoo/conditioner thereafter as desired) - allergy to tioconazole reported in chart so will avoid azoles  Apply the  steroid solution 1-2 times daily to dry scalp and leave it on. Use as needed for redness, scale and itching. After 2 weeks of use, stop and reassess, can restart if needed.     The chronic nature of this condition and association with normal skin yeast were reviewed. The goal of therapy is to control symptoms, but this is not typically something we cure and intermittent flares can be expected.    If severity merited by exam findings, topical steroids can be used for no longer than 1 week on face and 2 weeks on scalp (due to risks of atrophy, acne, etc) for significant flares for acute control, but azole therapy as above is the long term maintenance therapy of choice.    Scribe Attestation      I,:  Cam Castellon am acting as a scribe while in the presence of the attending physician.:       I,:  Angelika Poewll MD personally performed the services described in this documentation    as scribed in my presence.:

## 2024-04-30 NOTE — PATIENT INSTRUCTIONS
"HISTORY OF MELANOMA IN SITU      Assessment and Plan:  Based on a thorough discussion of this condition and the management approach to it (including a comprehensive discussion of the known risks, side effects and potential benefits of treatment), the patient (family) agrees to implement the following specific plan:  Monitor changes  Sun protective clothing   Sunscreen SPF 30 or higher    What happens at follow-up?  The main purpose of follow-up is to detect recurrences early (metastatic melanoma), but it also offers an opportunity to diagnose a new primary melanoma at the first possible opportunity. A second invasive melanoma occurs in 5-10% of melanoma patients and a new melanoma in situ is diagnosed in more than 20% of melanoma patients.    Our practice makes the following recommendations for follow-up for patients with invasive melanoma.  At-least \"monthly\" self-skin examinations   Routine skin checks by a board certified dermatologist  Follow-up intervals are \"every 3 months\" within 2 years of a new melanoma diagnosis; \"every 6 months\" between 2-4 years of a new melanoma diagnosis; and \"annually\" after 4 years of a new melanoma diagnosis  Individual patient's needs should be considered before an appropriate follow-up is offered  Provide education and support to help the patient adjust to their illness    Follow-up appointments should include:  A check of the scar where the primary melanoma was removed  Checking the regional lymph nodes  A general skin examination  A full physical examination at least annually by your primary care physician    In those with more advanced primary disease, follow-up may include:  Blood tests  Imaging: ultrasound, X-ray, CT, MRI and PET scan.    Most tests are not worthwhile for patients with stage 1 or 2 melanoma unless there are signs or symptoms of disease recurrence or metastasis. No tests are necessary for healthy patients who have remained well for five years or longer after " removal of their melanoma.    What is the outlook for patients with melanoma?  Melanoma in situ is cured by excision because it has no potential to spread around the body.  The risk of spread and ultimate death from invasive melanoma depends on several factors, but the main one is the Breslow thickness of the melanoma at the time it was surgically removed.  Metastases are rare for melanomas < 0.75 mm and the risk for tumours 0.75-1 mm thick is about 5%. The risk steadily increases with thickness so that melanomas > 4 mm have a risk of metastasis of about 40%.    Melanoma is a potentially serious type of skin cancer, in which there is uncontrolled growth of melanocytes (pigment cells). Melanoma is sometimes called malignant melanoma.  Normal melanocytes are found in the basal layer of the epidermis (the outer layer of skin). Melanocytes produce a protein called melanin, which protects skin cells by absorbing ultraviolet (UV) radiation. Melanocytes are found in equal numbers in black and white skin, but melanocytes in black skin produce much more melanin. People with dark brown or black skin are very much less likely to be damaged by UV radiation than those with white skin.     HISTORY OF BASAL CELL CARCINOMA    Assessment and Plan:  Based on a thorough discussion of this condition and the management approach to it (including a comprehensive discussion of the known risks, side effects and potential benefits of treatment), the patient (family) agrees to implement the following specific plan:  Monitor changes  Sun protective clothing    How can basal cell carcinoma be prevented?  The most important way to prevent BCC is to avoid sunburn. This is especially important in childhood and early life. Fair skinned individuals and those with a personal or family history of BCC should protect their skin from sun exposure daily, year-round and lifelong.  Stay indoors or under the shade in the middle of the day   Wear covering  clothing   Apply high protection factor SPF50+ broad-spectrum sunscreens generously to exposed skin if outdoors   Avoid indoor tanning (sun beds, solaria)  Oral nicotinamide (vitamin B3) in a dose of 500 mg twice daily may reduce the number and severity of BCCs.    What is the outlook for basal cell carcinoma?  Most BCCs are cured by treatment. Cure is most likely if treatment is undertaken when the lesion is small.  About 50% of people with BCC develop a second one within 3 years of the first. They are also at increased risk of other skin cancers, especially melanoma. Regular self-skin examinations and long-term annual skin checks by an experienced health professional are recommended.     SEBORRHEIC KERATOSES  - Relevant exam: Scattered over the trunk/extremities are waxy brown to black plaques and papules with stuck on appearance and consistent dermoscopy  - Exam and clinical history consistent with seborrheic keratoses  - Counseled that these are benign growths that do not require treatment    MELANOCYTIC NEVI  -Relevant exam: Scattered over the trunk/extremities are homogenously pigmented brown macules and papules. ELM performed and without concerning findings. No outliers unless otherwise noted in today's note  - Exam and clinical history consistent with melanocytic nevi  - Counseled to return to clinic prior to scheduled appointment should any of these lesions change or should any new lesions of concern arise  - Counseled on use of sun protection daily. Reviewed latest FDA sunscreen guidelines, including use of broad spectrum (UVA and UVB blocking) sunscreen or sun protective clothing with SPF 30-50 every 2-3 hours and reapplied after exposure to water    LENTIGINES  OTHER SKIN CHANGES DUE TO CHRONIC EXPOSURE TO NONIONIZING RADIATION  - Relevant exam: Over sun exposed areas are brown macules. ELM performed and without concerning findings.  - Exam and clinical history consistent with lentigines.  - Counseled to  "return to clinic prior to scheduled appointment should any of these lesions change or should any new lesions of concern arise.  - Recommended use of sunscreen as above and below.    CHERRY ANGIOMAS  - Relevant exam: Scattered over the trunk/extremities are red papules  - Exam and clinical history consistent with cherry angiomas  - Educated that these are benign    SEBORRHEIC DERMATITIS    Assessment and Plan:  Based on a thorough discussion of this condition and the management approach to it (including a comprehensive discussion of the known risks, side effects and potential benefits of treatment), the patient (family) agrees to implement the following specific plan:  Apply clobetasol BID for up to 2 weeks to affected skin on scalp prn flares then take one week break and can repeat regimen prn flares. Do not apply to groin, skin folds, face.       Seborrheic Dermatitis   Seborrheic dermatitis is a common, chronic or relapsing form of eczema/dermatitis that mainly affects the sebaceous, gland-rich regions of the scalp, face, and trunk.  There are infantile and adult forms of seborrhoeic dermatitis. It is sometimes associated with psoriasis and, in that clinical scenario, may be referred to as \"sebo-psoriasis.\"  Seborrheic dermatitis is also known as \"seborrheic eczema.\"  Dandruff (also called \"pityriasis capitis\") is an uninflamed form of seborrhoeic dermatitis. Dandruff presents as bran-like scaly patches scattered within hair-bearing areas of the scalp.  In an infant, this condition may be referred to as \"cradle cap.\"  The cause of seborrheic dermatitis is not completely understood. It is associated with proliferation of various species of the skin commensal Malassezia, in its yeast (non-pathogenic) form. Its metabolites (such as the fatty acids oleic acid, malssezin, and indole-3-carbaldehyde) may cause an inflammatory reaction. Differences in skin barrier lipid content and function may account for individual " "presentations.    Infantile Seborrheic Dermatitis  Infantile seborrheic dermatitis affects babies under the age of 3 months and usually resolves by 6-12 months of age.  Infantile seborrheic dermatitis causes \"cradle cap\" (diffuse, greasy scaling on scalp). The rash may spread to affect armpit and groin folds (a type of \"napkin dermatitis\").  There may be associated salmon-pink colored patches that may flake or peel.  The rash in this case is usually not especially itchy, so the baby often appears undisturbed by the rash, even when more generalized.    Adult Seborrheic Dermatitis  Adult seborrheic dermatitis tends to begin in late adolescence; prevalence is greatest in young adults and in the elderly. It is more common in males than in females.    The following factors are sometimes associated with severe adult seborrheic dermatitis:  Oily skin  Familial tendency to seborrhoeic dermatitis or a family history of psoriasis  Immunosuppression: organ transplant recipient, human immunodeficiency virus (HIV) infection and patients with lymphoma  Neurological and psychiatric diseases: Parkinson disease, tardive dyskinesia, depression, epilepsy, facial nerve palsy, spinal cord injury and congenital disorders such as Down syndrome  Treatment for psoriasis with psoralen and ultraviolet A (PUVA) therapy  Lack of sleep  Stressful events.    In adults, seborrheic dermatitis may typically affect the scalp, face (creases around the nose, behind ears, within eyebrows) and upper trunk. Typical clinical features include:  Winter flares, improving in summer following sun exposure  Minimal itch most of the time  Combination oily and dry mid-facial skin  Ill-defined localized scaly patches or diffuse scale in the scalp  Blepharitis; scaly red eyelid margins  [place]-pink, thin, scaly, and ill-defined plaques in skin folds on both sides of the face  Petal or ring-shaped flaky patches on hair-line and on anterior chest  Rash in armpits, " under the breasts, in the groin folds and genital creases  Superficial folliculitis (inflamed hair follicles) on cheeks and upper trunk    Seborrheic dermatitis is diagnosed by its clinical appearance and behavior. Skin biopsy may be helpful but is rarely necessary to make this diagnosis.

## 2024-05-06 ENCOUNTER — TELEPHONE (OUTPATIENT)
Age: 64
End: 2024-05-06

## 2024-05-06 DIAGNOSIS — G25.81 RESTLESS LEG SYNDROME: ICD-10-CM

## 2024-05-06 RX ORDER — PRAMIPEXOLE DIHYDROCHLORIDE 0.25 MG/1
TABLET ORAL
Qty: 135 TABLET | Refills: 3 | Status: SHIPPED | OUTPATIENT
Start: 2024-05-06

## 2024-05-06 NOTE — TELEPHONE ENCOUNTER
Pt called as she said her pharmacy did not get the script for the shampoo. I did advise the patient that there is a note in her chart sent over to her through [facility] stating why they did not write the script and advised of their recommendations.

## 2024-05-15 PROBLEM — Z01.419 ENCOUNTER FOR ANNUAL ROUTINE GYNECOLOGICAL EXAMINATION: Status: ACTIVE | Noted: 2024-05-15

## 2024-05-15 NOTE — PROGRESS NOTES
Assessment/Plan:  NGE  CoTesting q 5 yrs.  '25  New onset heart murmur -she sees her PCP next month and will inform Dr. Palmer.  Vulvar HSV I   8/19 - no recurrences              RTO 1 yr  SBA monthly  3 D Mammo  Colonoscopy q 3 yrs- 5/17, 6/22 - Polyps - '25  Ca 1,000 mg/d with Vit D - does  Exercise 3/wk - tennis and yoga, walking  old records- reviewed 5/2/17- normal BMD '16- repeat 65     Depression Screen: Positive, being mamanged       Diagnoses and all orders for this visit:    Encounter for annual routine gynecological examination    Screening mammogram, encounter for  -     Mammo screening bilateral w 3d & cad; Future              Subjective:        Patient ID: Charlotte Portillo is a 63 y.o. female.    Charlotte presents today for an annual visit.  She has no complaints.  She found out recently that her paternal uncle has 3 daughters with breast cancer.  They did go for genetic testing but she is unaware of the results.  She will attempt to acquire them.  She will inform radiology so an updated Tyrer-Cuzick risk score can be generated.  We discussed annual MRIs if the risk is more than 20%.    She had COVID this past October.  She is up-to-date with mammography.  She had a normal mammogram in December.  She performs self breast exams.  She continues to feel fatigued since having COVID.  She had a relapse in alcoholism and spent a month in inpatient rehabilitation this past February.  She is currently doing very well.  She does not feel depressed.  She has not had a recurrence of the genital herpes.    She will be receiving an injection in her right hip tomorrow.        The following portions of the patient's history were reviewed and updated as appropriate: She  has a past medical history of Anxiety, Arthritis, Basal cell carcinoma (04/06/2022), Cancer (HCC), Depression, Melanoma (HCC) (04/2018), PONV (postoperative nausea and vomiting), and Primary localized osteoarthritis of left hip (10/31/2018).  Patient Active  Problem List    Diagnosis Date Noted    Encounter for annual routine gynecological examination 05/15/2024    Cancer (HCC) 2022    Screening mammogram, encounter for 2022    Restless legs syndrome (RLS) 2022    Status post total replacement of left hip 2021    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 2019    Lateral epicondylitis of right elbow 2019    Anxiety 2017    Depression 2017    Hyperlipidemia 2014   PMH:  Menarche 13  G3, P3;  FVD 42 wks '89,  SAVD  '; C/S c TL for Breech '  R Shoulder Arthroscopy   Bilateral Carpal Tunnel Repair  Anxiety /Depression '  Menopause  '10-   Recovered Alcoholic '  R Sciatica '15  Hypercholesterolemia '16  Skin Fungus- early 40's  Melanoma- L upper arm 3/17   Vulvar HSV I         Labral Tear          Brief relapse in alcoholism '       L Hip Replacement        Dog Bite - R Calf 3/22       Covid 10/23  She  has a past surgical history that includes  section; [place] tooth extraction; Shoulder surgery;  section (); [place] injection left hip (arthrogram) (10/24/2018); FL injection left hip (non arthrogram) (2018); FL injection left hip (non arthrogram) (2019); FL injection left hip (non arthrogram) (2019); FL injection left hip (non arthrogram) (2019); FL injection left hip (non arthrogram) (2020); FL injection left hip (non arthrogram) (2020); FL injection left hip (non arthrogram) (2021); Skin biopsy (2022); Rotator cuff repair; Knee arthroscopy; pr arthrp acetblr/prox fem prostc agrft/algrft (Left, 2021); and Colonoscopy (2022).  Her family history includes Alzheimer's disease in her maternal grandmother; Arthritis in her father and mother; Birth defects in her maternal aunt and paternal aunt; Breast cancer in her paternal cousin, paternal cousin, and paternal cousin; Breast cancer (age of onset: 70) in her maternal  aunt; Diabetes in her sister; Heart attack in her father, maternal aunt, and mother; Heart disease in her father; Hyperlipidemia in her mother; Hypertension in her mother; Lung cancer (age of onset: 60) in her paternal aunt; No Known Problems in her brother, brother, paternal grandfather, paternal grandmother, sister, son, son, and son; Ovarian cancer (age of onset: 50) in her maternal aunt; Scoliosis in her mother; Stroke in her maternal grandfather.  FH:  MA- Ovarian Ca 60  MA- Breast Ca 70  PA- Lung Ca 70  M-  Arthritis, HTN, MI d 82 10/18  F- HTN, 3rd MI 82 4/19  3PC's -sisters with breast cancer  She  reports that she quit smoking about 39 years ago. Her smoking use included cigarettes. She has never used smokeless tobacco. She reports current alcohol use. She reports that she does not use drugs.  SH:   '87,  '15 , he was an alcoholic too.  for [facility]. Sons are doing well- Dragan Garcia Joe. She had a brief relapse in alcoholism 1/21.  Her sons live in [place], [place] and the [facility].  Visited family in [place] 4/21.  Dragan had a daughter, Brett.  Current Outpatient Medications   Medication Sig Dispense Refill    amoxicillin (AMOXIL) 500 mg capsule       CALCIUM PO Take 600 mg by mouth in the morning      citalopram (CeleXA) 40 mg tablet Take 1 tablet (40 mg total) by mouth daily 90 tablet 1    Multiple Vitamin (MULTI-VITAMIN DAILY PO)       Multiple Vitamins-Minerals (Hair Skin and Nails Formula) TABS Take by mouth      pramipexole (MIRAPEX) 0.25 mg tablet 1 1/2 tabs daily 135 tablet 3    traZODone (DESYREL) 50 mg tablet       ciclopirox (LOPROX) 0.77 % cream Apply topically 2 (two) times a day Apply to bottom of right foot and in between right toes for 3 weeks 90 g 1    clobetasol (TEMOVATE) 0.05 % external solution Apply BID for up to 2 weeks to affected skin on scalp prn flares then take one week break and can repeat regimen prn flares. Do not  apply to groin, skin folds, face. (Patient not taking: Reported on 5/16/2024) 50 mL 4    naltrexone (REVIA) 50 mg tablet  (Patient not taking: Reported on 4/30/2024)       No current facility-administered medications for this visit.     Current Outpatient Medications on File Prior to Visit   Medication Sig    amoxicillin (AMOXIL) 500 mg capsule     CALCIUM PO Take 600 mg by mouth in the morning    citalopram (CeleXA) 40 mg tablet Take 1 tablet (40 mg total) by mouth daily    Multiple Vitamin (MULTI-VITAMIN DAILY PO)     Multiple Vitamins-Minerals (Hair Skin and Nails Formula) TABS Take by mouth    pramipexole (MIRAPEX) 0.25 mg tablet 1 1/2 tabs daily    traZODone (DESYREL) 50 mg tablet     ciclopirox (LOPROX) 0.77 % cream Apply topically 2 (two) times a day Apply to bottom of right foot and in between right toes for 3 weeks    clobetasol (TEMOVATE) 0.05 % external solution Apply BID for up to 2 weeks to affected skin on scalp prn flares then take one week break and can repeat regimen prn flares. Do not apply to groin, skin folds, face. (Patient not taking: Reported on 5/16/2024)    naltrexone (REVIA) 50 mg tablet  (Patient not taking: Reported on 4/30/2024)     No current facility-administered medications on file prior to visit.     She is allergic to monistat [tioconazole], benzoin, meloxicam, and nickel..    Review of Systems   Constitutional:  Positive for fatigue. Negative for activity change, appetite change and unexpected weight change.   Eyes:  Negative for visual disturbance.   Respiratory:  Negative for cough, chest tightness, shortness of breath and wheezing.    Cardiovascular:  Negative for chest pain, palpitations and leg swelling.        Breast: Patient denies tenderness, nipple discharge, masses, or erythema.   Gastrointestinal:  Negative for abdominal distention, abdominal pain, blood in stool, constipation, diarrhea, nausea and vomiting.   Endocrine: Negative for cold intolerance and heat  "intolerance.   Genitourinary:  Negative for decreased urine volume, difficulty urinating, dysuria, frequency, hematuria, menstrual problem, pelvic pain, urgency, vaginal bleeding, vaginal discharge and vaginal pain.        Rare stress incontinence.   Musculoskeletal:  Positive for arthralgias (Right hip.).   Skin:  Negative for rash.        Hair thinning   Neurological:  Negative for weakness, light-headedness, numbness and headaches.   Hematological:  Does not bruise/bleed easily.   Psychiatric/Behavioral:  Negative for agitation, behavioral problems and sleep disturbance. The patient is not nervous/anxious.          Objective:    Vitals:    05/16/24 0811   BP: 130/76   BP Location: Left arm   Patient Position: Sitting   Cuff Size: Standard   Weight: 84.8 kg (187 lb)   Height: 5' 3\" (1.6 m)            Physical Exam  Vitals and nursing note reviewed. Exam conducted with a chaperone present.   Constitutional:       General: She is not in acute distress.     Appearance: She is well-developed.   HENT:      Head: Normocephalic and atraumatic.   Eyes:      General: No scleral icterus.        Right eye: No discharge.         Left eye: No discharge.      Extraocular Movements: Extraocular movements intact.      Conjunctiva/sclera: Conjunctivae normal.   Neck:      Thyroid: No thyromegaly.      Trachea: No tracheal deviation.   Cardiovascular:      Rate and Rhythm: Normal rate and regular rhythm.      Heart sounds: Murmur (2/6) heard.   Pulmonary:      Effort: Pulmonary effort is normal. No respiratory distress.      Breath sounds: Normal breath sounds. No wheezing.   Chest:   Breasts:     Breasts are symmetrical.      Right: No inverted nipple, mass, nipple discharge, skin change or tenderness.      Left: No inverted nipple, mass, nipple discharge, skin change or tenderness.   Abdominal:      General: Bowel sounds are normal. There is no distension.      Palpations: Abdomen is soft. There is no mass.      Tenderness: There " is no abdominal tenderness. There is no guarding or rebound.   Genitourinary:     General: Normal vulva.      Labia:         Right: No rash, tenderness or lesion.         Left: No rash, tenderness or lesion.       Vagina: Normal.      Cervix: No cervical motion tenderness or discharge.      Uterus: Not deviated, not enlarged and not tender.       Adnexa:         Right: No mass, tenderness or fullness.          Left: No mass, tenderness or fullness.        Rectum: No external hemorrhoid.      Comments: Urethral meatus within normal limits.  Perineum within normal limits.  Bladder well supported.  Physiologic vaginal atrophy.  Small rectocele.  Musculoskeletal:         General: No tenderness. Normal range of motion.      Cervical back: Normal range of motion and neck supple.   Lymphadenopathy:      Cervical: No cervical adenopathy.   Skin:     General: Skin is warm and dry.   Neurological:      Mental Status: She is alert and oriented to person, place, and time.   Psychiatric:         Mood and Affect: Mood normal.         Behavior: Behavior normal.         Thought Content: Thought content normal.         Judgment: Judgment normal.

## 2024-05-16 ENCOUNTER — ANNUAL EXAM (OUTPATIENT)
Dept: OBGYN CLINIC | Facility: CLINIC | Age: 64
End: 2024-05-16
Payer: COMMERCIAL

## 2024-05-16 VITALS
DIASTOLIC BLOOD PRESSURE: 76 MMHG | WEIGHT: 187 LBS | SYSTOLIC BLOOD PRESSURE: 130 MMHG | HEIGHT: 63 IN | BODY MASS INDEX: 33.13 KG/M2

## 2024-05-16 DIAGNOSIS — Z12.31 SCREENING MAMMOGRAM, ENCOUNTER FOR: ICD-10-CM

## 2024-05-16 DIAGNOSIS — Z01.419 ENCOUNTER FOR ANNUAL ROUTINE GYNECOLOGICAL EXAMINATION: Primary | ICD-10-CM

## 2024-05-16 PROCEDURE — S0612 ANNUAL GYNECOLOGICAL EXAMINA: HCPCS | Performed by: OBSTETRICS & GYNECOLOGY

## 2024-05-17 ENCOUNTER — OFFICE VISIT (OUTPATIENT)
Dept: OBGYN CLINIC | Facility: OTHER | Age: 64
End: 2024-05-17
Payer: COMMERCIAL

## 2024-05-17 VITALS
WEIGHT: 186.95 LBS | BODY MASS INDEX: 33.12 KG/M2 | DIASTOLIC BLOOD PRESSURE: 76 MMHG | HEIGHT: 63 IN | SYSTOLIC BLOOD PRESSURE: 128 MMHG | HEART RATE: 72 BPM

## 2024-05-17 DIAGNOSIS — M16.11 PRIMARY OSTEOARTHRITIS OF ONE HIP, RIGHT: Primary | ICD-10-CM

## 2024-05-17 DIAGNOSIS — M25.551 PAIN IN RIGHT HIP: ICD-10-CM

## 2024-05-17 PROCEDURE — 20611 DRAIN/INJ JOINT/BURSA W/US: CPT | Performed by: ORTHOPAEDIC SURGERY

## 2024-05-17 RX ORDER — ROPIVACAINE HYDROCHLORIDE 5 MG/ML
16 INJECTION, SOLUTION EPIDURAL; INFILTRATION; PERINEURAL
Status: COMPLETED | OUTPATIENT
Start: 2024-05-17 | End: 2024-05-17

## 2024-05-17 RX ORDER — TRIAMCINOLONE ACETONIDE 40 MG/ML
80 INJECTION, SUSPENSION INTRA-ARTICULAR; INTRAMUSCULAR
Status: COMPLETED | OUTPATIENT
Start: 2024-05-17 | End: 2024-05-17

## 2024-05-17 RX ADMIN — ROPIVACAINE HYDROCHLORIDE 16 ML: 5 INJECTION, SOLUTION EPIDURAL; INFILTRATION; PERINEURAL at 11:30

## 2024-05-17 RX ADMIN — TRIAMCINOLONE ACETONIDE 80 MG: 40 INJECTION, SUSPENSION INTRA-ARTICULAR; INTRAMUSCULAR at 11:30

## 2024-05-17 NOTE — PATIENT INSTRUCTIONS
Patient was advised to keep the injection area clean and dry for the next 24 hours.  Patient was also advised to avoid getting into a bathtub, swimming pool were bodies of water for 2-3 days.  Patient may resume regular activities as tolerated and use local ice application or over-the-counter pain medications if there is any discomfort.  Advised to call us back immediately or go to the emergency room if there is any significant pain, swelling, redness, warmth or symptoms like fever or chills.

## 2024-05-17 NOTE — PROGRESS NOTES
Large joint arthrocentesis: R hip joint  Universal Protocol:  Consent: Verbal consent obtained.  Risks and benefits: risks, benefits and alternatives were discussed  Consent given by: patient  Patient understanding: patient states understanding of the procedure being performed  Site marked: the operative site was marked  Radiology Images displayed and confirmed. If images not available, report reviewed: imaging studies available  Required items: required blood products, implants, devices, and special equipment available  Patient identity confirmed: verbally with patient  Supporting Documentation  Indications: pain   Procedure Details  Location: hip - R hip joint  Preparation: Patient was prepped and draped in the usual sterile fashion  Needle size: 18 G (6 inch spinal needle)  Ultrasound guidance: yes (Curvilinear ultrasound transducer used with sterile ultrasound gel using a sterile technique and inline approach with the needle)  Approach: Anterior sagittal oblique.  Medications administered: 80 mg triamcinolone acetonide 40 mg/mL; 16 mL ropivacaine 0.5 %    Patient tolerance: patient tolerated the procedure well with no immediate complications  Dressing:  Sterile dressing applied

## 2024-06-04 ENCOUNTER — OFFICE VISIT (OUTPATIENT)
Dept: FAMILY MEDICINE CLINIC | Facility: CLINIC | Age: 64
End: 2024-06-04
Payer: COMMERCIAL

## 2024-06-04 VITALS
SYSTOLIC BLOOD PRESSURE: 140 MMHG | OXYGEN SATURATION: 99 % | DIASTOLIC BLOOD PRESSURE: 70 MMHG | BODY MASS INDEX: 32.96 KG/M2 | WEIGHT: 186 LBS | HEIGHT: 63 IN | HEART RATE: 63 BPM | RESPIRATION RATE: 16 BRPM

## 2024-06-04 DIAGNOSIS — Z00.00 ANNUAL PHYSICAL EXAM: Primary | ICD-10-CM

## 2024-06-04 DIAGNOSIS — R01.1 SYSTOLIC MURMUR: ICD-10-CM

## 2024-06-04 DIAGNOSIS — Z13.6 SCREENING FOR CARDIOVASCULAR CONDITION: ICD-10-CM

## 2024-06-04 PROCEDURE — 99396 PREV VISIT EST AGE 40-64: CPT | Performed by: FAMILY MEDICINE

## 2024-06-04 NOTE — PROGRESS NOTES
Adult Annual Physical  Name: Charlotte Portillo      : 1960      MRN: [id number]  Encounter Provider: Gregg Maharaj MD  Encounter Date: 2024   Encounter department: [facility]    Assessment & Plan   1. Annual physical exam  Comments:  Here for annual physical. New murmur appreciated on exam and was also noted by gyn at a recent visit. Asx. No LE edema, lung findings, or JVP. Will get echo. FBW ordered. Mammogram already ordered by her gynecologist   Orders:  -     Comprehensive metabolic panel; Future  -     Lipid panel; Future  -     Hemoglobin A1C; Future  2. Systolic murmur  Comments:  3/6 systolic murmur that is unchanged with valsalva, new finding. Pt is asymptomatic. Will get an ECHO  Orders:  -     Echo complete w/ contrast if indicated; Future; Expected date: 2024  3. Screening for cardiovascular condition  Comments:  FBW ordered  Orders:  -     Comprehensive metabolic panel; Future  -     Lipid panel; Future    Immunizations and preventive care screenings were discussed with patient today. Appropriate education was printed on patient's after visit summary.    Counseling:  Dental Health: discussed importance of regular tooth brushing, flossing, and dental visits.  Exercise: the importance of regular exercise/physical activity was discussed. Recommend exercise 3-5 times per week for at least 30 minutes.       Depression Screening and Follow-up Plan: Patient was screened for depression during today's encounter. They screened negative with a PHQ-9 score of 0.        History of Present Illness     Adult Annual Physical:  Patient presents for annual physical. Saw her gynecologist who recently discovered a murmur. Pt is not aware of having an murmur. Denies SOB, leg edema, dizziness, palpitations, or chest pain. No echo in the past. Otherwise doing well. Mammogram already ordered.     Diet and Physical Activity:  - Diet/Nutrition: well balanced diet.  - Exercise: walking. yoga,  rides bike    Depression Screening:    - PHQ-9 Score: 0    General Health:  - Sleep:. 6-9 hours  - Vision: wears glasses. was seen a[facility] he fall at [facility] but is going to establish with optometrist  - Dental: regular dental visits.    /GYN Health:  - Follows with GYN: no.   - Menopause: postmenopausal.   - History of STDs: yes  - Contraception:. herpes      Review of Systems  Past Medical History   Past Medical History:   Diagnosis Date    Anxiety     Arthritis     Basal cell carcinoma 2022    Right shoulder, excision     Cancer (HCC)     Depression     Melanoma (HCC) 2018    Left upper arm    PONV (postoperative nausea and vomiting)     Primary localized osteoarthritis of left hip 10/31/2018     Past Surgical History:   Procedure Laterality Date     SECTION       SECTION      COLONOSCOPY  2022    3 years    FL INJECTION LEFT HIP (ARTHROGRAM)  10/24/2018    FL INJECTION LEFT HIP (NON ARTHROGRAM)  2018    FL INJECTION LEFT HIP (NON ARTHROGRAM)  2019    FL INJECTION LEFT HIP (NON ARTHROGRAM)  2019    FL INJECTION LEFT HIP (NON ARTHROGRAM)  2019    FL INJECTION LEFT HIP (NON ARTHROGRAM)  2020    FL INJECTION LEFT HIP (NON ARTHROGRAM)  2020    FL INJECTION LEFT HIP (NON ARTHROGRAM)  2021    KNEE ARTHROSCOPY      ID ARTHRP ACETBLR/PROX FEM PROSTC AGRFT/ALGRFT Left 2021    Procedure: ARTHROPLASTY HIP TOTAL ANTERIOR;  Surgeon: Trisha Gonzalez MD;  [facility];  Service: Orthopedics    ROTATOR CUFF REPAIR      SHOULDER SURGERY      SKIN BIOPSY  2022    WISDOM TOOTH EXTRACTION       Family History   Problem Relation Age of Onset    Arthritis Mother     Hypertension Mother     Scoliosis Mother     Hyperlipidemia Mother     Heart attack Mother     Arthritis Father     Heart disease Father         CARDIAC DISORDER    Heart attack Father     Diabetes Sister         borderline    No Known Problems Sister     No Known  Problems Brother     No Known Problems Brother     No Known Problems Son     No Known Problems Son     No Known Problems Son     Alzheimer's disease Maternal Grandmother     Stroke Maternal Grandfather     No Known Problems Paternal Grandmother     No Known Problems Paternal Grandfather     Birth defects Maternal Aunt     Ovarian cancer Maternal Aunt 50    Breast cancer Maternal Aunt 70    Heart attack Maternal Aunt     Birth defects Paternal Aunt     Lung cancer Paternal Aunt 60    Breast cancer Paternal Cousin     Breast cancer Paternal Cousin     Breast cancer Paternal Cousin      Current Outpatient Medications on File Prior to Visit   Medication Sig Dispense Refill    amoxicillin (AMOXIL) 500 mg capsule       CALCIUM PO Take 600 mg by mouth in the morning      citalopram (CeleXA) 40 mg tablet Take 1 tablet (40 mg total) by mouth daily 90 tablet 1    Multiple Vitamin (MULTI-VITAMIN DAILY PO)       Multiple Vitamins-Minerals (Hair Skin and Nails Formula) TABS Take by mouth      pramipexole (MIRAPEX) 0.25 mg tablet 1 1/2 tabs daily 135 tablet 3    traZODone (DESYREL) 50 mg tablet       clobetasol (TEMOVATE) 0.05 % external solution Apply BID for up to 2 weeks to affected skin on scalp prn flares then take one week break and can repeat regimen prn flares. Do not apply to groin, skin folds, face. (Patient not taking: Reported on 5/16/2024) 50 mL 4    naltrexone (REVIA) 50 mg tablet  (Patient not taking: Reported on 4/30/2024)       No current facility-administered medications on file prior to visit.     Allergies   Allergen Reactions    Monistat [Tioconazole] Other (See Comments)     Severe vaginal burning    Benzoin Rash    Meloxicam Rash    Nickel Itching and Rash      Current Outpatient Medications on File Prior to Visit   Medication Sig Dispense Refill    amoxicillin (AMOXIL) 500 mg capsule       CALCIUM PO Take 600 mg by mouth in the morning      citalopram (CeleXA) 40 mg tablet Take 1 tablet (40 mg total) by  "mouth daily 90 tablet 1    Multiple Vitamin (MULTI-VITAMIN DAILY PO)       Multiple Vitamins-Minerals (Hair Skin and Nails Formula) TABS Take by mouth      pramipexole (MIRAPEX) 0.25 mg tablet 1 1/2 tabs daily 135 tablet 3    traZODone (DESYREL) 50 mg tablet       clobetasol (TEMOVATE) 0.05 % external solution Apply BID for up to 2 weeks to affected skin on scalp prn flares then take one week break and can repeat regimen prn flares. Do not apply to groin, skin folds, face. (Patient not taking: Reported on 2024) 50 mL 4    naltrexone (REVIA) 50 mg tablet  (Patient not taking: Reported on 2024)       No current facility-administered medications on file prior to visit.      Social History     Tobacco Use    Smoking status: Former     Current packs/day: 0.00     Types: Cigarettes     Quit date:      Years since quittin.4    Smokeless tobacco: Never   Vaping Use    Vaping status: Never Used   Substance and Sexual Activity    Alcohol use: Yes     Comment: weekends    Drug use: No    Sexual activity: Not Currently     Birth control/protection: Post-menopausal, Abstinence     Comment:        Objective     /70 (BP Location: Left arm, Patient Position: Sitting, Cuff Size: Standard)   Pulse 63   Resp 16   Ht 5' 3\" (1.6 m)   Wt 84.4 kg (186 lb)   SpO2 99%   BMI 32.95 kg/m²     Physical Exam  Constitutional:       General: She is not in acute distress.     Appearance: Normal appearance. She is not ill-appearing or toxic-appearing.   HENT:      Head: Normocephalic and atraumatic.      Right Ear: Tympanic membrane normal.      Left Ear: Tympanic membrane normal.      Mouth/Throat:      Mouth: Mucous membranes are moist.   Eyes:      Extraocular Movements: Extraocular movements intact.   Cardiovascular:      Rate and Rhythm: Normal rate and regular rhythm.      Heart sounds: Murmur (systolic murmur) heard.   Pulmonary:      Effort: Pulmonary effort is normal. No respiratory distress.      " Breath sounds: Normal breath sounds. No stridor. No wheezing or rhonchi.   Abdominal:      General: There is no distension.      Palpations: Abdomen is soft. There is no mass.      Tenderness: There is no abdominal tenderness. There is no guarding or rebound.      Hernia: No hernia is present.   Musculoskeletal:      Right lower leg: No edema.      Left lower leg: No edema.   Neurological:      Mental Status: She is alert and oriented to person, place, and time.   Psychiatric:         Behavior: Behavior normal.

## 2024-06-14 PROBLEM — Z01.419 ENCOUNTER FOR ANNUAL ROUTINE GYNECOLOGICAL EXAMINATION: Status: RESOLVED | Noted: 2024-05-15 | Resolved: 2024-06-14

## 2024-07-11 ENCOUNTER — HOSPITAL ENCOUNTER (OUTPATIENT)
Dept: NON INVASIVE DIAGNOSTICS | Facility: CLINIC | Age: 64
Discharge: HOME | End: 2024-07-11
Payer: COMMERCIAL

## 2024-07-11 VITALS
DIASTOLIC BLOOD PRESSURE: 70 MMHG | WEIGHT: 186.07 LBS | SYSTOLIC BLOOD PRESSURE: 140 MMHG | HEART RATE: 67 BPM | BODY MASS INDEX: 32.97 KG/M2 | HEIGHT: 63 IN

## 2024-07-11 DIAGNOSIS — R01.1 SYSTOLIC MURMUR: ICD-10-CM

## 2024-07-11 LAB
AORTIC ROOT: 3.2 CM
AORTIC VALVE MEAN VELOCITY: 13.7 M/S
APICAL FOUR CHAMBER EJECTION FRACTION: 58 %
ASCENDING AORTA: 3.6 CM
AV AREA BY CONTINUOUS VTI: 1.8 CM2
AV AREA PEAK VELOCITY: 1.8 CM2
AV LVOT MEAN GRADIENT: 3 MMHG
AV LVOT PEAK GRADIENT: 5 MMHG
AV MEAN GRADIENT: 8 MMHG
AV PEAK GRADIENT: 15 MMHG
AV VALVE AREA: 1.81 CM2
AV VELOCITY RATIO: 0.58
BSA FOR ECHO PROCEDURE: 1.88 M2
DOP CALC AO PEAK VEL: 1.95 M/S
DOP CALC AO VTI: 46.13 CM
DOP CALC LVOT AREA: 3.14 CM2
DOP CALC LVOT CARDIAC INDEX: 2.68 L/MIN/M2
DOP CALC LVOT CARDIAC OUTPUT: 5.01 L/MIN
DOP CALC LVOT DIAMETER: 2 CM
DOP CALC LVOT PEAK VEL VTI: 26.61 CM
DOP CALC LVOT PEAK VEL: 1.13 M/S
DOP CALC LVOT STROKE INDEX: 43.9 ML/M2
DOP CALC LVOT STROKE VOLUME: 83.56 CM3
E WAVE DECELERATION TIME: 208 MS
E/A RATIO: 0.7
FRACTIONAL SHORTENING: 31 (ref 28–44)
INTERVENTRICULAR SEPTUM IN DIASTOLE (PARASTERNAL SHORT AXIS VIEW): 1.1 CM
INTERVENTRICULAR SEPTUM: 1.1 CM (ref 0.6–1.1)
LAAS-AP2: 21.3 CM2
LAAS-AP4: 22.9 CM2
LEFT ATRIUM SIZE: 3.9 CM
LEFT ATRIUM VOLUME (MOD BIPLANE): 67 ML
LEFT ATRIUM VOLUME INDEX (MOD BIPLANE): 35.8 ML/M2
LEFT INTERNAL DIMENSION IN SYSTOLE: 3.1 CM (ref 2.1–4)
LEFT VENTRICULAR INTERNAL DIMENSION IN DIASTOLE: 4.5 CM (ref 3.5–6)
LEFT VENTRICULAR POSTERIOR WALL IN END DIASTOLE: 1.1 CM
LEFT VENTRICULAR STROKE VOLUME: 54 ML
LVSV (TEICH): 54 ML
MV E'TISSUE VEL-SEP: 10 CM/S
MV PEAK A VEL: 1.05 M/S
MV PEAK E VEL: 74 CM/S
MV STENOSIS PRESSURE HALF TIME: 60 MS
MV VALVE AREA P 1/2 METHOD: 3.67
RIGHT ATRIAL 2D VOLUME: 62 ML
RIGHT ATRIUM AREA SYSTOLE A4C: 21.4 CM2
RIGHT VENTRICLE ID DIMENSION: 4.1 CM
SL CV LEFT ATRIUM LENGTH A2C: 5.7 CM
SL CV LV EF: 60
SL CV PED ECHO LEFT VENTRICLE DIASTOLIC VOLUME (MOD BIPLANE) 2D: 91 ML
SL CV PED ECHO LEFT VENTRICLE SYSTOLIC VOLUME (MOD BIPLANE) 2D: 37 ML
TR MAX PG: 18 MMHG
TR PEAK VELOCITY: 2.1 M/S
TRICUSPID ANNULAR PLANE SYSTOLIC EXCURSION: 2.7 CM
TRICUSPID VALVE PEAK REGURGITATION VELOCITY: 2.11 M/S

## 2024-07-11 PROCEDURE — 93306 TTE W/DOPPLER COMPLETE: CPT

## 2024-07-11 PROCEDURE — 93306 TTE W/DOPPLER COMPLETE: CPT | Performed by: INTERNAL MEDICINE

## 2024-07-13 ENCOUNTER — APPOINTMENT (OUTPATIENT)
Dept: LAB | Facility: CLINIC | Age: 64
End: 2024-07-13
Payer: COMMERCIAL

## 2024-07-13 DIAGNOSIS — Z00.00 ANNUAL PHYSICAL EXAM: ICD-10-CM

## 2024-07-13 DIAGNOSIS — Z13.6 SCREENING FOR CARDIOVASCULAR CONDITION: ICD-10-CM

## 2024-07-13 LAB
ALBUMIN SERPL BCG-MCNC: 4.2 G/DL (ref 3.5–5)
ALP SERPL-CCNC: 44 U/L (ref 34–104)
ALT SERPL W P-5'-P-CCNC: 14 U/L (ref 7–52)
ANION GAP SERPL CALCULATED.3IONS-SCNC: 7 MMOL/L (ref 4–13)
AST SERPL W P-5'-P-CCNC: 14 U/L (ref 13–39)
BILIRUB SERPL-MCNC: 0.55 MG/DL (ref 0.2–1)
BUN SERPL-MCNC: 19 MG/DL (ref 5–25)
CALCIUM SERPL-MCNC: 9 MG/DL (ref 8.4–10.2)
CHLORIDE SERPL-SCNC: 104 MMOL/L (ref 96–108)
CHOLEST SERPL-MCNC: 284 MG/DL
CO2 SERPL-SCNC: 30 MMOL/L (ref 21–32)
CREAT SERPL-MCNC: 0.66 MG/DL (ref 0.6–1.3)
EST. AVERAGE GLUCOSE BLD GHB EST-MCNC: 114 MG/DL
GFR SERPL CREATININE-BSD FRML MDRD: 94 ML/MIN/1.73SQ M
GLUCOSE P FAST SERPL-MCNC: 96 MG/DL (ref 65–99)
HBA1C MFR BLD: 5.6 %
HDLC SERPL-MCNC: 92 MG/DL
LDLC SERPL CALC-MCNC: 178 MG/DL (ref 0–100)
NONHDLC SERPL-MCNC: 192 MG/DL
POTASSIUM SERPL-SCNC: 4.1 MMOL/L (ref 3.5–5.3)
PROT SERPL-MCNC: 7 G/DL (ref 6.4–8.4)
SODIUM SERPL-SCNC: 141 MMOL/L (ref 135–147)
TRIGL SERPL-MCNC: 72 MG/DL

## 2024-07-13 PROCEDURE — 80061 LIPID PANEL: CPT

## 2024-07-13 PROCEDURE — 36415 COLL VENOUS BLD VENIPUNCTURE: CPT

## 2024-07-13 PROCEDURE — 83036 HEMOGLOBIN GLYCOSYLATED A1C: CPT

## 2024-07-13 PROCEDURE — 80053 COMPREHEN METABOLIC PANEL: CPT

## 2024-07-16 ENCOUNTER — OFFICE VISIT (OUTPATIENT)
Dept: URGENT CARE | Age: 64
End: 2024-07-16
Payer: COMMERCIAL

## 2024-07-16 ENCOUNTER — TELEPHONE (OUTPATIENT)
Age: 64
End: 2024-07-16

## 2024-07-16 VITALS
DIASTOLIC BLOOD PRESSURE: 70 MMHG | SYSTOLIC BLOOD PRESSURE: 120 MMHG | TEMPERATURE: 98.2 F | OXYGEN SATURATION: 97 % | HEART RATE: 87 BPM | RESPIRATION RATE: 20 BRPM

## 2024-07-16 DIAGNOSIS — S05.01XA ABRASION OF RIGHT CORNEA, INITIAL ENCOUNTER: Primary | ICD-10-CM

## 2024-07-16 PROCEDURE — 99214 OFFICE O/P EST MOD 30 MIN: CPT | Performed by: STUDENT IN AN ORGANIZED HEALTH CARE EDUCATION/TRAINING PROGRAM

## 2024-07-16 RX ORDER — ERYTHROMYCIN 5 MG/G
0.5 OINTMENT OPHTHALMIC EVERY 6 HOURS SCHEDULED
Qty: 24 G | Refills: 0 | Status: SHIPPED | OUTPATIENT
Start: 2024-07-16 | End: 2024-07-21

## 2024-07-16 NOTE — PROGRESS NOTES
[facility]        NAME: Charlotte Portillo is a 63 y.o. female  : 1960    MRN: [id number]  DATE: 2024  TIME: 3:20 PM    Assessment and Plan   Abrasion of right cornea, initial encounter [S05.01XA]  1. Abrasion of right cornea, initial encounter  erythromycin (ILOTYCIN) ophthalmic ointment            Patient Instructions   There is a shallow scratch on your eye.  I am prescribing antibiotic eye ointment.  You may use artificial tears drops between the antibiotic to help with comfort.  You may use cool compresses, Tylenol or Motrin to help with pain.  If your symptoms or not improving in the coming days, please follow with your PCP.    Follow up with PCP in 3-5 days.  Proceed to  ER if symptoms worsen.    If tests have been performed at [facility], our office will contact you with results if changes need to be made to the care plan discussed with you at the visit.  [facility]    Chief Complaint     Chief Complaint   Patient presents with    Eye Pain     Right eye swollen, red and watery eye  it started this morning. Corrective both eyes 20/30, left 20/40, right 20/30, color good.         History of Present Illness       Patient presents for evaluation of her right eye.  Started with redness, irritation, discomfort this morning as well as tearing.  She did not have any injury to the eye, did not feel anything get inside of it.  However, currently she has sensation as though there could be something in the eye.  She is not a contact lens wearer        Review of Systems   Review of Systems   All other systems reviewed and are negative.        Current Medications       Current Outpatient Medications:     CALCIUM PO, Take 600 mg by mouth in the morning, Disp: , Rfl:     citalopram (CeleXA) 40 mg tablet, Take 1 tablet (40 mg total) by mouth daily, Disp: 90 tablet, Rfl: 1    erythromycin (ILOTYCIN) ophthalmic ointment, Administer 0.5 inches to the right eye  every 6 (six) hours for 5 days, Disp: 24 g, Rfl: 0    Multiple Vitamin (MULTI-VITAMIN DAILY PO), , Disp: , Rfl:     Multiple Vitamins-Minerals (Hair Skin and Nails Formula) TABS, Take by mouth, Disp: , Rfl:     pramipexole (MIRAPEX) 0.25 mg tablet, 1 1/2 tabs daily, Disp: 135 tablet, Rfl: 3    amoxicillin (AMOXIL) 500 mg capsule, , Disp: , Rfl:     clobetasol (TEMOVATE) 0.05 % external solution, Apply BID for up to 2 weeks to affected skin on scalp prn flares then take one week break and can repeat regimen prn flares. Do not apply to groin, skin folds, face. (Patient not taking: Reported on 2024), Disp: 50 mL, Rfl: 4    naltrexone (REVIA) 50 mg tablet, , Disp: , Rfl:     traZODone (DESYREL) 50 mg tablet, , Disp: , Rfl:     Current Allergies     Allergies as of 2024 - Reviewed 2024   Allergen Reaction Noted    Monistat [tioconazole] Other (See Comments) 2018    Benzoin Rash 2015    Meloxicam Rash 2018    Nickel Itching and Rash 08/10/2021            The following portions of the patient's history were reviewed and updated as appropriate: allergies, current medications, past family history, past medical history, past social history, past surgical history and problem list.     Past Medical History:   Diagnosis Date    Anxiety     Arthritis     Basal cell carcinoma 2022    Right shoulder, excision     Cancer (HCC)     Depression     Melanoma (HCC) 2018    Left upper arm    PONV (postoperative nausea and vomiting)     Primary localized osteoarthritis of left hip 10/31/2018       Past Surgical History:   Procedure Laterality Date     SECTION       SECTION  1994    COLONOSCOPY  2022    3 years    FL INJECTION LEFT HIP (ARTHROGRAM)  10/24/2018    FL INJECTION LEFT HIP (NON ARTHROGRAM)  2018    FL INJECTION LEFT HIP (NON ARTHROGRAM)  2019    FL INJECTION LEFT HIP (NON ARTHROGRAM)  2019    FL INJECTION LEFT HIP (NON ARTHROGRAM)  2019     FL INJECTION LEFT HIP (NON ARTHROGRAM)  08/31/2020    FL INJECTION LEFT HIP (NON ARTHROGRAM)  12/07/2020    FL INJECTION LEFT HIP (NON ARTHROGRAM)  04/13/2021    KNEE ARTHROSCOPY      ND ARTHRP ACETBLR/PROX FEM PROSTC AGRFT/ALGRFT Left 12/13/2021    Procedure: ARTHROPLASTY HIP TOTAL ANTERIOR;  Surgeon: Trisha Gonzalez MD;  [facility];  Service: Orthopedics    ROTATOR CUFF REPAIR      SHOULDER SURGERY      SKIN BIOPSY  03/08/2022    WISDOM TOOTH EXTRACTION         Family History   Problem Relation Age of Onset    Arthritis Mother     Hypertension Mother     Scoliosis Mother     Hyperlipidemia Mother     Heart attack Mother     Arthritis Father     Heart disease Father         CARDIAC DISORDER    Heart attack Father     Diabetes Sister         borderline    No Known Problems Sister     No Known Problems Brother     No Known Problems Brother     No Known Problems Son     No Known Problems Son     No Known Problems Son     Alzheimer's disease Maternal Grandmother     Stroke Maternal Grandfather     No Known Problems Paternal Grandmother     No Known Problems Paternal Grandfather     Birth defects Maternal Aunt     Ovarian cancer Maternal Aunt 50    Breast cancer Maternal Aunt 70    Heart attack Maternal Aunt     Birth defects Paternal Aunt     Lung cancer Paternal Aunt 60    Breast cancer Paternal Cousin     Breast cancer Paternal Cousin     Breast cancer Paternal Cousin          Medications have been verified.        Objective   /70   Pulse 87   Temp 98.2 °F (36.8 °C) (Tympanic)   Resp 20   SpO2 97%   No LMP recorded. Patient is postmenopausal.       Physical Exam     Physical Exam  Vitals and nursing note reviewed.   Eyes:      General: Lids are everted, no foreign bodies appreciated.        Comments: Mild lid edema upper and lower R eye  Conjunctival injection, clear tearing  Shallow area of fluorescein uptake as above           Procedures  Right eye anesthetized with topical tetracaine applied  with drops.  Fluorescein stain applied, findings as above.  Patient tolerated well, no immediate complications.  Reported immediate relief from tetracaine drops.

## 2024-07-16 NOTE — PATIENT INSTRUCTIONS
There is a shallow scratch on your eye.  I am prescribing antibiotic eye ointment.  You may use artificial tears drops between the antibiotic to help with comfort.  You may use cool compresses, Tylenol or Motrin to help with pain.  If your symptoms or not improving in the coming days, please follow with your PCP.

## 2024-07-16 NOTE — TELEPHONE ENCOUNTER
She can go to urgent care or I can see her tomorrow- she is also to contact her eye doctor as well    Dr campbell

## 2024-07-16 NOTE — TELEPHONE ENCOUNTER
Patient called to schedule a sick appointment for the eye irritation that she is experiencing.  She stated it started this morning: c/o watery eye, some pain, feels like its scratched.  No available appointments today at practice, only same day and next day appointments the rest of the week.  Called clerical line and spoke with Marilu to see if we can work the patient into the schedule today. Ask to send message to clerical team and would route  message to Dr. Palmer.

## 2024-08-13 DIAGNOSIS — F33.0 MILD EPISODE OF RECURRENT MAJOR DEPRESSIVE DISORDER (HCC): ICD-10-CM

## 2024-08-13 NOTE — TELEPHONE ENCOUNTER
Reason for call:   [x] Refill   [] Prior Auth  [] Other:     Office:   [x] PCP/Provider - Norma Palmer  [] Specialty/Provider -     Medication: citalopram (CeleXA) 40 mg tablet     Dose/Frequency:     Take 1 tablet (40 mg total) by mouth daily       Quantity: 100    Pharmacy: [facility] - [place] - [address] [phone]            Does the patient have enough for 3 days?   [x] Yes   [] No - Send as HP to POD

## 2024-08-14 RX ORDER — CITALOPRAM HYDROBROMIDE 40 MG/1
40 TABLET ORAL DAILY
Qty: 100 TABLET | Refills: 0 | Status: SHIPPED | OUTPATIENT
Start: 2024-08-14

## 2024-08-14 NOTE — TELEPHONE ENCOUNTER
Refill must be reviewed and completed by the office or provider. The refill is unable to be approved or denied by the medication management team.      Request for 1000 day supply - Please review to see if the refill is appropriate.

## 2024-09-13 ENCOUNTER — TELEPHONE (OUTPATIENT)
Age: 64
End: 2024-09-13

## 2024-09-13 NOTE — TELEPHONE ENCOUNTER
Caller: Patient    Doctor: Dr. Arroyo    Reason for call: Patient calling to schedule an USGI of the right hip.  She can be reached at the number below.      Call back#: [phone]

## 2024-09-27 ENCOUNTER — PROCEDURE VISIT (OUTPATIENT)
Dept: OBGYN CLINIC | Facility: OTHER | Age: 64
End: 2024-09-27

## 2024-09-27 VITALS
DIASTOLIC BLOOD PRESSURE: 74 MMHG | HEART RATE: 69 BPM | WEIGHT: 189 LBS | SYSTOLIC BLOOD PRESSURE: 112 MMHG | HEIGHT: 63 IN | BODY MASS INDEX: 33.49 KG/M2

## 2024-09-27 DIAGNOSIS — M16.11 PRIMARY OSTEOARTHRITIS OF ONE HIP, RIGHT: Primary | ICD-10-CM

## 2024-09-27 DIAGNOSIS — M25.551 PAIN IN RIGHT HIP: ICD-10-CM

## 2024-09-27 RX ORDER — TRIAMCINOLONE ACETONIDE 40 MG/ML
80 INJECTION, SUSPENSION INTRA-ARTICULAR; INTRAMUSCULAR
Status: COMPLETED | OUTPATIENT
Start: 2024-09-27 | End: 2024-09-27

## 2024-09-27 RX ADMIN — TRIAMCINOLONE ACETONIDE 80 MG: 40 INJECTION, SUSPENSION INTRA-ARTICULAR; INTRAMUSCULAR at 08:30

## 2024-09-27 NOTE — PROGRESS NOTES
Chief Complaint: Right hip pain    HPI:    Charlotte Portillo is a 63 year old Female who presents today for follow up of right hip pain.  Patient was initially seen for this problem April 23, 2024.  Followed by a corticosteroid injection on May 17, 2024.  Patient continues to have pain with sitting and crossing her legs with anterior hip pain.  She states that she had 100% relief after previous corticosteroid injection from April to the end of August.  The pain began to bother her at the beginning of September but has not returned to its previous pain level prior to [place] corticosteroid injection.  Of note patient has had a hip replacement of her left hip.      I have personally reviewed pertinent films in PACS.  Xr hip pelvis Rt 04/30/2024: Moderate right hip osteoarthritis.      Patient Active Problem List   Diagnosis    Anxiety    Depression    Hyperlipidemia    Lateral epicondylitis of right elbow    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    Status post total replacement of left hip    Restless legs syndrome (RLS)    Screening mammogram, encounter for    Cancer (HCC)        Current Outpatient Medications on File Prior to Visit   Medication Sig Dispense Refill    amoxicillin (AMOXIL) 500 mg capsule       CALCIUM PO Take 600 mg by mouth in the morning      citalopram (CeleXA) 40 mg tablet Take 1 tablet (40 mg total) by mouth daily 100 tablet 0    Multiple Vitamin (MULTI-VITAMIN DAILY PO)       Multiple Vitamins-Minerals (Hair Skin and Nails Formula) TABS Take by mouth      pramipexole (MIRAPEX) 0.25 mg tablet 1 1/2 tabs daily 135 tablet 3    clobetasol (TEMOVATE) 0.05 % external solution Apply BID for up to 2 weeks to affected skin on scalp prn flares then take one week break and can repeat regimen prn flares. Do not apply to groin, skin folds, face. (Patient not taking: Reported on 5/16/2024) 50 mL 4    naltrexone (REVIA) 50 mg tablet  (Patient not taking: Reported on 4/30/2024)      traZODone (DESYREL)  50 mg tablet  (Patient not taking: Reported on 9/27/2024)       No current facility-administered medications on file prior to visit.        Allergies   Allergen Reactions    Monistat [Tioconazole] Other (See Comments)     Severe vaginal burning    Benzoin Rash    Meloxicam Rash    Nickel Itching and Rash        Tobacco Use: Medium Risk (9/27/2024)    Patient History     Smoking Tobacco Use: Former     Smokeless Tobacco Use: Never     Passive Exposure: Not on file        Social Determinants of Health     Tobacco Use: Medium Risk (9/27/2024)    Patient History     Smoking Tobacco Use: Former     Smokeless Tobacco Use: Never     Passive Exposure: Not on file   Alcohol Use: Not At Risk (5/12/2022)    AUDIT-C     Frequency of Alcohol Consumption: 2-3 times a week     Average Number of Drinks: 1 or 2     Frequency of Binge Drinking: Never   Financial Resource Strain: Not on file   Food Insecurity: Not on file   Transportation Needs: Not on file   Physical Activity: Not on file   Stress: Not on file   Social Connections: Not on file   Intimate Partner Violence: Not on file   Depression: Not at risk (1/28/2021)    PHQ-2     PHQ-2 Score: 2   Housing Stability: Not on file   Utilities: Not on file   Health Literacy: Not on file          Review of Systems     Body mass index is 33.48 kg/m².     Physical Exam  Vitals and nursing note reviewed.   Constitutional:       General: She is not in acute distress.     Appearance: She is well-developed.   HENT:      Head: Normocephalic and atraumatic.   Eyes:      Conjunctiva/sclera: Conjunctivae normal.   Cardiovascular:      Rate and Rhythm: Normal rate and regular rhythm.   Pulmonary:      Breath sounds: No rhonchi.   Skin:     General: Skin is warm and dry.      Capillary Refill: Capillary refill takes less than 2 seconds.   Neurological:      General: No focal deficit present.      Mental Status: She is alert.   Psychiatric:         Mood and Affect: Mood normal.          Ortho  Exam:     Body part: right hip     Inspection: No visible deformity     Palpation: Mild discomfort over the greater trochanter.  Also has tenderness of the anterior joint line.     Range of motion: Hip flexion is 90 degrees, external rotation is 50 degrees and internal rotation is 5 degrees     Special Tests: Positive IDALMIS.  Negative logroll.     Distal Neurovascular Status: Intact, Yes    Large joint arthrocentesis: R hip joint  Universal Protocol:  Procedure performed by: ((Dr. Michela Arroyo))  Consent: Verbal consent obtained.  Risks and benefits: risks, benefits and alternatives were discussed  Consent given by: patient  Patient understanding: patient states understanding of the procedure being performed  Required items: required blood products, implants, devices, and special equipment available  Patient identity confirmed: verbally with patient  Supporting Documentation  Indications: pain   Procedure Details  Location: hip - R hip joint  Preparation: Patient was prepped and draped in the usual sterile fashion  Needle size: 18 G (6 inch spinal needle)  Ultrasound guidance: yes (Curvilinear ultrasound transducer used with sterile ultrasound gel using a sterile technique and inline approach with the needle)  Approach: Anterior sagittal oblique.  Medications administered: 80 mg triamcinolone acetonide 40 mg/mL (16 mL ropivacaine 0.5%)    Patient tolerance: patient tolerated the procedure well with no immediate complications  Dressing:  Sterile dressing applied                 Assessment:     Diagnosis ICD-10-CM Associated Orders   1. Primary osteoarthritis of one hip, right  M16.11       2. Pain in right hip  M25.551            Plan:    Discussed with patient that her left hip prior to having it been replaced received multiple corticosteroid injections that did not last more than 3 to 4 months each.  Advised the patient to consider that her right hip also only lasted 3 to 4 months after his most recent  "corticosteroid injection.  Patient was advised that it may be possible that she may require ultimately a right hip replacement as well.  Patient at this time would like to continue with injections to see if they tend to last longer going forward.  Patient received a corticosteroid injection today to her right hip.     Follow-Up:    Patient may follow-up as needed or corticosteroid injection.      Portions of the record may have been created with voice recognition software. Occasional wrong word or \"sound alike\" substitutions may have occurred due to the inherent limitations of voice recognition software. Please review the chart carefully and recognize, using context, where substitutions/typographical errors may have occurred.          "

## 2024-10-07 NOTE — TELEPHONE ENCOUNTER
Reason for call:   [x] Refill   [] Prior Auth  [x] Other: Patient states she had a hip replacement, and has a dental appointment 10/14/2024, script needed for antibiotic     Office:   [] PCP/Provider -   [x] Specialty/Provider - Ortho - Historical Provider, MD     Medication:  amoxicillin (AMOXIL) 500 mg capsule    Dose/Frequency: not listed    Quantity: not listed    Pharmacy: [facility] - [place] - [address] [phone]    Does the patient have enough for 3 days?   [] Yes   [x] No - Send as HP to POD

## 2024-10-11 RX ORDER — AMOXICILLIN 500 MG/1
CAPSULE ORAL
Refills: 0 | OUTPATIENT
Start: 2024-10-11

## 2024-10-11 NOTE — TELEPHONE ENCOUNTER
Pt called refill line checking on status as her dental appt is on Monday and she needs the Amoxicillin called in ASAP. Sending HP message to office/provider.

## 2024-10-14 DIAGNOSIS — Z96.642 STATUS POST TOTAL REPLACEMENT OF LEFT HIP: Primary | ICD-10-CM

## 2024-10-14 RX ORDER — AMOXICILLIN 500 MG/1
CAPSULE ORAL
Qty: 4 CAPSULE | Refills: 2 | Status: SHIPPED | OUTPATIENT
Start: 2024-10-14 | End: 2025-10-08

## 2024-10-14 NOTE — TELEPHONE ENCOUNTER
This should have gone to covering provider. I was away. Not sure patient still needs this. I sent it this am     Dr campbell

## 2024-10-29 ENCOUNTER — OFFICE VISIT (OUTPATIENT)
Dept: DERMATOLOGY | Facility: CLINIC | Age: 64
End: 2024-10-29
Payer: COMMERCIAL

## 2024-10-29 VITALS — BODY MASS INDEX: 33.48 KG/M2 | TEMPERATURE: 98.7 F | WEIGHT: 189 LBS

## 2024-10-29 DIAGNOSIS — D48.9 NEOPLASM OF UNCERTAIN BEHAVIOR: ICD-10-CM

## 2024-10-29 DIAGNOSIS — D22.9 MULTIPLE BENIGN MELANOCYTIC NEVI: ICD-10-CM

## 2024-10-29 DIAGNOSIS — L81.4 LENTIGINES: ICD-10-CM

## 2024-10-29 DIAGNOSIS — D23.9 DERMATOFIBROMA: ICD-10-CM

## 2024-10-29 DIAGNOSIS — Z85.828 HISTORY OF BASAL CELL CANCER: ICD-10-CM

## 2024-10-29 DIAGNOSIS — D18.01 CHERRY ANGIOMA: ICD-10-CM

## 2024-10-29 DIAGNOSIS — L21.9 SEBORRHEIC DERMATITIS: ICD-10-CM

## 2024-10-29 DIAGNOSIS — Z12.83 SKIN EXAM, SCREENING FOR CANCER: Primary | ICD-10-CM

## 2024-10-29 DIAGNOSIS — Z86.006 HISTORY OF MELANOMA IN SITU: ICD-10-CM

## 2024-10-29 DIAGNOSIS — L82.1 SEBORRHEIC KERATOSIS: ICD-10-CM

## 2024-10-29 PROCEDURE — 99214 OFFICE O/P EST MOD 30 MIN: CPT

## 2024-10-29 RX ORDER — FLUOROURACIL 50 MG/G
CREAM TOPICAL
Qty: 40 G | Refills: 0 | Status: SHIPPED | OUTPATIENT
Start: 2024-10-29

## 2024-10-29 NOTE — PROGRESS NOTES
"[facility] Note     Patient Name: Charlotte Portillo  Encounter Date: 10/29/2024     Have you been cared for by a [facility] Dermatologist in the last 3 years and, if so, which description applies to you?    Yes.  I have been here within the last 3 years, and my medical history has NOT changed since that time.  I am FEMALE/of child-bearing potential.    REVIEW OF SYSTEMS:  Have you recently had or currently have any of the following? No changes in my recent health.   PAST MEDICAL HISTORY:  Have you personally ever had or currently have any of the following?  If \"YES,\" then please provide more detail. No changes in my medical history.   HISTORY OF IMMUNOSUPPRESSION: Do you have a history of any of the following:  Systemic Immunosuppression such as Diabetes, Biologic or Immunotherapy, Chemotherapy, Organ Transplantation, Bone Marrow Transplantation or Prednisone?  No     Answering \"YES\" requires the addition of the dotphrase \"IMMUNOSUPPRESSED\" as the first diagnosis of the patient's visit.   FAMILY HISTORY:  Any \"first degree relatives\" (parent, brother, sister, or child) with the following?    No changes in my family's known health.   PATIENT EXPERIENCE:    Do you want the Dermatologist to perform a COMPLETE skin exam today including a clinical examination under the \"bra and underwear\" areas?  Yes  If necessary, do we have your permission to call and leave a detailed message on your Preferred Phone number that includes your specific medical information?  Yes      Allergies   Allergen Reactions    Monistat [Tioconazole] Other (See Comments)     Severe vaginal burning    Benzoin Rash    Meloxicam Rash    Nickel Itching and Rash      Current Outpatient Medications:     amoxicillin (AMOXIL) 500 mg capsule, Take 4(four) 500 mg capsules by mouth 1 hour prior to dental procedure, Disp: 4 capsule, Rfl: 2    CALCIUM PO, Take 600 mg by mouth in the morning, Disp: , Rfl:     citalopram (CeleXA) 40 mg tablet, Take 1 " tablet (40 mg total) by mouth daily, Disp: 100 tablet, Rfl: 0    clobetasol (TEMOVATE) 0.05 % external solution, Apply BID for up to 2 weeks to affected skin on scalp prn flares then take one week break and can repeat regimen prn flares. Do not apply to groin, skin folds, face. (Patient not taking: Reported on 5/16/2024), Disp: 50 mL, Rfl: 4    Multiple Vitamin (MULTI-VITAMIN DAILY PO), , Disp: , Rfl:     Multiple Vitamins-Minerals (Hair Skin and Nails Formula) TABS, Take by mouth, Disp: , Rfl:     naltrexone (REVIA) 50 mg tablet, , Disp: , Rfl:     pramipexole (MIRAPEX) 0.25 mg tablet, 1 1/2 tabs daily, Disp: 135 tablet, Rfl: 3    traZODone (DESYREL) 50 mg tablet, , Disp: , Rfl:           Whom besides the patient is providing clinical information about today's encounter?   NO ADDITIONAL HISTORIAN (patient alone provided history)    Physical Exam and Assessment/Plan by Diagnosis:    HISTORY OF MELANOMA IN SITU    Physical Exam:  Anatomic Location Affected:  left upper arm   Morphological Description of Scar:  well healed   Year Treated: 04/2018  Suspected Recurrence: no  Regional adenopathy: no    Additional History of Present Condition:  History of melanoma in situ with no sign of recurrence . Patient UTD on GYN, dental, eye exams. The patient denies fevers, chills, night sweats, weight loss, headache, visual change, bone pain, paraesthesias, cough, SOB, abdominal pain, n/v/d, and is feeling at their baseline health.      Assessment and Plan:  Based on a thorough discussion of this condition and the management approach to it (including a comprehensive discussion of the known risks, side effects and potential benefits of treatment), the patient (family) agrees to implement the following specific plan:  Continue 6 month skin exams   Apply sunscreen SPF 30+, reapplying every two hours. Wear sun protective clothing, long sleeve shirts, sunglasses, and wide brimmed hats.     What happens at follow-up?  The main purpose  "of follow-up is to detect recurrences early (metastatic melanoma), but it also offers an opportunity to diagnose a new primary melanoma at the first possible opportunity. A second invasive melanoma occurs in 5-10% of melanoma patients and a new melanoma in situ is diagnosed in more than 20% of melanoma patients.    Our practice makes the following recommendations for follow-up for patients with invasive melanoma.  At-least \"monthly\" self-skin examinations   Routine skin checks by a board certified dermatologist  Follow-up intervals are \"every 3 months\" within 2 years of a new melanoma diagnosis; \"every 6 months\" between 2-4 years of a new melanoma diagnosis; and \"annually\" after 4 years of a new melanoma diagnosis  Individual patient's needs should be considered before an appropriate follow-up is offered  Provide education and support to help the patient adjust to their illness    Follow-up appointments should include:  A check of the scar where the primary melanoma was removed  Checking the regional lymph nodes  A general skin examination  A full physical examination at least annually by your primary care physician    In those with more advanced primary disease, follow-up may include:  Blood tests  Imaging: ultrasound, X-ray, CT, MRI and PET scan.    Most tests are not worthwhile for patients with stage 1 or 2 melanoma unless there are signs or symptoms of disease recurrence or metastasis. No tests are necessary for healthy patients who have remained well for five years or longer after removal of their melanoma.    What is the outlook for patients with melanoma?  Melanoma in situ is cured by excision because it has no potential to spread around the body.  The risk of spread and ultimate death from invasive melanoma depends on several factors, but the main one is the Breslow thickness of the melanoma at the time it was surgically removed.  Metastases are rare for melanomas < 0.75 mm and the risk for tumours 0.75-1 " mm thick is about 5%. The risk steadily increases with thickness so that melanomas > 4 mm have a risk of metastasis of about 40%.    Melanoma is a potentially serious type of skin cancer, in which there is uncontrolled growth of melanocytes (pigment cells). Melanoma is sometimes called malignant melanoma.  Normal melanocytes are found in the basal layer of the epidermis (the outer layer of skin). Melanocytes produce a protein called melanin, which protects skin cells by absorbing ultraviolet (UV) radiation. Melanocytes are found in equal numbers in black and white skin, but melanocytes in black skin produce much more melanin. People with dark brown or black skin are very much less likely to be damaged by UV radiation than those with white skin.     HISTORY OF BASAL CELL CARCINOMA    Physical Exam:  Anatomic Location Affected:  right posterior shoulder  Morphological Description of scar:  well healed   Suspected Recurrence: No  Pertinent Positives:  Pertinent Negatives:      Additional History of Present Condition:  History of basal cell carcinoma with no sign of recurrence. Treated via excision on 4/6/2022 by Dr. Crain.     Assessment and Plan:  Based on a thorough discussion of this condition and the management approach to it (including a comprehensive discussion of the known risks, side effects and potential benefits of treatment), the patient (family) agrees to implement the following specific plan:  Continue 6 month skin exams   Apply sunscreen SPF 30+, reapplying every two hours. Wear sun protective clothing, long sleeve shirts, sunglasses, and wide brimmed hats.     How can basal cell carcinoma be prevented?  The most important way to prevent BCC is to avoid sunburn. This is especially important in childhood and early life. Fair skinned individuals and those with a personal or family history of BCC should protect their skin from sun exposure daily, year-round and lifelong.  Stay indoors or under the shade in  the middle of the day   Wear covering clothing   Apply high protection factor SPF50+ broad-spectrum sunscreens generously to exposed skin if outdoors   Avoid indoor tanning (sun beds, solaria)  Oral nicotinamide (vitamin B3) in a dose of 500 mg twice daily may reduce the number and severity of BCCs.    What is the outlook for basal cell carcinoma?  Most BCCs are cured by treatment. Cure is most likely if treatment is undertaken when the lesion is small.  About 50% of people with BCC develop a second one within 3 years of the first. They are also at increased risk of other skin cancers, especially melanoma. Regular self-skin examinations and long-term annual skin checks by an experienced health professional are recommended.     SEBORRHEIC KERATOSES  - Relevant exam: Scattered over the trunk/extremities, face are waxy brown to black plaques and papules with stuck on appearance and consistent dermoscopy  - Exam and clinical history consistent with seborrheic keratoses  - Counseled that these are benign growths that do not require treatment    MELANOCYTIC NEVI  -Relevant exam: Scattered over the trunk/extremities are homogenously pigmented brown macules and papules. ELM performed and without concerning findings. No outliers unless otherwise noted in today's note  - Exam and clinical history consistent with melanocytic nevi  - Counseled to return to clinic prior to scheduled appointment should any of these lesions change or should any new lesions of concern arise  - Counseled on use of sun protection daily. Reviewed latest FDA sunscreen guidelines, including use of broad spectrum (UVA and UVB blocking) sunscreen or sun protective clothing with SPF 30-50 every 2-3 hours and reapplied after exposure to water    LENTIGINES  OTHER SKIN CHANGES DUE TO CHRONIC EXPOSURE TO NONIONIZING RADIATION  - Relevant exam: Over sun exposed areas are brown macules. ELM performed and without concerning findings.  - Exam and clinical history  "consistent with lentigines.  - Counseled to return to clinic prior to scheduled appointment should any of these lesions change or should any new lesions of concern arise.  - Recommended use of sunscreen as above and below.    CHERRY ANGIOMAS  - Relevant exam: Scattered over the trunk/extremities are red papules  - Exam and clinical history consistent with cherry angiomas  - Educated that these are benign    SEBORRHEIC DERMATITIS    Physical Exam:  Anatomic Location Affected:  scalp   Morphological Description:  clear today  Pertinent Positives:  Pertinent Negatives:    Additional History of Present Condition:  Patient states she has been using Selsun Blue to wash hair, has noticed improvement. Reports she washes hair everyday, using the Selsun Blue about 5 times a week.     Assessment and Plan:  Based on a thorough discussion of this condition and the management approach to it (including a comprehensive discussion of the known risks, side effects and potential benefits of treatment), the patient (family) agrees to implement the following specific plan:  Continue current regimen with Selsun Blue shampoo     Seborrheic Dermatitis   Seborrheic dermatitis is a common, chronic or relapsing form of eczema/dermatitis that mainly affects the sebaceous, gland-rich regions of the scalp, face, and trunk.  There are infantile and adult forms of seborrhoeic dermatitis. It is sometimes associated with psoriasis and, in that clinical scenario, may be referred to as \"sebo-psoriasis.\"  Seborrheic dermatitis is also known as \"seborrheic eczema.\"  Dandruff (also called \"pityriasis capitis\") is an uninflamed form of seborrhoeic dermatitis. Dandruff presents as bran-like scaly patches scattered within hair-bearing areas of the scalp.  In an infant, this condition may be referred to as \"cradle cap.\"  The cause of seborrheic dermatitis is not completely understood. It is associated with proliferation of various species of the skin " "commensal Malassezia, in its yeast (non-pathogenic) form. Its metabolites (such as the fatty acids oleic acid, malssezin, and indole-3-carbaldehyde) may cause an inflammatory reaction. Differences in skin barrier lipid content and function may account for individual presentations.    Infantile Seborrheic Dermatitis  Infantile seborrheic dermatitis affects babies under the age of 3 months and usually resolves by 6-12 months of age.  Infantile seborrheic dermatitis causes \"cradle cap\" (diffuse, greasy scaling on scalp). The rash may spread to affect armpit and groin folds (a type of \"napkin dermatitis\").  There may be associated salmon-pink colored patches that may flake or peel.  The rash in this case is usually not especially itchy, so the baby often appears undisturbed by the rash, even when more generalized.    Adult Seborrheic Dermatitis  Adult seborrheic dermatitis tends to begin in late adolescence; prevalence is greatest in young adults and in the elderly. It is more common in males than in females.    The following factors are sometimes associated with severe adult seborrheic dermatitis:  Oily skin  Familial tendency to seborrhoeic dermatitis or a family history of psoriasis  Immunosuppression: organ transplant recipient, human immunodeficiency virus (HIV) infection and patients with lymphoma  Neurological and psychiatric diseases: Parkinson disease, tardive dyskinesia, depression, epilepsy, facial nerve palsy, spinal cord injury and congenital disorders such as Down syndrome  Treatment for psoriasis with psoralen and ultraviolet A (PUVA) therapy  Lack of sleep  Stressful events.    In adults, seborrheic dermatitis may typically affect the scalp, face (creases around the nose, behind ears, within eyebrows) and upper trunk. Typical clinical features include:  Winter flares, improving in summer following sun exposure  Minimal itch most of the time  Combination oily and dry mid-facial skin  Ill-defined localized " "scaly patches or diffuse scale in the scalp  Blepharitis; scaly red eyelid margins  [place]-pink, thin, scaly, and ill-defined plaques in skin folds on both sides of the face  Petal or ring-shaped flaky patches on hair-line and on anterior chest  Rash in armpits, under the breasts, in the groin folds and genital creases  Superficial folliculitis (inflamed hair follicles) on cheeks and upper trunk    Seborrheic dermatitis is diagnosed by its clinical appearance and behavior. Skin biopsy may be helpful but is rarely necessary to make this diagnosis.     DERMATOFIBROMA    Physical Exam:  Anatomic Location Affected:  posterior right upper arm   Morphological Description:  brown papule with positive lateral dimple sign  Pertinent Positives:  Pertinent Negatives:    Additional History of Present Condition:  Noted during exam. Patient states it has been there for years.     Assessment and Plan:  Based on a thorough discussion of this condition and the management approach to it (including a comprehensive discussion of the known risks, side effects and potential benefits of treatment), the patient (family) agrees to implement the following specific plan:  Reassured benign.     Assessment and Plan:  A dermatofibroma is a common benign fibrous nodule that most often arises on the skin of the lower legs.  A dermatofibroma is also called a \"cutaneous fibrous histiocytoma.\"  Dermatofibromas occur at all ages and in people of every ethnicity. They are more common in women than in men.    It is not clear if dermatofibroma is a reactive process or if it is a neoplasm. The lesions are made up of proliferating fibroblasts. Histiocytes may also be involved.  They are sometimes attributed to an insect bite or ingrownhair or local trauma, but not consistently. They may be more numerous in patients with altered immunity.    Dermatofibromas most often occur on the legs and arms, but may also arise on the trunk or any site of the body.  " "Typical clinical features include the following:  People may have 1 or up to 15 lesions.  Size varies from 0.5-1.5 cm diameter; most lesions are 7-10 mm diameter.  They are firm nodules tethered to the skin surface and mobile over subcutaneous tissue.  The skin \"dimples\" on pinching the lesion.  Color may be pink to light brown in white skin, and dark brown to black in dark skin; some appear paler in the center.  They do not usually cause symptoms, but they are sometimes painful or itchy.  Because they are often raised lesions, they may be traumatized, for example by a razor.  Occasionally dozens may erupt within a few months, usually in the setting of immunosuppression (for example autoimmune disease, cancer or certain medications).  Dermatofibroma does not give rise to cancer. However, occasionally, it may be mistaken for dermatofibrosarcoma or desmoplastic melanoma.    A dermatofibroma is harmless and seldom causes any symptoms. Usually, only reassurance is needed. If it is nuisance or causing concern, the lesion can be removed surgically, resulting in a scar that is, by definition, usually longer in diameter than the widest portion of the dermatofibroma.  Cryotherapy, shave biopsy and laser surgery are rarely completely successful.  Skin punch biopsy or incisional biopsy may be undertaken if there is an atypical feature such as recent enlargement, ulceration, or asymmetrical structures and colours on dermatoscopy.     TINEA PEDIS (\"ATHLETE'S FOOT\")    Physical Exam:  Anatomic Location Affected:  bilateral feet, toenails   Morphological Description:  scaling across distal aspect of feet and toes with some thickening of toenails (unable to visualize nail itself due to nail polish)  Pertinent Positives:  Pertinent Negatives:    Additional History of Present Condition:  Patient states she has been dealing with it for years. During visit on 10/19/2022 discussed treatment options and decided not to use oral " "medication. Topicals discussed, but held off on using as well.     Assessment and Plan:  Based on a thorough discussion of this condition and the management approach to it (including a comprehensive discussion of the known risks, side effects and potential benefits of treatment), the patient (family) agrees to implement the following specific plan:  Discussed over the counter Lotrimin or Lamisil that patient can use for maintenance.   Discussed prescription terbinafine cream if insurance covers medication and patient is not satisfied with over the counter topical results OR consideration for fungal scraping and oral antifungal     Tinea Pedis  Tinea pedis is a fungal infection of the foot and is in fact the most common fungal infection. Tinea pedis is caused by dermatophyte fungi with the three most common being Trichophyton (T.) rubrum, T. interdigitale and Epidermophyton floccosum.    Tinea pedis most commonly involves the interdigital spaces, known as \"athlete's foot.\" Other typical sites include the toenails, groin, and palms of the hands.  There are four major manifestations of tinea pedis including chronic hyperkeratotic, chronic intertriginous, acute ulcerative and vesicobullous. Signs and symptoms include:   Itchiness, redness, and scaling between the toes  Scales covering the soles and sides of the feet  Blisters over the inner aspect of the feet    It is particularly common in hot, tropical, and urban environments where sweating in the feet facilitate fungal growth. Risk factors for development include:  Occlusive footwear  Excessive swearing  Diabetes or other underlying immunosuppression   Poor peripheral circulation     The diagnosis of tinea pedis can usually be made via good history and physical exam due to its characteristic clinical features. Diagnosis can be confirmed by examining skin scrapings under the microscope. Cultures are occasionally done but may not be necessary if fungi are seen under " "microscopy.     Other diagnoses to consider if patients do not respond to therapy include psoriasis, contact dermatitis, and eczema.    Tinea pedis can be treated with topical antifungal drugs applied to affected areas on a repeated basis (usually 2 twice a day) for 2 to 4 weeks. Common topical medications include topical ketoconazole, allylamines, butenafine, ciclopirox, and tolnaftate.  In cases that do not respond to topical therapy, oral antifungal agents may be used which include terbinafine, itraconazole, fluconazole and griseofulvin. These oral agents are also used to treat tinea capitis (fungal infection of the scalp) and onychomycosis (fungal infection of the nails).  Those with pre-existing liver problems are usually screened for liver function prior to starting oral terbinafine.     Tinea pedis can be prevented by making sure feet are clean and dry with protective footwear worn in communal facilities. Other recommendations are:  Using drying foot powders when wearing occlusive shoes   Thoroughly dry shoes and boots prior to wearing them   Making sure to clean contaminated bathroom floors with bleach   Treatment of family members and other close contacts    NEOPLASM OF UNCERTAIN BEHAVIOR OF SKIN    Physical Exam:  (Anatomic Location); (Size and Morphological Description); (Differential Diagnosis):  Right tip of nose; 0.1 cm x 0.1 cm blanchable erythematous macule with ectatic vessels; ddx: basal cell carcinoma         Additional History of Present Condition:   Patient reports of red spot on tip of nose that occasionally bleeds. Reports of two instances of bleeding since last appointment in April, 2024. Denies any soreness. Treated with imiquimod back in 2022 when lesion first appeared    Assessment and Plan:  I have discussed with the patient that a sample of skin via a \"skin biopsy” would be potentially helpful to further make a specific diagnosis under the microscope.  Based on a thorough discussion of " this condition and the management approach to it (including a comprehensive discussion of the known risks, side effects and potential benefits of treatment), the patient (family) agrees to implement the following specific plan:    Apply fluorouracil 5% cream twice daily for 6 weeks.  Follow up one month after treatment with cream is complete.   If spot does not resolve with topical, discussed small punch biopsy.      Scribe Attestation      I,:  Ambreen Moses MA am acting as a scribe while in the presence of the attending physician.:       I,:  Luisa Jacinto PA-C personally performed the services described in this documentation    as scribed in my presence.:

## 2024-11-12 DIAGNOSIS — F33.0 MILD EPISODE OF RECURRENT MAJOR DEPRESSIVE DISORDER (HCC): ICD-10-CM

## 2024-11-12 NOTE — TELEPHONE ENCOUNTER
Reason for call:   [x] Refill   [] Prior Auth  [] Other:     Office:   [x] PCP/Provider - Norma Palmer    [] Specialty/Provider -     Medication:     citalopram (CeleXA) 40 mg tablet       Dose/Frequency:        Take 1 tablet (40 mg total) by mouth daily                      Quantity: 100    Pharmacy: [facility]) - [place] - [address] [phone]     Does the patient have enough for 3 days?   [x] Yes   [] No - Send as HP to POD

## 2024-11-13 RX ORDER — CITALOPRAM HYDROBROMIDE 40 MG/1
40 TABLET ORAL DAILY
Qty: 100 TABLET | Refills: 1 | Status: SHIPPED | OUTPATIENT
Start: 2024-11-13

## 2025-01-16 ENCOUNTER — TELEPHONE (OUTPATIENT)
Dept: DERMATOLOGY | Facility: CLINIC | Age: 65
End: 2025-01-16

## 2025-01-16 NOTE — TELEPHONE ENCOUNTER
LMOM that 10/07/25 appt w/Dr Powell was cx & r/s, due to a change in her schedule, please give the office a call to confirm or r/s

## 2025-01-20 ENCOUNTER — TELEPHONE (OUTPATIENT)
Dept: DERMATOLOGY | Facility: CLINIC | Age: 65
End: 2025-01-20

## 2025-01-20 NOTE — TELEPHONE ENCOUNTER
Called patient and left VM to reschedule appointment on 04/29/25 with Luisa due to provider being out of office that day. Rescheduled patient with Yoli on 5/01/25 in [facility] at 8:20. Advised patient to call the office if this rescheduled appointment does not work for them.

## 2025-01-28 ENCOUNTER — OFFICE VISIT (OUTPATIENT)
Dept: URGENT CARE | Age: 65
End: 2025-01-28
Payer: COMMERCIAL

## 2025-01-28 VITALS
RESPIRATION RATE: 18 BRPM | HEART RATE: 65 BPM | HEIGHT: 63 IN | OXYGEN SATURATION: 99 % | TEMPERATURE: 99.3 F | BODY MASS INDEX: 33.66 KG/M2 | WEIGHT: 190 LBS

## 2025-01-28 DIAGNOSIS — H10.32 ACUTE CONJUNCTIVITIS OF LEFT EYE, UNSPECIFIED ACUTE CONJUNCTIVITIS TYPE: Primary | ICD-10-CM

## 2025-01-28 PROCEDURE — 99213 OFFICE O/P EST LOW 20 MIN: CPT

## 2025-01-28 RX ORDER — OFLOXACIN 3 MG/ML
1 SOLUTION/ DROPS OPHTHALMIC 4 TIMES DAILY
Qty: 5 ML | Refills: 0 | Status: SHIPPED | OUTPATIENT
Start: 2025-01-28 | End: 2025-02-04

## 2025-01-28 NOTE — PATIENT INSTRUCTIONS
Please begin antibiotic drops as directed.   Avoid use of contact lenses until treatment is complete and symptoms have resolved.   Follow up with Ophthalmologist as discussed.

## 2025-01-28 NOTE — PROGRESS NOTES
Assessment/Plan  Please begin antibiotic drops as directed.   Avoid use of contact lenses until treatment is complete and symptoms have resolved.   Follow up with Ophthalmologist as discussed.     Acute conjunctivitis of left eye, unspecified acute conjunctivitis type [H10.32]  1. Acute conjunctivitis of left eye, unspecified acute conjunctivitis type  ofloxacin (OCUFLOX) 0.3 % ophthalmic solution    Ambulatory Referral to Ophthalmology      Patient Education     Conjunctivitis (Pink Eye) ED   General Information   You came to the Emergency Department (ED) for pink eye. The medical name for this is conjunctivitis. Your eye is irritated or infected. Sometimes an allergy is bothering your eye. This means something in the air has come into contact with your eye and it is red and irritated. Your eye may also itch, burn, or be sensitive to light. If an infection is causing your pink eye, it is easy to spread from person to person.  Infections are often caused by viruses and antibiotics won’t help. Most of the time, pink eye will go away on its own without treatment after a few days.  If the doctor thinks you have a bacterial infection in your eye, you will need antibiotics to treat the infection. It is important to take all of your antibiotics even if your eye starts to feel better.  What care is needed at home?   Call your regular doctor to let them know you were in the ED. Make a follow-up appointment if you were told to.  Gently remove your eye drainage or crusting with a clean cloth and warm water.  Avoid pollen if an allergy is causing your pink eye. Stay inside as much as you can with the windows closed during peak allergy seasons.  Lubricating eye drops or allergy eye drops may help your eye feel better. Make sure to read the directions carefully. Wash your hands with care before and after you touch your eye.  When you use eye drops, take care not to touch the bottle or dropper to your eye.  If you wear contact  lenses, you will need to stop wearing them for a short time until your symptoms go away. If your contacts are disposable, start with fresh ones after your eye gets better. If not, clean your contacts with care. Clean your contact case thoroughly or get a new one.  Avoid sharing towels, washcloths, bedding, or personal items when you have pink eye.  You may need to stay out of work or school for a few days.  When do I need to call the doctor?   You have trouble seeing clearly after blinking.  Your eye is still red or has drainage after 3 days.  You have eye pain that is getting worse.  You have new or worsening symptoms.  Last Reviewed Date   2020-12-16  Consumer Information Use and Disclaimer   This generalized information is a limited summary of diagnosis, treatment, and/or medication information. It is not meant to be comprehensive and should be used as a tool to help the user understand and/or assess potential diagnostic and treatment options. It does NOT include all information about conditions, treatments, medications, side effects, or risks that may apply to a specific patient. It is not intended to be medical advice or a substitute for the medical advice, diagnosis, or treatment of a health care provider based on the health care provider's examination and assessment of a patient’s specific and unique circumstances. Patients must speak with a health care provider for complete information about their health, medical questions, and treatment options, including any risks or benefits regarding use of medications. This information does not endorse any treatments or medications as safe, effective, or approved for treating a specific patient. [employer] and its affiliates disclaim any warranty or liability relating to this information or the use thereof. The use of this information is governed by the Terms of Use, available at https://www.woltersShareSquareuwer.com/en/know/clinical-effectiveness-terms   Copyright    Copyright ©  [employer] and its affiliates and/or licensors. All rights reserved.      Subjective:     Patient ID: Charlotte Portillo is a 64 y.o. female.      Reason For Visit / Chief Complaint  Chief Complaint   Patient presents with    Eye Problem     Red left eye x several days... no drng or tearing and no itch         Patient wears contact lenses. Denies eye pain, vision changes.     Conjunctivitis   The current episode started 3 to 5 days ago. The onset was gradual. The problem occurs continuously. The problem has been gradually worsening. The problem is mild. Nothing relieves the symptoms. Associated symptoms include eye redness. Pertinent negatives include no orthopnea, no fever, no decreased vision, no double vision, no eye itching, no photophobia, no diarrhea, no nausea, no vomiting, no congestion, no ear discharge, no ear pain, no headaches, no hearing loss, no mouth sores, no rhinorrhea, no sore throat, no stridor, no swollen glands, no neck pain, no cough, no URI, no wheezing, no rash, no eye discharge and no eye pain. The left eye is affected.         Past Medical History:   Diagnosis Date    Anxiety     Arthritis     Basal cell carcinoma 2022    Right shoulder, excision     Cancer (HCC)     Depression     Melanoma (HCC) 2018    Left upper arm    PONV (postoperative nausea and vomiting)     Primary localized osteoarthritis of left hip 10/31/2018       Past Surgical History:   Procedure Laterality Date     SECTION       SECTION  1994    COLONOSCOPY  2022    3 years    FL INJECTION LEFT HIP (ARTHROGRAM)  10/24/2018    FL INJECTION LEFT HIP (NON ARTHROGRAM)  2018    FL INJECTION LEFT HIP (NON ARTHROGRAM)  2019    FL INJECTION LEFT HIP (NON ARTHROGRAM)  2019    FL INJECTION LEFT HIP (NON ARTHROGRAM)  2019    FL INJECTION LEFT HIP (NON ARTHROGRAM)  2020    FL INJECTION LEFT HIP (NON ARTHROGRAM)  2020    FL INJECTION LEFT HIP (NON  ARTHROGRAM)  04/13/2021    KNEE ARTHROSCOPY      CA ARTHRP ACETBLR/PROX FEM PROSTC AGRFT/ALGRFT Left 12/13/2021    Procedure: ARTHROPLASTY HIP TOTAL ANTERIOR;  Surgeon: Trisha Gonzalez MD;  Location: [facility];  Service: Orthopedics    ROTATOR CUFF REPAIR      SHOULDER SURGERY      SKIN BIOPSY  03/08/2022    WISDOM TOOTH EXTRACTION         Family History   Problem Relation Age of Onset    Arthritis Mother     Hypertension Mother     Scoliosis Mother     Hyperlipidemia Mother     Heart attack Mother     Arthritis Father     Heart disease Father         CARDIAC DISORDER    Heart attack Father     Diabetes Sister         borderline    No Known Problems Sister     No Known Problems Brother     No Known Problems Brother     No Known Problems Son     No Known Problems Son     No Known Problems Son     Alzheimer's disease Maternal Grandmother     Stroke Maternal Grandfather     No Known Problems Paternal Grandmother     No Known Problems Paternal Grandfather     Birth defects Maternal Aunt     Ovarian cancer Maternal Aunt 50    Breast cancer Maternal Aunt 70    Heart attack Maternal Aunt     Birth defects Paternal Aunt     Lung cancer Paternal Aunt 60    Breast cancer Paternal Cousin     Breast cancer Paternal Cousin     Breast cancer Paternal Cousin        Review of Systems   Constitutional:  Negative for fatigue and fever.   HENT:  Negative for congestion, ear discharge, ear pain, hearing loss, mouth sores, postnasal drip, rhinorrhea, sinus pressure, sinus pain, sneezing and sore throat.    Eyes:  Positive for redness. Negative for double vision, photophobia, pain, discharge, itching and visual disturbance.   Respiratory: Negative.  Negative for apnea, cough, choking, chest tightness, shortness of breath, wheezing and stridor.    Cardiovascular: Negative.  Negative for chest pain, palpitations and orthopnea.   Gastrointestinal: Negative.  Negative for diarrhea, nausea and vomiting.   Endocrine: Negative.  Negative  "for polydipsia, polyphagia and polyuria.   Genitourinary: Negative.  Negative for decreased urine volume and flank pain.   Musculoskeletal: Negative.  Negative for arthralgias, back pain, myalgias, neck pain and neck stiffness.   Skin: Negative.  Negative for color change and rash.   Allergic/Immunologic: Negative.  Negative for environmental allergies.   Neurological: Negative.  Negative for dizziness, facial asymmetry, light-headedness, numbness and headaches.   Hematological: Negative.  Negative for adenopathy.   Psychiatric/Behavioral: Negative.         Objective:    Pulse 65   Temp 99.3 °F (37.4 °C)   Resp 18   Ht 5' 3\" (1.6 m)   Wt 86.2 kg (190 lb)   SpO2 99%   BMI 33.66 kg/m²     Physical Exam  Vitals and nursing note reviewed.   Constitutional:       General: She is not in acute distress.     Appearance: Normal appearance. She is not ill-appearing, toxic-appearing or diaphoretic.   HENT:      Head: Normocephalic and atraumatic.      Right Ear: Tympanic membrane, ear canal and external ear normal. There is no impacted cerumen.      Left Ear: Tympanic membrane, ear canal and external ear normal. There is no impacted cerumen.      Nose: Nose normal. No congestion or rhinorrhea.      Mouth/Throat:      Mouth: Mucous membranes are moist.   Eyes:      General: Lids are normal. Lids are everted, no foreign bodies appreciated. Vision grossly intact. Gaze aligned appropriately. No allergic shiner, visual field deficit or scleral icterus.        Right eye: No foreign body, discharge or hordeolum.         Left eye: No foreign body, discharge or hordeolum.      Extraocular Movements: Extraocular movements intact.      Left eye: Normal extraocular motion and no nystagmus.      Conjunctiva/sclera:      Left eye: Left conjunctiva is injected. No chemosis, exudate or hemorrhage.     Pupils: Pupils are equal, round, and reactive to light. Pupils are equal.      Left eye: Pupil is round, reactive and not sluggish. " Fluorescein uptake present. No corneal abrasion. Niall exam negative.       Comments: There is fluorescein uptake at the 5:00 position of the left iris, does not appear to be a laceration, possible pinguecula.    Cardiovascular:      Rate and Rhythm: Normal rate and regular rhythm.      Pulses: Normal pulses.      Heart sounds: Normal heart sounds. No murmur heard.     No friction rub. No gallop.   Pulmonary:      Effort: Pulmonary effort is normal. No respiratory distress.      Breath sounds: Normal breath sounds. No stridor. No wheezing, rhonchi or rales.   Abdominal:      General: Bowel sounds are normal.      Palpations: Abdomen is soft.      Tenderness: There is no abdominal tenderness. There is no guarding or rebound.   Musculoskeletal:         General: Normal range of motion.      Cervical back: Normal range of motion and neck supple. No rigidity or tenderness.   Lymphadenopathy:      Cervical: No cervical adenopathy.   Skin:     General: Skin is warm and dry.      Capillary Refill: Capillary refill takes less than 2 seconds.   Neurological:      General: No focal deficit present.      Mental Status: She is alert and oriented to person, place, and time.      Cranial Nerves: No cranial nerve deficit.   Psychiatric:         Mood and Affect: Mood normal.         Behavior: Behavior normal.

## 2025-02-04 ENCOUNTER — OFFICE VISIT (OUTPATIENT)
Dept: DERMATOLOGY | Facility: CLINIC | Age: 65
End: 2025-02-04
Payer: COMMERCIAL

## 2025-02-04 VITALS — TEMPERATURE: 98.9 F | BODY MASS INDEX: 34.37 KG/M2 | WEIGHT: 194 LBS

## 2025-02-04 DIAGNOSIS — D48.9 NEOPLASM OF UNCERTAIN BEHAVIOR: Primary | ICD-10-CM

## 2025-02-04 DIAGNOSIS — L57.0 KERATOSIS, ACTINIC: ICD-10-CM

## 2025-02-04 PROCEDURE — 17000 DESTRUCT PREMALG LESION: CPT

## 2025-02-04 PROCEDURE — 99213 OFFICE O/P EST LOW 20 MIN: CPT

## 2025-02-04 NOTE — PROGRESS NOTES
"[facility]at[facility] Clinic Note     Patient Name: Charlotte Portillo  Encounter Date: 02/04/25     Have you been cared for by a [facility] Dermatologist in the last 3 years and, if so, which description applies to you?    Yes.  I have been here within the last 3 years, and my medical history has NOT changed since that time.  I am FEMALE/of child-bearing potential.    REVIEW OF SYSTEMS:  Have you recently had or currently have any of the following? No changes in my recent health.   PAST MEDICAL HISTORY:  Have you personally ever had or currently have any of the following?  If \"YES,\" then please provide more detail. No changes in my medical history.   HISTORY OF IMMUNOSUPPRESSION: Do you have a history of any of the following:  Systemic Immunosuppression such as Diabetes, Biologic or Immunotherapy, Chemotherapy, Organ Transplantation, Bone Marrow Transplantation or Prednisone?  No     Answering \"YES\" requires the addition of the dotphrase \"IMMUNOSUPPRESSED\" as the first diagnosis of the patient's visit.   FAMILY HISTORY:  Any \"first degree relatives\" (parent, brother, sister, or child) with the following?    No changes in my family's known health.   PATIENT EXPERIENCE:    Do you want the Dermatologist to perform a COMPLETE skin exam today including a clinical examination under the \"bra and underwear\" areas?  NO  If necessary, do we have your permission to call and leave a detailed message on your Preferred Phone number that includes your specific medical information?  Yes      Allergies   Allergen Reactions   • Monistat [Tioconazole] Other (See Comments)     Severe vaginal burning   • Benzoin Rash   • Meloxicam Rash   • Nickel Itching and Rash      Current Outpatient Medications:   •  amoxicillin (AMOXIL) 500 mg capsule, Take 4(four) 500 mg capsules by mouth 1 hour prior to dental procedure, Disp: 4 capsule, Rfl: 2  •  CALCIUM PO, Take 600 mg by mouth in the morning, Disp: , Rfl:   •  citalopram (CeleXA) 40 mg tablet, " Take 1 tablet (40 mg total) by mouth daily, Disp: 100 tablet, Rfl: 1  •  clobetasol (TEMOVATE) 0.05 % external solution, Apply BID for up to 2 weeks to affected skin on scalp prn flares then take one week break and can repeat regimen prn flares. Do not apply to groin, skin folds, face., Disp: 50 mL, Rfl: 4  •  Multiple Vitamin (MULTI-VITAMIN DAILY PO), , Disp: , Rfl:   •  Multiple Vitamins-Minerals (Hair Skin and Nails Formula) TABS, Take by mouth, Disp: , Rfl:   •  naltrexone (REVIA) 50 mg tablet, , Disp: , Rfl:   •  ofloxacin (OCUFLOX) 0.3 % ophthalmic solution, Administer 1 drop into the left eye 4 (four) times a day for 7 days, Disp: 5 mL, Rfl: 0  •  pramipexole (MIRAPEX) 0.25 mg tablet, 1 1/2 tabs daily, Disp: 135 tablet, Rfl: 3  •  traZODone (DESYREL) 50 mg tablet, , Disp: , Rfl:   •  fluorouracil (EFUDEX) 5 % cream, Apply twice daily for 6 weeks to areas of involvement on nose. Wash hands after application. Apply evening application at least two hours prior to bed to avoid transfer to sheets. Practice good sun protection during and immediately following treatment. Keep away from household pets (very toxic to them)., Disp: 40 g, Rfl: 0          Whom besides the patient is providing clinical information about today's encounter?   NO ADDITIONAL HISTORIAN (patient alone provided history)    Physical Exam and Assessment/Plan by Diagnosis:      NEOPLASM OF UNCERTAIN BEHAVIOR OF SKIN    Physical Exam:  (Anatomic Location); (Size and Morphological Description); (Differential Diagnosis):  Right tip of nose-clear today. Of note few small scattered telangiectasias on nose.   Pertinent Positives:  Pertinent Negatives:    Additional History of Present Condition:  Pt present for recheck spot on the nose, tx with 5FU BID for 6weeks for suspected very small BCC vs actinic keratosis. Did not biopsy due to concern for specimen being too small to transport to lab. Patient noted area did get irritated and red.     Assessment and  "Plan:  I have discussed with the patient that a sample of skin via a \"skin biopsy” would be potentially helpful to further make a specific diagnosis under the microscope.  Based on a thorough discussion of this condition and the management approach to it (including a comprehensive discussion of the known risks, side effects and potential benefits of treatment), the patient (family) agrees to implement the following specific plan:    Continue to monitor site and contact us if any changes occur to site  Reassess at 05/01/25 skin check appt     ACTINIC KERATOSES  Physical Exam:  Anatomic Location Affected:  left forehead  Morphological Description:  Thin pink papule(s) with gritty scale     Assessment and Plan:  Based on a thorough discussion of this condition and the management approach to it (including a comprehensive discussion of the known risks, side effects and potential benefits of treatment), the patient (family) agrees to implement the following specific plan:  Treated with cryotherapy today; written and verbal consent obtained     PROCEDURE:  DESTRUCTION OF PRE-MALIGNANT LESIONS  After a thorough discussion of treatment options and risk/benefits/alternatives (including but not limited to local pain, scarring, dyspigmentation, blistering, and possible superinfection), verbal and written consent were obtained and the aforementioned lesions were treated on with cryotherapy using liquid nitrogen x 2 cycles for 5-10 seconds. The patient tolerated the procedure well, and after-care instructions were provided.     TOTAL NUMBER of 1 pre-malignant lesions were treated today on the ANATOMIC LOCATION: left forehead.       Patient instructions:  Your pre-cancerous lesions (called actinic keratosis) were treated with liquid nitrogen today. The treated areas will get more red, crusted over the next few days. There might be some blistering. Apply vaseline to the treated area for the next week to help it heal fully. Do not " pick at the area. Return in 3-4 weeks for another round of liquid nitrogen treatment if lesion(s)  fails to fully resolve.    Scribe Attestation    I,:  Alicja Lawrence MA am acting as a scribe while in the presence of the attending physician.:       I,:  Luisa Jacinto PA-C personally performed the services described in this documentation    as scribed in my presence.:

## 2025-04-22 ENCOUNTER — NEW PATIENT (OUTPATIENT)
Dept: URBAN - METROPOLITAN AREA CLINIC 6 | Facility: CLINIC | Age: 65
End: 2025-04-22

## 2025-04-22 DIAGNOSIS — H04.123: ICD-10-CM

## 2025-04-22 PROCEDURE — 92004 COMPRE OPH EXAM NEW PT 1/>: CPT

## 2025-04-22 ASSESSMENT — VISUAL ACUITY
OS_PH: 20/25-2
OD_CC: 20/40-2
OS_CC: 20/40+1
OD_PH: 20/30-2

## 2025-04-22 ASSESSMENT — TONOMETRY
OS_IOP_MMHG: 14
OD_IOP_MMHG: 17

## 2025-04-25 ENCOUNTER — OFFICE VISIT (OUTPATIENT)
Dept: FAMILY MEDICINE CLINIC | Facility: CLINIC | Age: 65
End: 2025-04-25
Payer: COMMERCIAL

## 2025-04-25 VITALS
WEIGHT: 189.4 LBS | OXYGEN SATURATION: 96 % | TEMPERATURE: 97.9 F | DIASTOLIC BLOOD PRESSURE: 76 MMHG | RESPIRATION RATE: 16 BRPM | HEART RATE: 100 BPM | HEIGHT: 63 IN | BODY MASS INDEX: 33.56 KG/M2 | SYSTOLIC BLOOD PRESSURE: 144 MMHG

## 2025-04-25 DIAGNOSIS — F33.0 MILD EPISODE OF RECURRENT MAJOR DEPRESSIVE DISORDER (HCC): ICD-10-CM

## 2025-04-25 DIAGNOSIS — K21.9 GASTROESOPHAGEAL REFLUX DISEASE, UNSPECIFIED WHETHER ESOPHAGITIS PRESENT: Primary | ICD-10-CM

## 2025-04-25 DIAGNOSIS — E78.2 MIXED HYPERLIPIDEMIA: ICD-10-CM

## 2025-04-25 DIAGNOSIS — R10.13 ABDOMINAL DISCOMFORT, EPIGASTRIC: ICD-10-CM

## 2025-04-25 DIAGNOSIS — Z13.6 SCREENING FOR CARDIOVASCULAR CONDITION: ICD-10-CM

## 2025-04-25 PROCEDURE — 99214 OFFICE O/P EST MOD 30 MIN: CPT | Performed by: FAMILY MEDICINE

## 2025-04-25 RX ORDER — SUCRALFATE ORAL 1 G/10ML
1 SUSPENSION ORAL 2 TIMES DAILY
Qty: 414 ML | Refills: 0 | Status: SHIPPED | OUTPATIENT
Start: 2025-04-25

## 2025-04-25 RX ORDER — CYCLOSPORINE 0.5 MG/ML
1 EMULSION OPHTHALMIC EVERY 12 HOURS
COMMUNITY
Start: 2025-04-22

## 2025-04-25 RX ORDER — PANTOPRAZOLE SODIUM 40 MG/1
40 TABLET, DELAYED RELEASE ORAL DAILY
Qty: 30 TABLET | Refills: 0 | Status: SHIPPED | OUTPATIENT
Start: 2025-04-25

## 2025-04-25 RX ORDER — BUPROPION HYDROCHLORIDE 150 MG/1
150 TABLET ORAL EVERY MORNING
Qty: 30 TABLET | Refills: 5 | Status: SHIPPED | OUTPATIENT
Start: 2025-04-25 | End: 2025-10-22

## 2025-04-25 NOTE — PROGRESS NOTES
Name: Charlotte Portillo      : 1960      MRN: [id number]  Encounter Provider: Gregg Maharaj MD  Encounter Date: 2025   Encounter department: [facility]  :  Assessment & Plan  Gastroesophageal reflux disease, unspecified whether esophagitis present  Reports epigastric pain x 3 months. No red flag signs such as weight loss, melena, or skin changes. Suspect GERD. Start PPI and carafate. May also be related to depression. This was also addressed. Plans see plan outlined under MDD. Labs ordered to look at liver function and lipase. If unchanged, will order CT and possibly cardiac testing. Call precautions  Orders:  •  pantoprazole (PROTONIX) 40 mg tablet; Take 1 tablet (40 mg total) by mouth daily  •  sucralfate (CARAFATE) 1 g/10 mL suspension; Take 10 mL (1 g total) by mouth 2 (two) times a day  •  Comprehensive metabolic panel; Future    Abdominal discomfort, epigastric    Orders:  •  pantoprazole (PROTONIX) 40 mg tablet; Take 1 tablet (40 mg total) by mouth daily  •  sucralfate (CARAFATE) 1 g/10 mL suspension; Take 10 mL (1 g total) by mouth 2 (two) times a day  •  Lipase; Future  •  Comprehensive metabolic panel; Future    Mild episode of recurrent major depressive disorder (HCC)  Depression Screening Follow-up Plan: Patient's depression screening was positive with a PHQ-9 score of 10.  Start Wellbutrin. Continue celexa. Will eventually consider tapering off celexa   Not interested in therapy at this time. Call with update in 4 weeks. Already has an appt with PCP next month. May follow up with either provider     Orders:  •  buPROPion (WELLBUTRIN XL) 150 mg 24 hr tablet; Take 1 tablet (150 mg total) by mouth every morning  •  Vitamin D 25 hydroxy; Future  •  Vitamin B12; Future  •  CBC and differential; Future  •  TSH, 3rd generation with Free T4 reflex; Future    Mixed hyperlipidemia  Lab Results   Component Value Date    CHOLESTEROL 284 (H) 2024    CHOLESTEROL 286 (H)  "07/29/2023    CHOLESTEROL 273 (H) 09/15/2022     Lab Results   Component Value Date    HDL 92 07/13/2024    HDL 89 07/29/2023    HDL 93 09/15/2022     Lab Results   Component Value Date    TRIG 72 07/13/2024    TRIG 96 07/29/2023    TRIG 56 09/15/2022     Lab Results   Component Value Date    NONHDLC 192 07/13/2024    NONHDLC 197 07/29/2023     Screening labs ordered   Orders:  •  Lipid panel; Future    Screening for cardiovascular condition    Orders:  •  Hemoglobin A1C; Future  •  Lipid panel; Future          Depression Screening and Follow-up Plan: Patient's depression screening was positive with a PHQ-9 score of 10.   Patient advised to follow-up with PCP for further management. Yes  History of Present Illness   Presents with epigastric pain and nausea x 3 months   No weight loss  No changes in BM   No recent travels  No changes in diet  Associated with vomiting.   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   Wonder if the medication is no longer helping     Review of Systems   Constitutional:  Positive for fatigue. Negative for unexpected weight change.   Respiratory: Negative.     Gastrointestinal:  Positive for abdominal pain, nausea and vomiting. Negative for constipation and diarrhea.   Skin:  Negative for color change.   Psychiatric/Behavioral:  Positive for decreased concentration and dysphoric mood. Negative for sleep disturbance.        Objective   /76 (BP Location: Left arm, Patient Position: Sitting, Cuff Size: Large)   Pulse 100   Temp 97.9 °F (36.6 °C) (Tympanic)   Resp 16   Ht 5' 3\" (1.6 m)   Wt 85.9 kg (189 lb 6.4 oz)   SpO2 96%   BMI 33.55 kg/m²      Physical Exam  Constitutional:       General: She is not in acute distress.     " Appearance: She is well-developed. She is not ill-appearing or toxic-appearing.   HENT:      Head: Normocephalic and atraumatic.      Mouth/Throat:      Mouth: Mucous membranes are moist.   Eyes:      Extraocular Movements: Extraocular movements intact.   Abdominal:      General: There is distension.      Palpations: Abdomen is soft. There is no mass.      Tenderness: There is no abdominal tenderness. There is no guarding or rebound.      Hernia: No hernia is present.   Neurological:      Mental Status: She is alert and oriented to person, place, and time.   Psychiatric:         Attention and Perception: Attention normal.         Mood and Affect: Mood is depressed. Affect is tearful.         Speech: Speech normal.         Behavior: Behavior normal.         Cognition and Memory: Cognition normal.   Administrative Statements   I have spent a total time of 35 minutes in caring for this patient on the day of the visit/encounter including Instructions for management, Patient and family education, Importance of tx compliance, Risk factor reductions, Impressions, Counseling / Coordination of care, Documenting in the medical record, Reviewing/placing orders in the medical record (including tests, medications, and/or procedures), and Obtaining or reviewing history  .

## 2025-04-25 NOTE — ASSESSMENT & PLAN NOTE
Depression Screening Follow-up Plan: Patient's depression screening was positive with a PHQ-9 score of 10. Start Wellbutrin. Continue celexa. Will eventually consider tapering off celexa  Not interested in therapy at this time. Call with update in 4 weeks. Already has an appt with PCP next month. May follow up with either provider     Orders:    buPROPion (WELLBUTRIN XL) 150 mg 24 hr tablet; Take 1 tablet (150 mg total) by mouth every morning    Vitamin D 25 hydroxy; Future    Vitamin B12; Future    CBC and differential; Future    TSH, 3rd generation with Free T4 reflex; Future

## 2025-04-25 NOTE — ASSESSMENT & PLAN NOTE
Lab Results   Component Value Date    CHOLESTEROL 284 (H) 07/13/2024    CHOLESTEROL 286 (H) 07/29/2023    CHOLESTEROL 273 (H) 09/15/2022     Lab Results   Component Value Date    HDL 92 07/13/2024    HDL 89 07/29/2023    HDL 93 09/15/2022     Lab Results   Component Value Date    TRIG 72 07/13/2024    TRIG 96 07/29/2023    TRIG 56 09/15/2022     Lab Results   Component Value Date    NONHDLC 192 07/13/2024    NONHDLC 197 07/29/2023     Screening labs ordered   Orders:    Lipid panel; Future

## 2025-04-28 ENCOUNTER — TELEPHONE (OUTPATIENT)
Age: 65
End: 2025-04-28

## 2025-04-28 ENCOUNTER — TELEPHONE (OUTPATIENT)
Dept: BARIATRICS | Facility: CLINIC | Age: 65
End: 2025-04-28

## 2025-04-28 NOTE — TELEPHONE ENCOUNTER
PA sucralfate (CARAFATE) 1 g/10 mL suspension SUBMITTED    to [facility]     via    []CMM-KEY:    [x]Surescripts-Case ID # [id number]   []Availity-Auth ID #  NDC #    []Faxed to plan   []Other website    []Phone call Case ID #      []PA sent as URGENT    All office notes, labs and other pertaining documents and studies sent. Clinical questions answered. Awaiting determination from insurance company.     Turnaround time for your insurance to make a decision on your Prior Authorization can take 7-21 business days.

## 2025-04-29 NOTE — TELEPHONE ENCOUNTER
PA  sucralfate (CARAFATE) 1 g/10 mL suspension APPROVED         Patient advised by          []MyChart Message  [x]Phone call   []LMOM  []L/M to call office as no active Communication consent on file  []Unable to leave detailed message as VM not approved on Communication consent       Pharmacy advised by    [x]Fax  []Phone call  []Secure Chat    Specialty Pharmacy    []

## 2025-05-01 ENCOUNTER — OFFICE VISIT (OUTPATIENT)
Dept: DERMATOLOGY | Facility: CLINIC | Age: 65
End: 2025-05-01
Payer: COMMERCIAL

## 2025-05-01 DIAGNOSIS — D22.5 MULTIPLE BENIGN MELANOCYTIC NEVI OF BOTH UPPER EXTREMITIES, BOTH LOWER EXTREMITIES, AND TRUNK: ICD-10-CM

## 2025-05-01 DIAGNOSIS — D22.61 MULTIPLE BENIGN MELANOCYTIC NEVI OF BOTH UPPER EXTREMITIES, BOTH LOWER EXTREMITIES, AND TRUNK: ICD-10-CM

## 2025-05-01 DIAGNOSIS — D18.01 CHERRY ANGIOMA: ICD-10-CM

## 2025-05-01 DIAGNOSIS — Z85.828 HISTORY OF BASAL CELL CANCER: ICD-10-CM

## 2025-05-01 DIAGNOSIS — L81.4 SOLAR LENTIGO: ICD-10-CM

## 2025-05-01 DIAGNOSIS — D22.71 MULTIPLE BENIGN MELANOCYTIC NEVI OF BOTH UPPER EXTREMITIES, BOTH LOWER EXTREMITIES, AND TRUNK: ICD-10-CM

## 2025-05-01 DIAGNOSIS — Z12.83 SKIN EXAM, SCREENING FOR CANCER: ICD-10-CM

## 2025-05-01 DIAGNOSIS — Z86.006 HISTORY OF MELANOMA IN SITU: Primary | ICD-10-CM

## 2025-05-01 DIAGNOSIS — D22.72 MULTIPLE BENIGN MELANOCYTIC NEVI OF BOTH UPPER EXTREMITIES, BOTH LOWER EXTREMITIES, AND TRUNK: ICD-10-CM

## 2025-05-01 DIAGNOSIS — D23.9 DERMATOFIBROMA: ICD-10-CM

## 2025-05-01 DIAGNOSIS — D48.5 NEOPLASM OF UNCERTAIN BEHAVIOR OF SKIN: ICD-10-CM

## 2025-05-01 DIAGNOSIS — D22.62 MULTIPLE BENIGN MELANOCYTIC NEVI OF BOTH UPPER EXTREMITIES, BOTH LOWER EXTREMITIES, AND TRUNK: ICD-10-CM

## 2025-05-01 DIAGNOSIS — L82.1 SEBORRHEIC KERATOSES: ICD-10-CM

## 2025-05-01 PROCEDURE — 99214 OFFICE O/P EST MOD 30 MIN: CPT

## 2025-05-01 PROCEDURE — 11104 PUNCH BX SKIN SINGLE LESION: CPT

## 2025-05-01 PROCEDURE — 88305 TISSUE EXAM BY PATHOLOGIST: CPT | Performed by: STUDENT IN AN ORGANIZED HEALTH CARE EDUCATION/TRAINING PROGRAM

## 2025-05-01 NOTE — PROGRESS NOTES
"[facility] Dermatology Clinic Note     Patient Name: Charlotte Portillo  Encounter Date: 5/1/2025       Have you been cared for by a [facility] Dermatologist in the last 3 years and, if so, which description applies to you? Yes. I have been here within the last 3 years, and my medical history has NOT changed since that time. I am not of child-bearing potential.     REVIEW OF SYSTEMS:  Have you recently had or currently have any of the following? No changes in my recent health.   PAST MEDICAL HISTORY:  Have you personally ever had or currently have any of the following?  If \"YES,\" then please provide more detail. No changes in my medical history.   HISTORY OF IMMUNOSUPPRESSION: Do you have a history of any of the following:  Systemic Immunosuppression such as Diabetes, Biologic or Immunotherapy, Chemotherapy, Organ Transplantation, Bone Marrow Transplantation or Prednisone?  No     Answering \"YES\" requires the addition of the dotphrase \"IMMUNOSUPPRESSED\" as the first diagnosis of the patient's visit.   FAMILY HISTORY:  Any \"first degree relatives\" (parent, brother, sister, or child) with the following?    No changes in my family's known health.   PATIENT EXPERIENCE:    Do you want the Dermatologist to perform a COMPLETE skin exam today including a clinical examination under the \"bra and underwear\" areas?  Yes  If necessary, do we have your permission to call and leave a detailed message on your Preferred Phone number that includes your specific medical information?  Yes      Allergies   Allergen Reactions    Monistat [Tioconazole] Other (See Comments)     Severe vaginal burning    Benzoin Rash    Meloxicam Rash    Nickel Itching and Rash      Current Outpatient Medications:     buPROPion (WELLBUTRIN XL) 150 mg 24 hr tablet, Take 1 tablet (150 mg total) by mouth every morning, Disp: 30 tablet, Rfl: 5    CALCIUM PO, Take 600 mg by mouth in the morning, Disp: , Rfl:     citalopram (CeleXA) 40 mg tablet, Take 1 tablet (40 mg " total) by mouth daily, Disp: 100 tablet, Rfl: 1    Multiple Vitamin (MULTI-VITAMIN DAILY PO), , Disp: , Rfl:     pantoprazole (PROTONIX) 40 mg tablet, Take 1 tablet (40 mg total) by mouth daily, Disp: 30 tablet, Rfl: 0    pramipexole (MIRAPEX) 0.25 mg tablet, 1 1/2 tabs daily, Disp: 135 tablet, Rfl: 3    Restasis 0.05 % ophthalmic emulsion, Administer 1 drop to both eyes every 12 (twelve) hours, Disp: , Rfl:     sucralfate (CARAFATE) 1 g/10 mL suspension, Take 10 mL (1 g total) by mouth 2 (two) times a day, Disp: 414 mL, Rfl: 0        Whom besides the patient is providing clinical information about today's encounter?   NO ADDITIONAL HISTORIAN (patient alone provided history)    Physical Exam and Assessment/Plan by Diagnosis:    HISTORY OF MELANOMA IN SITU     Physical Exam:  Anatomic Location Affected:  left upper arm   Morphological Description of Scar:  well healed   Year Treated: 04/2018  Suspected Recurrence: no  Regional adenopathy: no     Additional History of Present Condition:  History of melanoma in situ with no sign of recurrence . Patient UTD on GYN, dental, eye exams. The patient denies fevers, chills, night sweats, weight loss, or loss of appetite and is feeling at their baseline health.        Assessment and Plan:  Based on a thorough discussion of this condition and the management approach to it (including a comprehensive discussion of the known risks, side effects and potential benefits of treatment), the patient (family) agrees to implement the following specific plan:  Monitor for recurrence.   Continue 6 month skin exams   Apply sunscreen SPF 30+, reapplying every two hours. Wear sun protective clothing, long sleeve shirts, sunglasses, and wide brimmed hats.         HISTORY OF BASAL CELL CARCINOMA     Physical Exam:  Anatomic Location Affected:  right posterior shoulder  Morphological Description of scar:  well healed   Suspected Recurrence: No     Additional History of Present Condition:  History  "of basal cell carcinoma with no sign of recurrence. Treated via excision on 4/6/2022 by Dr. Crain.      Assessment and Plan:  Based on a thorough discussion of this condition and the management approach to it (including a comprehensive discussion of the known risks, side effects and potential benefits of treatment), the patient (family) agrees to implement the following specific plan:  Monitor for recurrence.   Continue 6 month skin exams   Apply sunscreen SPF 30+, reapplying every two hours. Wear sun protective clothing, long sleeve shirts, sunglasses, and wide brimmed hats.        NEOPLASM OF UNCERTAIN BEHAVIOR OF SKIN     Physical Exam:  (Anatomic Location); (Size and Morphological Description); (Differential Diagnosis):  Specimen A: Central nasal tip; 2 mm crusted bleeding macule ; DDX; actinic keratosis vs basal cell carcinoma vs squamous cell carcinoma              Additional History of Present Condition:  Per prior note, spot on nose to be rehcecked during skin exam. It has been treated with Efudex BID for 6 weeks for suspected very small BCC vs actinic keratosis. Did not biopsy due to concern for specimen being too small to transport to lab. Patient states spot has returned since last visit on 2/4/2025. She reports the spot does scab, and when she washes her face it comes off and then starts the scabbing process all over again.     Assessment and Plan:  I have discussed with the patient that a sample of skin via a \"skin biopsy” would be potentially helpful to further make a specific diagnosis under the microscope.  Based on a thorough discussion of this condition and the management approach to it (including a comprehensive discussion of the known risks, side effects and potential benefits of treatment), the patient (family) agrees to implement the following specific plan:  Punch biopsy done today. See procedure notes below. Written consent obtained today.    PROCEDURE NOTE:  PUNCH BIOPSY      Performing " Physician:     Anatomic Location; Clinical Description with size (cm); Pre-Op Diagnosis:    Specimen A: Central nasal tip; 2 mm crusted bleeding macule ; DDX; actinic keratosis vs basal cell carcinoma vs squamous cell carcinoma        Anesthesia: 1% xylocaine with epi       Topical anesthesia: None       Indications: To indicate diagnosis and management plan.    Procedure Details     Patient informed of the risks (including bleeding,scaring and infection) and benefits of the procedure explained. Verbal and written informed consent obtained. The area was prepped and draped in the usual fashion. Anesthesia was obtained with 1% lidocaine with epinephrine. The skin was then stretched perpendicular to the skin tension lines and a punch biopsy to an appropriate sampling depth was obtained with a 2 mm punch with a forceps and iris scissors.     Hemostasis was obtained with 6-0 Ethilon x 1 sutures.     Complications:  None    Specimen has been sent for review by Dermatopathology.    Plan:  1. Instructed to keep the wound dry and covered for 24-48h and clean thereafter.  2. Warning signs of infection were reviewed.    3. Recommended that the patient use acetaminophen as needed for pain  4. Sutures if any should be removed in 5-7 days    Standard post-procedure care has been explained and has been included in written form within the patient's copy of Informed Consent.    DERMATOFIBROMA    Physical Exam:  Anatomic Location Affected:  right posterior upper arm, right knee  Morphological Description:  firm pink papule with peripheral reticular network , + dimple sign       Additional History of Present Condition:  noted on exam    Assessment and Plan:  Based on a thorough discussion of this condition and the management approach to it (including a comprehensive discussion of the known risks, side effects and potential benefits of treatment), the patient (family) agrees to implement the following specific plan:  Reassure  "benign      MELANOCYTIC NEVI (\"Moles\")    Physical Exam:  Anatomic Location Affected:   Mostly on sun-exposed areas of the trunk and extremities   Morphological Description:  Scattered, 1-4mm round to ovoid, symmetrical-appearing, even bordered, skin colored to dark brown macules/papules      Additional History of Present Condition:  Present on exam. Denies any pain, itch, bleeding. Present for years. Denies actively changing or growing moles.     Assessment and Plan:  Based on a thorough discussion of this condition and the management approach to it (including a comprehensive discussion of the known risks, side effects and potential benefits of treatment), the patient (family) agrees to implement the following specific plan:  Reassured benign.   Monitor for changes. ABCDE's of melanoma handout provided.   Practice sun protection. Apply broad spectrum (UVA and UVB) sunscreen, SPF 30 or higher every 2 hours. Wear sun protective clothing, hats, and sunglasses.     LENTIGO    Physical Exam:  Anatomic Location Affected:  sun exposed areas of trunk and extremities   Morphological Description:  multiple scattered tan to brown evenly pigmented macules      Additional History of Present Condition:  Present on exam.     Assessment and Plan:  Based on a thorough discussion of this condition and the management approach to it (including a comprehensive discussion of the known risks, side effects and potential benefits of treatment), the patient (family) agrees to implement the following specific plan:  Reassured benign.   Practice sun protection. Apply broad spectrum (UVA and UVB) sunscreen, SPF 30 or higher every 2 hours. Wear sun protective clothing, hats, and sunglasses.         SEBORRHEIC KERATOSIS; NON-INFLAMED    Physical Exam:  Anatomic Location Affected:  trunk and extremities   Morphological Description:  Flat and raised, waxy, smooth to warty textured, yellow to brownish-grey to dark brown to blackish, discrete, " "\"stuck-on\" appearing papules.      Additional History of Present Condition:  Present on exam. Patient denies any itching.     Assessment and Plan:  Based on a thorough discussion of this condition and the management approach to it (including a comprehensive discussion of the known risks, side effects and potential benefits of treatment), the patient (family) agrees to implement the following specific plan:  Reassured benign.         ANGIOMA (\"CHERRY ANGIOMA\")     Physical Exam:  Anatomic Location: scattered across trunk and extremities   Morphologic Description: 1-2 mm firm red to maroon to purples to blue discrete papule, on dermoscopy lacunae  by white septae seen       Additional History of Present Condition:  Present on exam.     Plan:  Reassured benign.       Scribe Attestation      I,:  Tawana Lazo MA am acting as a scribe while in the presence of the attending physician.:       I,:  Yoli Gramajo PA-C personally performed the services described in this documentation    as scribed in my presence.:             "

## 2025-05-01 NOTE — PATIENT INSTRUCTIONS
"HISTORY OF MELANOMA IN SITU     The patient denies fevers, chills, night sweats, weight loss, headache, visual change, bone pain, paraesthesias, cough, SOB, abdominal pain, n/v/d, and is feeling at their baseline health.      Assessment and Plan:  Based on a thorough discussion of this condition and the management approach to it (including a comprehensive discussion of the known risks, side effects and potential benefits of treatment), the patient (family) agrees to implement the following specific plan:  Continue 6 month skin exams   Apply sunscreen SPF 30+, reapplying every two hours. Wear sun protective clothing, long sleeve shirts, sunglasses, and wide brimmed hats.      What happens at follow-up?  The main purpose of follow-up is to detect recurrences early (metastatic melanoma), but it also offers an opportunity to diagnose a new primary melanoma at the first possible opportunity. A second invasive melanoma occurs in 5-10% of melanoma patients and a new melanoma in situ is diagnosed in more than 20% of melanoma patients.     Our practice makes the following recommendations for follow-up for patients with invasive melanoma.  At-least \"monthly\" self-skin examinations   Routine skin checks by a board certified dermatologist  Follow-up intervals are \"every 3 months\" within 2 years of a new melanoma diagnosis; \"every 6 months\" between 2-4 years of a new melanoma diagnosis; and \"annually\" after 4 years of a new melanoma diagnosis  Individual patient's needs should be considered before an appropriate follow-up is offered  Provide education and support to help the patient adjust to their illness     Follow-up appointments should include:  A check of the scar where the primary melanoma was removed  Checking the regional lymph nodes  A general skin examination  A full physical examination at least annually by your primary care physician     In those with more advanced primary disease, follow-up may include:  Blood " tests  Imaging: ultrasound, X-ray, CT, MRI and PET scan.     Most tests are not worthwhile for patients with stage 1 or 2 melanoma unless there are signs or symptoms of disease recurrence or metastasis. No tests are necessary for healthy patients who have remained well for five years or longer after removal of their melanoma.     What is the outlook for patients with melanoma?  Melanoma in situ is cured by excision because it has no potential to spread around the body.  The risk of spread and ultimate death from invasive melanoma depends on several factors, but the main one is the Breslow thickness of the melanoma at the time it was surgically removed.  Metastases are rare for melanomas < 0.75 mm and the risk for tumours 0.75-1 mm thick is about 5%. The risk steadily increases with thickness so that melanomas > 4 mm have a risk of metastasis of about 40%.     Melanoma is a potentially serious type of skin cancer, in which there is uncontrolled growth of melanocytes (pigment cells). Melanoma is sometimes called malignant melanoma.  Normal melanocytes are found in the basal layer of the epidermis (the outer layer of skin). Melanocytes produce a protein called melanin, which protects skin cells by absorbing ultraviolet (UV) radiation. Melanocytes are found in equal numbers in black and white skin, but melanocytes in black skin produce much more melanin. People with dark brown or black skin are very much less likely to be damaged by UV radiation than those with white skin.      HISTORY OF BASAL CELL CARCINOMA      Assessment and Plan:  Based on a thorough discussion of this condition and the management approach to it (including a comprehensive discussion of the known risks, side effects and potential benefits of treatment), the patient (family) agrees to implement the following specific plan:  Continue 6 month skin exams   Apply sunscreen SPF 30+, reapplying every two hours. Wear sun protective clothing, long sleeve  "shirts, sunglasses, and wide brimmed hats.      How can basal cell carcinoma be prevented?  The most important way to prevent BCC is to avoid sunburn. This is especially important in childhood and early life. Fair skinned individuals and those with a personal or family history of BCC should protect their skin from sun exposure daily, year-round and lifelong.  Stay indoors or under the shade in the middle of the day   Wear covering clothing   Apply high protection factor SPF50+ broad-spectrum sunscreens generously to exposed skin if outdoors   Avoid indoor tanning (sun beds, solaria)  Oral nicotinamide (vitamin B3) in a dose of 500 mg twice daily may reduce the number and severity of BCCs.     What is the outlook for basal cell carcinoma?  Most BCCs are cured by treatment. Cure is most likely if treatment is undertaken when the lesion is small.  About 50% of people with BCC develop a second one within 3 years of the first. They are also at increased risk of other skin cancers, especially melanoma. Regular self-skin examinations and long-term annual skin checks by an experienced health professional are recommended.     NEOPLASM OF UNCERTAIN BEHAVIOR OF SKIN      Assessment and Plan:  I have discussed with the patient that a sample of skin via a \"skin biopsy” would be potentially helpful to further make a specific diagnosis under the microscope.  Based on a thorough discussion of this condition and the management approach to it (including a comprehensive discussion of the known risks, side effects and potential benefits of treatment), the patient (family) agrees to implement the following specific plan:     Punch biopsy done today. See procedure notes below. Written consent obtained today.    PROCEDURE NOTE:  PUNCH BIOPSY     Indications: To indicate diagnosis and management plan.    Procedure Details     Patient informed of the risks (including bleeding,scaring and infection) and benefits of the procedure explained. " "Verbal and written informed consent obtained. The area was prepped and draped in the usual fashion. Anesthesia was obtained with 1% lidocaine with epinephrine. The skin was then stretched perpendicular to the skin tension lines and a punch biopsy to an appropriate sampling depth was obtained with a 2 mm punch with a forceps and iris scissors.     Hemostasis was obtained with 6-0 Ethilon x 1 sutures.     Complications:  None    Specimen has been sent for review by Dermatopathology.    Plan:  1. Instructed to keep the wound dry and covered for 24-48h and clean thereafter.  2. Warning signs of infection were reviewed.    3. Recommended that the patient use acetaminophen as needed for pain  4. Sutures if any should be removed in 7 days    Standard post-procedure care has been explained and has been included in written form within the patient's copy of Informed Consent.    DERMATOFIBROMA    Assessment and Plan:  Based on a thorough discussion of this condition and the management approach to it (including a comprehensive discussion of the known risks, side effects and potential benefits of treatment), the patient (family) agrees to implement the following specific plan:  Reassure benign    Assessment and Plan:  A dermatofibroma is a common benign fibrous nodule that most often arises on the skin of the lower legs.  A dermatofibroma is also called a \"cutaneous fibrous histiocytoma.\"  Dermatofibromas occur at all ages and in people of every ethnicity. They are more common in women than in men.    It is not clear if dermatofibroma is a reactive process or if it is a neoplasm. The lesions are made up of proliferating fibroblasts. Histiocytes may also be involved.  They are sometimes attributed to an insect bite or ingrownhair or local trauma, but not consistently. They may be more numerous in patients with altered immunity.    Dermatofibromas most often occur on the legs and arms, but may also arise on the trunk or any site " "of the body.  Typical clinical features include the following:  People may have 1 or up to 15 lesions.  Size varies from 0.5-1.5 cm diameter; most lesions are 7-10 mm diameter.  They are firm nodules tethered to the skin surface and mobile over subcutaneous tissue.  The skin \"dimples\" on pinching the lesion.  Color may be pink to light brown in white skin, and dark brown to black in dark skin; some appear paler in the center.  They do not usually cause symptoms, but they are sometimes painful or itchy.  Because they are often raised lesions, they may be traumatized, for example by a razor.  Occasionally dozens may erupt within a few months, usually in the setting of immunosuppression (for example autoimmune disease, cancer or certain medications).  Dermatofibroma does not give rise to cancer. However, occasionally, it may be mistaken for dermatofibrosarcoma or desmoplastic melanoma.    A dermatofibroma is harmless and seldom causes any symptoms. Usually, only reassurance is needed. If it is nuisance or causing concern, the lesion can be removed surgically, resulting in a scar that is, by definition, usually longer in diameter than the widest portion of the dermatofibroma.  Cryotherapy, shave biopsy and laser surgery are rarely completely successful.  Skin punch biopsy or incisional biopsy may be undertaken if there is an atypical feature such as recent enlargement, ulceration, or asymmetrical structures and colours on dermatoscopy.     SEBORRHEIC KERATOSES  - Relevant exam: Scattered over the trunk/extremities, face are waxy brown to black plaques and papules with stuck on appearance and consistent dermoscopy  - Exam and clinical history consistent with seborrheic keratoses  - Counseled that these are benign growths that do not require treatment     MELANOCYTIC NEVI  -Relevant exam: Scattered over the trunk/extremities are homogenously pigmented brown macules and papules. ELM performed and without concerning " findings. No outliers unless otherwise noted in today's note  - Exam and clinical history consistent with melanocytic nevi  - Counseled to return to clinic prior to scheduled appointment should any of these lesions change or should any new lesions of concern arise  - Counseled on use of sun protection daily. Reviewed latest FDA sunscreen guidelines, including use of broad spectrum (UVA and UVB blocking) sunscreen or sun protective clothing with SPF 30-50 every 2-3 hours and reapplied after exposure to water     LENTIGINES  OTHER SKIN CHANGES DUE TO CHRONIC EXPOSURE TO NONIONIZING RADIATION  - Relevant exam: Over sun exposed areas are brown macules. ELM performed and without concerning findings.  - Exam and clinical history consistent with lentigines.  - Counseled to return to clinic prior to scheduled appointment should any of these lesions change or should any new lesions of concern arise.  - Recommended use of sunscreen as above and below.     CHERRY ANGIOMAS  - Relevant exam: Scattered over the trunk/extremities are red papules  - Exam and clinical history consistent with cherry angiomas  - Educated that these are benign

## 2025-05-04 ENCOUNTER — APPOINTMENT (OUTPATIENT)
Dept: LAB | Facility: CLINIC | Age: 65
End: 2025-05-04
Payer: COMMERCIAL

## 2025-05-04 DIAGNOSIS — Z13.6 SCREENING FOR CARDIOVASCULAR CONDITION: ICD-10-CM

## 2025-05-04 DIAGNOSIS — F33.0 MILD EPISODE OF RECURRENT MAJOR DEPRESSIVE DISORDER (HCC): ICD-10-CM

## 2025-05-04 DIAGNOSIS — R10.13 ABDOMINAL DISCOMFORT, EPIGASTRIC: ICD-10-CM

## 2025-05-04 DIAGNOSIS — E78.2 MIXED HYPERLIPIDEMIA: ICD-10-CM

## 2025-05-04 DIAGNOSIS — K21.9 GASTROESOPHAGEAL REFLUX DISEASE, UNSPECIFIED WHETHER ESOPHAGITIS PRESENT: ICD-10-CM

## 2025-05-04 LAB
25(OH)D3 SERPL-MCNC: 35.8 NG/ML (ref 30–100)
ALBUMIN SERPL BCG-MCNC: 4.1 G/DL (ref 3.5–5)
ALP SERPL-CCNC: 47 U/L (ref 34–104)
ALT SERPL W P-5'-P-CCNC: 14 U/L (ref 7–52)
ANION GAP SERPL CALCULATED.3IONS-SCNC: 7 MMOL/L (ref 4–13)
AST SERPL W P-5'-P-CCNC: 16 U/L (ref 13–39)
BASOPHILS # BLD AUTO: 0.05 THOUSANDS/ÂΜL (ref 0–0.1)
BASOPHILS NFR BLD AUTO: 1 % (ref 0–1)
BILIRUB SERPL-MCNC: 0.82 MG/DL (ref 0.2–1)
BUN SERPL-MCNC: 16 MG/DL (ref 5–25)
CALCIUM SERPL-MCNC: 8.9 MG/DL (ref 8.4–10.2)
CHLORIDE SERPL-SCNC: 102 MMOL/L (ref 96–108)
CHOLEST SERPL-MCNC: 281 MG/DL (ref ?–200)
CO2 SERPL-SCNC: 29 MMOL/L (ref 21–32)
CREAT SERPL-MCNC: 0.68 MG/DL (ref 0.6–1.3)
EOSINOPHIL # BLD AUTO: 0.14 THOUSAND/ÂΜL (ref 0–0.61)
EOSINOPHIL NFR BLD AUTO: 2 % (ref 0–6)
ERYTHROCYTE [DISTWIDTH] IN BLOOD BY AUTOMATED COUNT: 12.2 % (ref 11.6–15.1)
EST. AVERAGE GLUCOSE BLD GHB EST-MCNC: 120 MG/DL
GFR SERPL CREATININE-BSD FRML MDRD: 92 ML/MIN/1.73SQ M
GLUCOSE P FAST SERPL-MCNC: 111 MG/DL (ref 65–99)
HBA1C MFR BLD: 5.8 %
HCT VFR BLD AUTO: 39.5 % (ref 34.8–46.1)
HDLC SERPL-MCNC: 85 MG/DL
HGB BLD-MCNC: 13.1 G/DL (ref 11.5–15.4)
IMM GRANULOCYTES # BLD AUTO: 0.02 THOUSAND/UL (ref 0–0.2)
IMM GRANULOCYTES NFR BLD AUTO: 0 % (ref 0–2)
LDLC SERPL CALC-MCNC: 164 MG/DL (ref 0–100)
LIPASE SERPL-CCNC: 21 U/L (ref 11–82)
LYMPHOCYTES # BLD AUTO: 1.74 THOUSANDS/ÂΜL (ref 0.6–4.47)
LYMPHOCYTES NFR BLD AUTO: 29 % (ref 14–44)
MCH RBC QN AUTO: 30.8 PG (ref 26.8–34.3)
MCHC RBC AUTO-ENTMCNC: 33.2 G/DL (ref 31.4–37.4)
MCV RBC AUTO: 93 FL (ref 82–98)
MONOCYTES # BLD AUTO: 0.61 THOUSAND/ÂΜL (ref 0.17–1.22)
MONOCYTES NFR BLD AUTO: 10 % (ref 4–12)
NEUTROPHILS # BLD AUTO: 3.49 THOUSANDS/ÂΜL (ref 1.85–7.62)
NEUTS SEG NFR BLD AUTO: 58 % (ref 43–75)
NONHDLC SERPL-MCNC: 196 MG/DL
NRBC BLD AUTO-RTO: 0 /100 WBCS
PLATELET # BLD AUTO: 257 THOUSANDS/UL (ref 149–390)
PMV BLD AUTO: 9.1 FL (ref 8.9–12.7)
POTASSIUM SERPL-SCNC: 4.1 MMOL/L (ref 3.5–5.3)
PROT SERPL-MCNC: 6.9 G/DL (ref 6.4–8.4)
RBC # BLD AUTO: 4.26 MILLION/UL (ref 3.81–5.12)
SODIUM SERPL-SCNC: 138 MMOL/L (ref 135–147)
TRIGL SERPL-MCNC: 160 MG/DL (ref ?–150)
TSH SERPL DL<=0.05 MIU/L-ACNC: 1.33 UIU/ML (ref 0.45–4.5)
VIT B12 SERPL-MCNC: 387 PG/ML (ref 180–914)
WBC # BLD AUTO: 6.05 THOUSAND/UL (ref 4.31–10.16)

## 2025-05-04 PROCEDURE — 80053 COMPREHEN METABOLIC PANEL: CPT

## 2025-05-04 PROCEDURE — 85025 COMPLETE CBC W/AUTO DIFF WBC: CPT

## 2025-05-04 PROCEDURE — 36415 COLL VENOUS BLD VENIPUNCTURE: CPT

## 2025-05-04 PROCEDURE — 80061 LIPID PANEL: CPT

## 2025-05-04 PROCEDURE — 82607 VITAMIN B-12: CPT

## 2025-05-04 PROCEDURE — 84443 ASSAY THYROID STIM HORMONE: CPT

## 2025-05-04 PROCEDURE — 83690 ASSAY OF LIPASE: CPT

## 2025-05-04 PROCEDURE — 82306 VITAMIN D 25 HYDROXY: CPT

## 2025-05-04 PROCEDURE — 83036 HEMOGLOBIN GLYCOSYLATED A1C: CPT

## 2025-05-05 ENCOUNTER — RESULTS FOLLOW-UP (OUTPATIENT)
Dept: DERMATOLOGY | Facility: CLINIC | Age: 65
End: 2025-05-05

## 2025-05-05 DIAGNOSIS — C44.311 BASAL CELL CARCINOMA OF NOSE: Primary | ICD-10-CM

## 2025-05-05 PROCEDURE — 88305 TISSUE EXAM BY PATHOLOGIST: CPT | Performed by: STUDENT IN AN ORGANIZED HEALTH CARE EDUCATION/TRAINING PROGRAM

## 2025-05-05 NOTE — RESULT ENCOUNTER NOTE
DERMATOPATHOLOGY RESULT NOTE    Results reviewed which demonstrated a BASAL CELL CARCINOMA (SUPERFICIAL AND NODULAR TYPE) of the central nasal tip. This is malignant and meets criteria for Mohs surgery. Patient is already scheduled for 6 month skin exams. Called patient and discussed results. She expressed understanding and would like to proceed with Mohs surgery. Referral placed.       Instructions for Clinical Derm Team:   (remember to route Result Note to appropriate staff):    None    Result & Plan by Specimen:    Specimen A: malignant  Plan: MOHs, 6 month skin exams.        [id number]  Order: [id number]   Status: Final result      Dx: Neoplasm of uncertain behavior of skin    Test Result Released: Yes (seen)    View Follow-Up Encounter     Component  Ref Range & Units (hover)   Case Report  Surgical Pathology Report                         Case: [id number]                                  Authorizing Provider:  Yoli Gramajo PA-C     Collected:           05/01/2025 1055              Ordering Location:     [facility]      Received:            05/01/2025 1055                                     [place]                                                                Pathologist:           Katharina Crystal MD                                                          Specimen:    Skin, Other, A: central nasal tip                                                        Final Diagnosis  A. Skin, central nasal tip, punch biopsy:    BASAL CELL CARCINOMA (SUPERFICIAL AND NODULAR TYPE); extends to the tissue edges.      Electronically signed by Katharina Crystal MD on 5/5/2025 at 1218 EDT  Additional Information   All reported additional testing was performed with appropriately reactive controls.  These tests were developed and their performance characteristics determined by [facility] or appropriate performing facility, though some tests may be performed on tissues which have not been  "validated for performance characteristics (such as staining performed on alcohol exposed cell blocks and decalcified tissues).  Results should be interpreted with caution and in the context of the patients' clinical condition. These tests may not be cleared or approved by the U.S. Food and Drug Administration, though the FDA has determined that such clearance or approval is not necessary. These tests are used for clinical purposes and they should not be regarded as investigational or for research. This laboratory has been approved by CLIA 88, designated as a high-complexity laboratory and is qualified to perform these tests.  .  Gross Description   A. The specimen is received in formalin, labeled with the patient's name and hospital number, and is designated \" central nasal tip\".  The specimen consists of a tan portion of skin measuring 0.1 to 0.2 cm in diameter, excised to a depth of 0.3 cm.  The skin surface appears friable and erythematous.  The margin is inked green.  The skin surface is inked red.  Entirely submitted. One cassette.  Between sponges.    Note: The estimated total formalin fixation time based upon information provided by the submitting clinician and the standard processing schedule is under 72 hours.    [facility]  Clinical Information   ATTENTION:  DERMPATH GROUP    SPECIMEN A; Skin; Anatomic Location:central nasal tip; Procedure/Protocol: Skin Specimen (submit in FORMALIN):Punch Biopsy (when a punch biopsy tool is used; simple closure is included) (CPT 71015; each additional punch biopsy is CPT 91440)  64 y.o. year old  Female with a Morphological Description: 2mm crusted bleeding papule  Differential Diagnosis and/or Specific Clinical Question:actinic keratosis vs basal cell carcinoma vs squamous cell carcinoma  Resulting Agency [facility]          Specimen Collected: 05/01/25 10:55 AM Last Resulted: 05/05/25 12:18 PM      "

## 2025-05-05 NOTE — RESULT ENCOUNTER NOTE
DERMATOPATHOLOGY RESULT NOTE    Results reviewed which demonstrated a BASAL CELL CARCINOMA (SUPERFICIAL AND NODULAR TYPE) of the central nasal tip. This is malignant. Mohs surgery is recommended. Patient is already scheduled for 6 month skin exams. Called patient and discussed results. She expressed understanding. All questions were answered. She would like to proceed with Mohs surgery. Referral placed.       Instructions for Clinical Derm Team:   (remember to route Result Note to appropriate staff):    None    Result & Plan by Specimen:    Specimen A: malignant  Plan: MOHs, 6 month skin exams.     [id number]  Order: [id number]   Status: Final result      Dx: Neoplasm of uncertain behavior of skin    Test Result Released: Yes (seen)    View Follow-Up Encounter     Component  Ref Range & Units (hover)   Case Report  Surgical Pathology Report                         Case: [id number]                                  Authorizing Provider:  Yoli Gramajo PA-C     Collected:           05/01/2025 [facility]              Ordering Location:     [facility]      Received:            05/01/2025 [address] [place] [address]                                                                Pathologist:           Katharina Crystal MD                                                          Specimen:    Skin, Other, A: central nasal tip                                                        Final Diagnosis  A. Skin, central nasal tip, punch biopsy:    BASAL CELL CARCINOMA (SUPERFICIAL AND NODULAR TYPE); extends to the tissue edges.      Electronically signed by Katharina Crystal MD on 5/5/2025 at 1218 EDT  Additional Information   All reported additional testing was performed with appropriately reactive controls.  These tests were developed and their performance characteristics determined by [facility] or appropriate performing facility, though some tests may be performed on tissues  "which have not been validated for performance characteristics (such as staining performed on alcohol exposed cell blocks and decalcified tissues).  Results should be interpreted with caution and in the context of the patients' clinical condition. These tests may not be cleared or approved by the U.S. Food and Drug Administration, though the FDA has determined that such clearance or approval is not necessary. These tests are used for clinical purposes and they should not be regarded as investigational or for research. This laboratory has been approved by CLIA 88, designated as a high-complexity laboratory and is qualified to perform these tests.  .  Gross Description   A. The specimen is received in formalin, labeled with the patient's name and hospital number, and is designated \" central nasal tip\".  The specimen consists of a tan portion of skin measuring 0.1 to 0.2 cm in diameter, excised to a depth of 0.3 cm.  The skin surface appears friable and erythematous.  The margin is inked green.  The skin surface is inked red.  Entirely submitted. One cassette.  Between sponges.    Note: The estimated total formalin fixation time based upon information provided by the submitting clinician and the standard processing schedule is under 72 hours.    [facility]  Clinical Information   ATTENTION:  DERMPATH GROUP    SPECIMEN A; Skin; Anatomic Location:central nasal tip; Procedure/Protocol: Skin Specimen (submit in FORMALIN):Punch Biopsy (when a punch biopsy tool is used; simple closure is included) (CPT 29023; each additional punch biopsy is CPT 19400)  64 y.o. year old  Female with a Morphological Description: 2mm crusted bleeding papule  Differential Diagnosis and/or Specific Clinical Question:actinic keratosis vs basal cell carcinoma vs squamous cell carcinoma  Resulting Agency [facility]          Specimen Collected: 05/01/25 10:55 AM Last Resulted: 05/05/25 12:18 PM      "

## 2025-05-06 ENCOUNTER — TELEPHONE (OUTPATIENT)
Dept: DERMATOLOGY | Facility: CLINIC | Age: 65
End: 2025-05-06

## 2025-05-06 ENCOUNTER — CLINICAL SUPPORT (OUTPATIENT)
Dept: DERMATOLOGY | Facility: CLINIC | Age: 65
End: 2025-05-06

## 2025-05-06 DIAGNOSIS — Z48.02 ENCOUNTER FOR REMOVAL OF SUTURES: Primary | ICD-10-CM

## 2025-05-06 PROCEDURE — RECHECK: Performed by: DERMATOLOGY

## 2025-05-06 NOTE — PROGRESS NOTES
"Suture removal    Date/Time: 5/6/2025 2:30 PM    Performed by: Amy Tamayo RN  Authorized by: Angelika Powell MD  Universal Protocol:  procedure performed by consultantConsent: Verbal consent obtained. Written consent not obtained.  Risks and benefits: risks, benefits and alternatives were discussed  Consent given by: patient  Time out: Immediately prior to procedure a \"time out\" was called to verify the correct patient, procedure, equipment, support staff and site/side marked as required.  Timeout called at: 5/6/2025 2:34 PM.  Patient understanding: patient states understanding of the procedure being performed  Patient consent: the patient's understanding of the procedure matches consent given  Procedure consent: procedure consent matches procedure scheduled  Relevant documents: relevant documents not present or verified  Test results: test results not available  Site marked: the operative site was not marked  Radiology Images displayed and confirmed. If images not available, report reviewed: imaging studies not available  Patient identity confirmed: verbally with patient      Patient location:  Clinic  Location:     Location:  Head/neck    Head/neck location:  Nose (central nasal tip)  Procedure details:     Tools used:  Suture removal kit    Wound appearance:  No sign(s) of infection, good wound healing, clean, pink and moist    Number of sutures removed:  2  Post-procedure details:     Post-procedure assessment: Vaseline applied.    Patient tolerance of procedure:  Tolerated well, no immediate complications    Before suture removal     After suture removal     "

## 2025-05-06 NOTE — LETTER
Charlotte Portillo     1960    [address] [place] [address]    Dear Charlotte Portillo,    You are scheduled to have the Mohs procedure on October 22, 2025 at 11 am for nose with Dr. Jane Espino. Your surgery will be located in [facility] at the [facility] our address is [address] [place] [address]. Once you arrive please check in with our front staff in [address] and then wait Mohs waiting room [address].  If you have someone bringing you to your appointment they may wait in the waiting room or accompany you in your visit.     Below you will find some pre-op instructions along with some information regarding the Mohs procedure.     If you have any questions please call our office at [phone].     Thank you,    [facility] Department         PRE-OPERATIVE INSTRUCTIONS - MOHS    Before your scheduled surgery, there are a number of important precautions and positive steps you should take to help prepare yourself for a successful treatment and speedy recovery.    Some of the steps, which are listed below, may seem unnecessary and inconvenient, but they are important. For example, when you stop smoking, you increase your ability to heal. Occasionally, there may be valid reasons, personal or medical, why you can't comply. In such cases, please call the office so we can discuss possible ways to overcome any obstacles you may be encountering.    If you have any questions about the surgery, or remember additional medical information that you forgot to mention to our staff, please contact the office prior to your surgery.    GENERAL INFORMATION REGARDING MOHS MICROGRAPHIC SURGERY    Mohs surgery is a specialized technique for the removal of skin cancer developed by Dr. Frederick Mohs over 50 years ago to improve the cure rates of skin cancer. Traditionally, skin cancers are treated by destructive methods (radiation, freezing, scraping, and burning) or excision  (cutting out the tissue with standards margins and sending it to an outside laboratory for testing). These methods all yield cure rates between 65%-94%. However, for cancers located in cosmetically sensitive areas, large tumors, or tumors unsuccessfully treated by other means, Mohs surgery offers a higher cure rate. In most cases, Mohs surgery provides you with a 99% cure rate for primary (previously untreated) basal cell cancer and a 95% cure rate for primary squamous cell cancer. In Mohs surgery, tissue is removed and processed in a way that we are able to check 100% of the margins, giving the highest cure rate for any method of treating skin cancers while providing maximal preservation of normal skin. This allows the surgeon to produce an optimal cosmetic result for the patient by maximizing the amount of tissue removed yielding as small a scar as possible    On the day of surgery, you will be given local anesthesia only (similar to what was given to you during your initial biopsy). You will remain awake. You will verify the location of the skin cancer prior to the onset of the surgery. Once the area is numb, the tissue containing the skin cancer will be removed, taking a small safety margin. This margin is usually smaller than what would be taken with a standard excision. Once the tissue is removed, it is marked and oriented. The first layer (“Stage I”) will be processed in our laboratory. The wound will be treated for bleeding and a bandage will be placed to keep you comfortable while you wait an approximate 45 minutes-1 hour (for the processing of the tissue) in your room. Your Mohs surgeon will examine the pathology in the lab, checking all the margins. If any tumor remains, you will need to take a second layer of skin (“Stage 2”). The area will be re-anesthetized and your Mohs surgeon will remove more skin only in the area where the tumor exists. This process will continue until all the skin cancer is  removed. Unfortunately, there is no method to predict how many layers or stages will be taken.    Once the tumor has been removed completely, we will discuss the best ways to close the defect. Most wounds may be closed with stitches. A larger wound may require a skin graft or a flap. In rare instances, especially for cancers around the eye or for larger cancers, we may work with another surgeon (oculoplastic, ENT, plastics) with special skills to assist with reconstruction.  If the surgery is coordinated with another specialist, you will have the Mohs portion of the procedure first and see the coordinated specialist after the skin cancer has been removed.  Always follow the pre-operative instructions of the surgeon doing the closure.    Medications: Please take all your normal medications the morning of your surgery. If you are a diabetic, please bring your insulin or medications with you, as well as a snack to avoid having low blood sugar during your day with us.    1) Blood Thinners  VERY IMPORTANT: We do NOT stop or hold prescribed blood thinners (such as Coumadin/Warfarin, Plavix, Eliquis, Pradaxa, Brilinta, Apixaban, Xarelto, Lovonox, Rivaroxaban, or Aggrenox) before Mohs surgery. Additionally, If you take aspirin because you have had a stroke, heart attack, heart disease, other condition, or your physician has prescribed you to take it, please continue your aspirin.    Although there is a risk of increased bruising and bleeding, we are still able to safely perform surgery while continuing these medications. Please NEVER stop your prescribed blood thinner without the managing doctor's (the doctor that prescribed the medication) permission or knowledge. If you have any concerns about not holding your blood thinner, please address these with your Mohs surgeon.    Most people should stop all non-steroidal anti-inflammatory medications (Motrin, Naproxen, Advil, Midol, Aleve, etc.) for 7 days prior to your scheduled  surgery and 2 days after (unless instructed otherwise after surgery).  You may take Tylenol for pain.     Vitamins and Supplements  Avoid taking any supplements with Vitamin E, Fish Oil, Gingko, Ginseng, and Garlic for 2 weeks before and 2 days after your surgery. These thin your blood.    Lidocaine Patches  Avoid wearing any over the counter or prescribed Lidocaine patches the day of the surgery.    Alcohol: Avoid drinking alcohol for 2 days prior to your surgery, and for 2 days afterwards (it thins the blood and causes more bruising and swelling).    Smoking: Try to STOP or reduce smoking significantly the week before your surgery, and especially the week afterwards (it greatly improves how well you heal). Tobacco smoke deprives the blood of oxygen, which is urgently needed by the wound during the healing process.    Contact Lenses: Do not wear them on the day of the surgery. Instead, wear glasses and bring your case, in case we need to remove them.    Clothing: Do not wear your nicest clothing on your surgery day. We recommend wearing a button down shirt that will not disrupt your post-operative dressing when changing later that night. Please avoid wearing jeans during the procedure to help prevent damage to our equipment.     Bathing: On the morning of your surgery, you may bathe or shower normally. If you get your hair done on a weekly basis, remember to get your hair washed the day before surgery.   - You will need to keep your surgical site dry for a minimum of 48 hours after surgery.    Makeup: If your surgery is on the face, please do not wear any makeup on the day of the surgery.    Jewelry: Please try to avoid wearing jewelry on the day of surgery.    Food: On the morning of surgery, have breakfast but limit your intake of caffeinated beverages. They are diuretic and may inconvenience you during surgery. If you are following up with another surgeon the same day as your Mohs surgery, you must receive  permission to eat breakfast from that surgeon.    What to bring with you on the day of your surgery:  Bring snacks - Since you could be at the office long, you may bring snacks and/or lunch with you. Some snacks and drinks are available at the office as well.   Bring a sweater - Bring a sweater or jacket that buttons or zips down the front and will not disturb your wound dressing during removal.  Bring something to do - You will be spending much of the day in our office. There will be 45-60 minute waiting periods  between layers/stages, and while there is a television with cable in every room, it is nice to have something to keep you occupied such as books, magazines, knitting, music, or work.     Planning Ahead:  Appointment Length - Understand this procedure length is unpredictable, therefore, plan to be in the office for at least half a day. Depending on procedures scheduled prior to yours, there may be waiting prior to the start of your procedure.  Other Appointments - It is important to realize that no matter how small the skin cancer appears to be, looks can be deceiving. Since your surgery may last the entire day, you should not schedule any other appointments that day.  Special Occasions - Surgery often creates swelling and bruising. Also, the post-op dressing may be rather large and obvious. Keep this in mind as you arrange your social/work schedule. If an important event is already planned, please check with your referring physician or your Mohs surgeon to see if the surgery can be postponed.  Activity Limits after Surgery - If surgery was performed on your face, we recommend that you keep your activity level to a minimum for 2-3 days (the blood pressure elevation related to exercise can lead to bleeding). If you have stitches in an area that will be under tension or significant movement (neck, back, arms, legs), you will need to avoid heavy lifting (anything over 5 lbs) or exercise for at least 2 weeks  and possibly longer. We also advise that you limit out of town travel for the first 7 days after surgery. You should also wait at least 7 days before going into a pool or the ocean.  Housework - Since you will need to minimize activity after surgery, plan to do your groceries, laundry, gardening, and other heavy household chores prior to your surgery. Please make arrangements for assistance during the post-op period. If surgery is around your mouth area, you may need to eat soft foods, such as soup, milkshakes, or yogurt for 48 hours.    Purchasing bandage supplies: Prior to surgery, please purchase the following items to care for your surgical wound properly.  Cotton swabs (Q-tips)  Vaseline or Aquaphor  Telfa pads (or any non-stick dressing)  Paper tape or Hypafix tape  Gauze pads (3x3)    Transportation: It is often reassuring and comforting to have a  drive you to and from the surgery. He or she is welcome to wait in the office during the surgery. If you do not have a , you may drive to and from your procedure (unless stated otherwise). If the site being treated is near your eye, be aware that the final bandage may cause some obstruction of vision.     Rescheduling: If you need to reschedule your surgery, please notify the office as soon as possible.

## 2025-05-06 NOTE — TELEPHONE ENCOUNTER
Pre- operative Mohs Telephone Scheduling Note    Do you have a pacemaker or defibrillator? no    Do you have a cochlear implant, spinal or brain stimulator? no    Do you take antibiotics before skin or dental procedures? yes: amoxicillin  If yes, will likely require pre-operative antibiotics. Ask  the patient why they take the antibiotics (usually because of joint replacement).    Do you have a history of a joint replacements within the past 2 years? no   If yes, will likely require pre-operative antibiotics. Ask if orthopaedic surgeon has prescribed pre-operative antibiotics to take before procedures/dental work?    Do you take any OTC medications that thin your blood (Aspirin, Aleve, Ibuprofen) or supplements that thin your blood (fish oil, garlic, vitamin E, Ginko Biloba)? no    Do you take any prescribed medications that thin your blood (Coumadin, Plavix, Xarelto, Eliquis or another prescribed blood thinner)? no    Do you have an allergy to lidocaine or epinephrine? no    Do you have allergies to Iodine? no    Do you wear a lidocaine patch? no    Have you ever been diagnosed with HIV, AIDS, Hep B and Hep C? no    Do you use a cane, walker or wheelchair? no    Is the patient from a nursing home? no If yes, Is there any special accommodations that is needed for patient n/a    Do you smoke? no      If yes,  patient to try and stop 2 days before surgery and 7 days after the surgery. Minimizing smoking as much as possible during this time will improve healing and the cosmetic result after surgery.    Do you use supplemental oxygen? If so, how many liters and can you be off it for a short period of time? N/a    Date scheduled: 10/22/25 at 11 with Dr Espino     Coordination of Care with other provider (Oculoplastics, Plastics, ENT) required? no   IF YES, PLEASE FORWARD TO APPROPRIATE PERSONNEL TO HELP COORDINATE.    Are there remaining tumors to be scheduled? no    Was Prior Authorization obtained? No (please use  [email or web address] to document prior auth)

## 2025-05-12 ENCOUNTER — RESULTS FOLLOW-UP (OUTPATIENT)
Dept: FAMILY MEDICINE CLINIC | Facility: CLINIC | Age: 65
End: 2025-05-12

## 2025-05-12 DIAGNOSIS — F33.0 MILD EPISODE OF RECURRENT MAJOR DEPRESSIVE DISORDER (HCC): ICD-10-CM

## 2025-05-12 RX ORDER — CITALOPRAM HYDROBROMIDE 40 MG/1
40 TABLET ORAL DAILY
Qty: 90 TABLET | Refills: 1 | Status: SHIPPED | OUTPATIENT
Start: 2025-05-12

## 2025-05-12 NOTE — TELEPHONE ENCOUNTER
Reason for call:   [x] Refill   [] Prior Auth  [] Other:     Office:   [x] PCP/Provider -  Norma Palmer MD   [] Specialty/Provider -     Medication:     citalopram (CeleXA) 40 mg tablet       Dose/Frequency:     40 mg, Oral, Daily       Quantity: 90    Pharmacy[facility] [place] - [address]     Local Pharmacy   Does the patient have enough for 3 days?   [x] Yes   [] No - Send as HP to POD    Mail Away Pharmacy   Does the patient have enough for 10 days?   [] Yes   [] No - Send as HP to POD

## 2025-05-23 ENCOUNTER — TELEPHONE (OUTPATIENT)
Age: 65
End: 2025-05-23

## 2025-05-23 NOTE — TELEPHONE ENCOUNTER
Caller: Patient     Doctor: Dr. Gonzalez    Reason for call: Patient has an upcoming dental procedure and is also having mohs surgery. She will need antibiotics.     Call back#: [phone]

## 2025-05-23 NOTE — TELEPHONE ENCOUNTER
Spoke to patient. She will need this prior to dental procedure but not the mohs procedure. She already has  refill left so she does not need a new script.

## 2025-06-03 ENCOUNTER — TELEPHONE (OUTPATIENT)
Dept: GASTROENTEROLOGY | Facility: CLINIC | Age: 65
End: 2025-06-03

## 2025-06-03 NOTE — TELEPHONE ENCOUNTER
I called & lvm for the patient regarding recall for 3 year repeat colonoscopy with Dr. Conklin. I will send [facility] letter also

## 2025-06-04 ENCOUNTER — HOSPITAL ENCOUNTER (OUTPATIENT)
Dept: MAMMOGRAPHY | Facility: HOSPITAL | Age: 65
Discharge: HOME | End: 2025-06-04
Attending: OBSTETRICS & GYNECOLOGY
Payer: COMMERCIAL

## 2025-06-04 VITALS — WEIGHT: 189 LBS | HEIGHT: 63 IN | BODY MASS INDEX: 33.49 KG/M2

## 2025-06-04 DIAGNOSIS — Z12.31 SCREENING MAMMOGRAM, ENCOUNTER FOR: ICD-10-CM

## 2025-06-04 PROCEDURE — 77063 BREAST TOMOSYNTHESIS BI: CPT

## 2025-06-04 PROCEDURE — 77067 SCR MAMMO BI INCL CAD: CPT

## 2025-06-05 ENCOUNTER — RESULTS FOLLOW-UP (OUTPATIENT)
Dept: LABOR AND DELIVERY | Facility: HOSPITAL | Age: 65
End: 2025-06-05

## 2025-06-06 ENCOUNTER — PREP FOR PROCEDURE (OUTPATIENT)
Age: 65
End: 2025-06-06

## 2025-06-06 ENCOUNTER — TELEPHONE (OUTPATIENT)
Age: 65
End: 2025-06-06

## 2025-06-06 DIAGNOSIS — Z86.0100 HISTORY OF COLON POLYPS: Primary | ICD-10-CM

## 2025-06-06 NOTE — TELEPHONE ENCOUNTER
Scheduled date of colonoscopy (as of today): 9/15/25  Physician performing colonoscopy: Katerin  Location of colonoscopy: [facility]  Bowel prep reviewed with patient: TOMAS/RONAL  Instructions reviewed with patient by: cash  Clearances: na  3 yr recall  Alejandra

## 2025-06-06 NOTE — TELEPHONE ENCOUNTER
06/06/25  Screened by: Carolina Garibay MA    Referring Provider Katerin    Pre- Screening: BMI: 33.48 kg/m²       Has patient been referred for a routine screening Colonoscopy? yes  Is the patient between 45-75 years old? yes      Previous Colonoscopy yes   If yes:    Date: 6/27/2022    Facility:     Reason:       Does the patient want to see a Gastroenterologist prior to their procedure OR are they having any GI symptoms? no    Has the patient been hospitalized or had abdominal surgery in the past 6 months? no    Does the patient use supplemental oxygen? no    Does the patient take Coumadin, Lovenox, Plavix, Elliquis, Xarelto, or other blood thinning medication? no    Has the patient had a stroke, cardiac event, or stent placed in the past year? no

## 2025-06-06 NOTE — LETTER
Attached are your prep instructions for your upcoming procedure on 9/15/25. If you have any questions or concerns please contact us at [phone].    Thank you,     [facility]

## 2025-06-23 ENCOUNTER — PATIENT MESSAGE (OUTPATIENT)
Dept: DERMATOLOGY | Facility: CLINIC | Age: 65
End: 2025-06-23

## 2025-06-27 ENCOUNTER — TELEPHONE (OUTPATIENT)
Age: 65
End: 2025-06-27

## 2025-06-27 ENCOUNTER — OFFICE VISIT (OUTPATIENT)
Dept: FAMILY MEDICINE CLINIC | Facility: CLINIC | Age: 65
End: 2025-06-27
Payer: COMMERCIAL

## 2025-06-27 VITALS
BODY MASS INDEX: 34.73 KG/M2 | RESPIRATION RATE: 18 BRPM | HEART RATE: 88 BPM | OXYGEN SATURATION: 99 % | HEIGHT: 63 IN | SYSTOLIC BLOOD PRESSURE: 120 MMHG | DIASTOLIC BLOOD PRESSURE: 70 MMHG | WEIGHT: 196 LBS | TEMPERATURE: 98.1 F

## 2025-06-27 DIAGNOSIS — Z23 ENCOUNTER FOR IMMUNIZATION: ICD-10-CM

## 2025-06-27 DIAGNOSIS — F32.0 CURRENT MILD EPISODE OF MAJOR DEPRESSIVE DISORDER WITHOUT PRIOR EPISODE (HCC): ICD-10-CM

## 2025-06-27 DIAGNOSIS — Z00.00 ANNUAL PHYSICAL EXAM: Primary | ICD-10-CM

## 2025-06-27 DIAGNOSIS — E66.9 OBESITY (BMI 30-39.9): ICD-10-CM

## 2025-06-27 DIAGNOSIS — R10.13 ABDOMINAL DISCOMFORT, EPIGASTRIC: ICD-10-CM

## 2025-06-27 DIAGNOSIS — F51.01 PRIMARY INSOMNIA: ICD-10-CM

## 2025-06-27 DIAGNOSIS — K21.9 GASTROESOPHAGEAL REFLUX DISEASE, UNSPECIFIED WHETHER ESOPHAGITIS PRESENT: ICD-10-CM

## 2025-06-27 DIAGNOSIS — F41.9 ANXIETY: ICD-10-CM

## 2025-06-27 PROBLEM — M24.152 DEGENERATIVE TEAR OF ACETABULAR LABRUM OF LEFT HIP: Status: RESOLVED | Noted: 2019-11-01 | Resolved: 2025-06-27

## 2025-06-27 PROBLEM — M77.11 LATERAL EPICONDYLITIS OF RIGHT ELBOW: Status: RESOLVED | Noted: 2019-02-13 | Resolved: 2025-06-27

## 2025-06-27 PROCEDURE — 99214 OFFICE O/P EST MOD 30 MIN: CPT | Performed by: FAMILY MEDICINE

## 2025-06-27 PROCEDURE — 90677 PCV20 VACCINE IM: CPT

## 2025-06-27 PROCEDURE — 99396 PREV VISIT EST AGE 40-64: CPT | Performed by: FAMILY MEDICINE

## 2025-06-27 PROCEDURE — 90471 IMMUNIZATION ADMIN: CPT

## 2025-06-27 RX ORDER — PANTOPRAZOLE SODIUM 40 MG/1
40 TABLET, DELAYED RELEASE ORAL DAILY
Qty: 30 TABLET | Refills: 0 | Status: SHIPPED | OUTPATIENT
Start: 2025-06-27

## 2025-06-27 RX ORDER — ESZOPICLONE 2 MG/1
2 TABLET, FILM COATED ORAL
Qty: 30 TABLET | Refills: 0 | Status: SHIPPED | OUTPATIENT
Start: 2025-06-27

## 2025-06-27 NOTE — PATIENT INSTRUCTIONS
"Patient Education     Routine physical for adults   The Basics   Written by the doctors and editors at [facility]   What is a physical? -- A physical is a routine visit, or \"check-up,\" with your doctor. You might also hear it called a \"wellness visit\" or \"preventive visit.\"  During each visit, the doctor will:   Ask about your physical and mental health   Ask about your habits, behaviors, and lifestyle   Do an exam   Give you vaccines if needed   Talk to you about any medicines you take   Give advice about your health   Answer your questions  Getting regular check-ups is an important part of taking care of your health. It can help your doctor find and treat any problems you have. But it's also important for preventing health problems.  A routine physical is different from a \"sick visit.\" A sick visit is when you see a doctor because of a health concern or problem. Since physicals are scheduled ahead of time, you can think about what you want to ask the doctor.  How often should I get a physical? -- It depends on your age and health. In general, for people age 21 years and older:   If you are younger than 50 years, you might be able to get a physical every 3 years.   If you are 50 years or older, your doctor might recommend a physical every year.  If you have an ongoing health condition, like diabetes or high blood pressure, your doctor will probably want to see you more often.  What happens during a physical? -- In general, each visit will include:   Physical exam - The doctor or nurse will check your height, weight, heart rate, and blood pressure. They will also look at your eyes and ears. They will ask about how you are feeling and whether you have any symptoms that bother you.   Medicines - It's a good idea to bring a list of all the medicines you take to each doctor visit. Your doctor will talk to you about your medicines and answer any questions. Tell them if you are having any side effects that bother you. You " "should also tell them if you are having trouble paying for any of your medicines.   Habits and behaviors - This includes:   Your diet   Your exercise habits   Whether you smoke, drink alcohol, or use drugs   Whether you are sexually active   Whether you feel safe at home  Your doctor will talk to you about things you can do to improve your health and lower your risk of health problems. They will also offer help and support. For example, if you want to quit smoking, they can give you advice and might prescribe medicines. If you want to improve your diet or get more physical activity, they can help you with this, too.   Lab tests, if needed - The tests you get will depend on your age and situation. For example, your doctor might want to check your:   Cholesterol   Blood sugar   Iron level   Vaccines - The recommended vaccines will depend on your age, health, and what vaccines you already had. Vaccines are very important because they can prevent certain serious or deadly infections.   Discussion of screening - \"Screening\" means checking for diseases or other health problems before they cause symptoms. Your doctor can recommend screening based on your age, risk, and preferences. This might include tests to check for:   Cancer, such as breast, prostate, cervical, ovarian, colorectal, prostate, lung, or skin cancer   Sexually transmitted infections, such as chlamydia and gonorrhea   Mental health conditions like depression and anxiety  Your doctor will talk to you about the different types of screening tests. They can help you decide which screenings to have. They can also explain what the results might mean.   Answering questions - The physical is a good time to ask the doctor or nurse questions about your health. If needed, they can refer you to other doctors or specialists, too.  Adults older than 65 years often need other care, too. As you get older, your doctor will talk to you about:   How to prevent falling at " home   Hearing or vision tests   Memory testing   How to take your medicines safely   Making sure that you have the help and support you need at home  All topics are updated as new evidence becomes available and our peer review process is complete.  This topic retrieved from [employer] on: May 02, 2024.  Topic 406585 Version 1.0  Release: 32.4.3 - C32.122  © 2024 UpToDate, Inc. and/or its affiliates. All rights reserved.  Consumer Information Use and Disclaimer   Disclaimer: This generalized information is a limited summary of diagnosis, treatment, and/or medication information. It is not meant to be comprehensive and should be used as a tool to help the user understand and/or assess potential diagnostic and treatment options. It does NOT include all information about conditions, treatments, medications, side effects, or risks that may apply to a specific patient. It is not intended to be medical advice or a substitute for the medical advice, diagnosis, or treatment of a health care provider based on the health care provider's examination and assessment of a patient's specific and unique circumstances. Patients must speak with a health care provider for complete information about their health, medical questions, and treatment options, including any risks or benefits regarding use of medications. This information does not endorse any treatments or medications as safe, effective, or approved for treating a specific patient. UpToDate, Inc. and its affiliates disclaim any warranty or liability relating to this information or the use thereof.The use of this information is governed by the Terms of Use, available at https://www.woltersShopRunneruwer.com/en/know/clinical-effectiveness-terms. 2024© UpToDate, Inc. and its affiliates and/or licensors. All rights reserved.  Copyright   © 2024 UpToDate, Inc. and/or its affiliates. All rights reserved.

## 2025-06-27 NOTE — ASSESSMENT & PLAN NOTE
Depression Screening Follow-up Plan: Patient's depression screening was positive with a PHQ-9 score of 5. Patient with underlying depression and was advised to continue current medications as prescribed.    Continue wellbutrin as directed

## 2025-06-27 NOTE — PROGRESS NOTES
Adult Annual Physical  Name: Charlotte Portillo      : 1960      MRN: [id number]  Encounter Provider: Norma Palmer MD  Encounter Date: 2025   Encounter department: [facility]    :  Assessment & Plan  Annual physical exam         Anxiety  Stable on celexa       Current mild episode of major depressive disorder without prior episode (HCC)  Depression Screening Follow-up Plan: Patient's depression screening was positive with a PHQ-9 score of 5. Patient with underlying depression and was advised to continue current medications as prescribed.    Continue wellbutrin as directed           Primary insomnia  Trial of lunesta    Orders:    eszopiclone (LUNESTA) 2 mg tablet; Take 1 tablet (2 mg total) by mouth daily at bedtime as needed for sleep Take immediately before bedtime    Gastroesophageal reflux disease, unspecified whether esophagitis present    Orders:    pantoprazole (PROTONIX) 40 mg tablet; Take 1 tablet (40 mg total) by mouth daily    Abdominal discomfort, epigastric    Orders:    pantoprazole (PROTONIX) 40 mg tablet; Take 1 tablet (40 mg total) by mouth daily    Obesity (BMI 30-39.9)  Prior Authorization Clinical Questions for Weight Management Pharmacotherapy    1. Does the patient have a contrainidcation to medication prescribed for weight management?: No  2. Does the patient have a diagnosis of obesity, confirmed by a BMI greater than or equal to 30 kg/m^2?: Yes  3. Does the patient have a BMI of greater than or equal to 27 kg/m^2 with at least one weight-related comorbidity/risk factor/complication (e.g. diabetes, dyslipidemia, coronary artery disease)?: Yes  5. WEGOVY CVA Indication: Does patient have established documented cardiovascular disease (history of a prior heart attack (myocardial infarction), stroke, or symptomatic peripheral arterial disease (PAD)?: N/A  6. ZEPBOUND JEREMIAH Indication: Does patient have documented JEREMIAH diagnosed via sleep study (insurance will require  copy of sleep study results for approval)?: N/A  7. Has the patient been on a weight loss regimen of low-calorie diet, increased physical activity, and lifestyle modifications for a minimum of 6 months?: Yes  8. Has the patient completed a comprehensive weight loss program (ie, Weight Watchers, Noom, Bariatrics, other demetra on phone)? If so, what?: No  9. Does the patient have a history of type 2 diabetes?: No  10. Has the member tried and failed other weight loss medication within the past 12 months?: No  11. Will the member use requested medication in combination with another GLP agonist or weight loss drug?: No  12. Is the medication a controlled substance?: No     Baseline weight (in pounds): 196 lbs  Current weight (in pounds): 196 lbs  Weight loss percentage: 0%     Diet, exercise and portion control   Myfitness pal to track her calories   < 1500 praveen/day  Protein 70-80 grams /day   Trial of wegovy     Orders:    Semaglutide-Weight Management (WEGOVY) 0.25 MG/0.5ML; Inject 0.5 mL (0.25 mg total) under the skin once a week for 28 days    Encounter for immunization    Orders:    Pneumococcal Conjugate Vaccine 20-valent (Pcv20)        Preventive Screenings:  - Diabetes Screening: screening up-to-date  - Cholesterol Screening: screening not indicated and has hyperlipidemia   - Hepatitis C screening: screening up-to-date   - HIV screening: screening up-to-date   - Breast cancer screening: screening up-to-date   - Colon cancer screening: screening up-to-date   - Lung cancer screening: screening not indicated     Immunizations:  - Immunizations due: Prevnar 20    Counseling/Anticipatory Guidance:  - Alcohol: discussed moderation in alcohol intake and recommendations for healthy alcohol use.   - Dental health: discussed importance of regular tooth brushing, flossing, and dental visits.   - Sexual health: discussed sexually transmitted diseases, partner selection, use of condoms, avoidance of unintended pregnancy, and  contraceptive alternatives.   - Diet: discussed recommendations for a healthy/well-balanced diet.   - Exercise: the importance of regular exercise/physical activity was discussed. Recommend exercise 3-5 times per week for at least 30 minutes.   - Injury prevention: discussed safety/seat belts, safety helmets, smoke detectors, carbon monoxide detectors, and smoking near bedding or upholstery.       Depression Screening and Follow-up Plan: Patient's depression screening was positive with a PHQ-9 score of 5.   Patient with underlying depression and was advised to continue current medications as prescribed.         History of Present Illness     Stopped drinking alcohol 10 days ago  Now she has insomnia   Melatonin gives her hangover feeling      Adult Annual Physical:  Patient presents for annual physical.     Diet and Physical Activity:  - Diet/Nutrition: well balanced diet, consuming 3-5 servings of fruits/vegetables daily, adequate whole grain intake, adequate fiber intake, limited junk food, low fat diet and low carb diet.  - Exercise: walking, moderate cardiovascular exercise, 1-2 times a week on average and 30-60 minutes on average.    Depression Screening:    - PHQ-9 Score: 5    General Health:  - Sleep: sleeps poorly and 7-8 hours of sleep on average.  - Hearing: normal hearing bilateral ears.  - Vision: wears glasses, no vision problems and most recent eye exam < 1 year ago.  - Dental: regular dental visits, floss regularly and brushes teeth twice daily.    /GYN Health:  - Follows with GYN: yes.   - Menopause: postmenopausal.   - History of STDs: yes  - Contraception: menopause and tubal ligation.      Advanced Care Planning:  - Has an advanced directive?: yes    - Has a durable medical POA?: yes    - ACP document given to patient?: no      Review of Systems   Constitutional: Negative.    HENT: Negative.     Eyes: Negative.    Respiratory: Negative.     Endocrine: Negative.    Genitourinary: Negative.   "  Allergic/Immunologic: Negative.    Neurological: Negative.    Hematological: Negative.    Psychiatric/Behavioral: Negative.       Medical History Reviewed by provider this encounter:  Problems     .  Past Medical History   Past Medical History[1]  Past Surgical History[2]  Family History[3]   reports that she quit smoking about 40 years ago. Her smoking use included cigarettes. She has never been exposed to tobacco smoke. She has never used smokeless tobacco. She reports current alcohol use. She reports that she does not use drugs.  Current Outpatient Medications   Medication Instructions    buPROPion (WELLBUTRIN XL) 150 mg, Oral, Every morning    CALCIUM  mg, Daily    citalopram (CELEXA) 40 mg, Oral, Daily    eszopiclone (LUNESTA) 2 mg, Oral, Daily at bedtime PRN, Take immediately before bedtime    Multiple Vitamin (MULTI-VITAMIN DAILY PO) No dose, route, or frequency recorded.    pantoprazole (PROTONIX) 40 mg, Oral, Daily    pramipexole (MIRAPEX) 0.25 mg tablet 1 1/2 tabs daily    Restasis 0.05 % ophthalmic emulsion 1 drop, Every 12 hours    Semaglutide-Weight Management (WEGOVY) 0.25 mg, Subcutaneous, Weekly   Allergies[4]   Medications Ordered Prior to Encounter[5]   Social History[6]    Objective   /70 (BP Location: Left arm, Patient Position: Sitting, Cuff Size: Standard)   Pulse 88   Temp 98.1 °F (36.7 °C) (Tympanic)   Resp 18   Ht 5' 3.27\" (1.607 m)   Wt 88.9 kg (196 lb)   SpO2 99%   BMI 34.43 kg/m²     Physical Exam  Vitals and nursing note reviewed.   Constitutional:       Appearance: Normal appearance.   HENT:      Head: Normocephalic and atraumatic.      Right Ear: Tympanic membrane normal.      Left Ear: Tympanic membrane normal.      Mouth/Throat:      Mouth: Mucous membranes are moist.      Pharynx: No oropharyngeal exudate.     Eyes:      Extraocular Movements: Extraocular movements intact.      Pupils: Pupils are equal, round, and reactive to light.       Cardiovascular:      " Rate and Rhythm: Normal rate and regular rhythm.      Pulses: Normal pulses.      Heart sounds: Normal heart sounds.   Pulmonary:      Effort: Pulmonary effort is normal.      Breath sounds: Normal breath sounds.   Abdominal:      Palpations: Abdomen is soft.     Musculoskeletal:         General: Normal range of motion.      Cervical back: Normal range of motion.     Skin:     Capillary Refill: Capillary refill takes less than 2 seconds.     Neurological:      General: No focal deficit present.      Mental Status: She is alert and oriented to person, place, and time.     Psychiatric:         Mood and Affect: Mood normal.         Behavior: Behavior normal.              [1]   Past Medical History:  Diagnosis Date    Anxiety     Arthritis     Basal cell carcinoma 2022    Right shoulder, excision     Cancer (HCC)     Depression     Melanoma (HCC) 2018    Left upper arm    PONV (postoperative nausea and vomiting)     Primary localized osteoarthritis of left hip 10/31/2018   [2]   Past Surgical History:  Procedure Laterality Date     SECTION       SECTION  1994    COLONOSCOPY  2022    3 years    FL INJECTION LEFT HIP (ARTHROGRAM)  10/24/2018    FL INJECTION LEFT HIP (NON ARTHROGRAM)  2018    FL INJECTION LEFT HIP (NON ARTHROGRAM)  2019    FL INJECTION LEFT HIP (NON ARTHROGRAM)  2019    FL INJECTION LEFT HIP (NON ARTHROGRAM)  2019    FL INJECTION LEFT HIP (NON ARTHROGRAM)  2020    FL INJECTION LEFT HIP (NON ARTHROGRAM)  2020    FL INJECTION LEFT HIP (NON ARTHROGRAM)  2021    KNEE ARTHROSCOPY      NH ARTHRP ACETBLR/PROX FEM PROSTC AGRFT/ALGRFT Left 2021    Procedure: ARTHROPLASTY HIP TOTAL ANTERIOR;  Surgeon: Trisha Gonzalez MD;  Location: [facility];  Service: Orthopedics    ROTATOR CUFF REPAIR      SHOULDER SURGERY      SKIN BIOPSY  2022    WISDOM TOOTH EXTRACTION     [3]   Family History  Problem Relation Name Age of Onset     Arthritis Mother      Hypertension Mother      Scoliosis Mother      Hyperlipidemia Mother      Heart attack Mother      Arthritis Father      Heart disease Father          CARDIAC DISORDER    Heart attack Father      Diabetes Sister          borderline    No Known Problems Sister      Alzheimer's disease Maternal Grandmother      Stroke Maternal Grandfather      No Known Problems Paternal Grandmother      No Known Problems Paternal Grandfather      No Known Problems Brother      No Known Problems Brother      No Known Problems Son      No Known Problems Son      No Known Problems Son      Birth defects Maternal Aunt      Ovarian cancer Maternal Aunt  50    Breast cancer Maternal Aunt  70    Heart attack Maternal Aunt      Birth defects Paternal Aunt      Lung cancer Paternal Aunt  60    Breast cancer Paternal Cousin      Breast cancer Paternal Cousin      Breast cancer Paternal Cousin     [4]   Allergies  Allergen Reactions    Monistat [Tioconazole] Other (See Comments)     Severe vaginal burning    Benzoin Rash    Meloxicam Rash    Nickel Itching and Rash   [5]   Current Outpatient Medications on File Prior to Visit   Medication Sig Dispense Refill    buPROPion (WELLBUTRIN XL) 150 mg 24 hr tablet Take 1 tablet (150 mg total) by mouth every morning 30 tablet 5    CALCIUM PO Take 600 mg by mouth in the morning      citalopram (CeleXA) 40 mg tablet Take 1 tablet (40 mg total) by mouth daily 90 tablet 1    Multiple Vitamin (MULTI-VITAMIN DAILY PO)       pramipexole (MIRAPEX) 0.25 mg tablet 1 1/2 tabs daily 135 tablet 3    Restasis 0.05 % ophthalmic emulsion Administer 1 drop to both eyes every 12 (twelve) hours      [DISCONTINUED] pantoprazole (PROTONIX) 40 mg tablet Take 1 tablet (40 mg total) by mouth daily 30 tablet 0    [DISCONTINUED] sucralfate (CARAFATE) 1 g/10 mL suspension Take 10 mL (1 g total) by mouth 2 (two) times a day (Patient not taking: Reported on 6/27/2025) 414 mL 0     No current  facility-administered medications on file prior to visit.   [6]   Social History  Tobacco Use    Smoking status: Former     Current packs/day: 0.00     Types: Cigarettes     Quit date:      Years since quittin.5     Passive exposure: Never    Smokeless tobacco: Never   Vaping Use    Vaping status: Never Used   Substance and Sexual Activity    Alcohol use: Yes     Comment: weekends    Drug use: No    Sexual activity: Not Currently     Birth control/protection: Post-menopausal, Abstinence     Comment:

## 2025-06-27 NOTE — ASSESSMENT & PLAN NOTE
Prior Authorization Clinical Questions for Weight Management Pharmacotherapy    1. Does the patient have a contrainidcation to medication prescribed for weight management?: No  2. Does the patient have a diagnosis of obesity, confirmed by a BMI greater than or equal to 30 kg/m^2?: Yes  3. Does the patient have a BMI of greater than or equal to 27 kg/m^2 with at least one weight-related comorbidity/risk factor/complication (e.g. diabetes, dyslipidemia, coronary artery disease)?: Yes  5. WEGOVY CVA Indication: Does patient have established documented cardiovascular disease (history of a prior heart attack (myocardial infarction), stroke, or symptomatic peripheral arterial disease (PAD)?: N/A  6. ZEPBOUND JEREMIAH Indication: Does patient have documented JEREMIAH diagnosed via sleep study (insurance will require copy of sleep study results for approval)?: N/A  7. Has the patient been on a weight loss regimen of low-calorie diet, increased physical activity, and lifestyle modifications for a minimum of 6 months?: Yes  8. Has the patient completed a comprehensive weight loss program (ie, Weight Watchers, Noom, Bariatrics, other demetra on phone)? If so, what?: No  9. Does the patient have a history of type 2 diabetes?: No  10. Has the member tried and failed other weight loss medication within the past 12 months?: No  11. Will the member use requested medication in combination with another GLP agonist or weight loss drug?: No  12. Is the medication a controlled substance?: No     Baseline weight (in pounds): 196 lbs  Current weight (in pounds): 196 lbs  Weight loss percentage: 0%     Diet, exercise and portion control   Myfitness pal to track her calories   < 1500 praveen/day  Protein 70-80 grams /day   Trial of wegovy     Orders:    Semaglutide-Weight Management (WEGOVY) 0.25 MG/0.5ML; Inject 0.5 mL (0.25 mg total) under the skin once a week for 28 days

## 2025-06-27 NOTE — TELEPHONE ENCOUNTER
PA for Semaglutide-Weight Management (WEGOVY) 0.25 MG/0.5ML SUBMITTED to [facility]    via    []CMM-KEY:    [x]Surescripts-Case ID #  [id number]   []Availity-Auth ID #  NDC #    []Faxed to plan   []Other website    []Phone call Case ID #      [x]PA sent as URGENT    All office notes, labs and other pertaining documents and studies sent. Clinical questions answered. Awaiting determination from insurance company.     Turnaround time for your insurance to make a decision on your Prior Authorization can take 7-21 business days.

## 2025-06-30 NOTE — TELEPHONE ENCOUNTER
PA for     Semaglutide-Weight Management (WEGOVY) 0.25 MG/0.5ML   APPROVED     Date(s) approved 6/27/2025 - 6/27/2026    Case # [id number]     Patient advised by          [][employer] Message  []Phone call   [x]LMOM  []L/M to call office as no active Communication consent on file  []Unable to leave detailed message as VM not approved on Communication consent       Pharmacy advised by    [x]Fax  []Phone call  []Secure Chat    Specialty Pharmacy    []      Approval letter scanned into [employer] No no letter available at time of approval.

## 2025-07-15 ENCOUNTER — TELEPHONE (OUTPATIENT)
Age: 65
End: 2025-07-15

## 2025-07-21 DIAGNOSIS — F51.01 PRIMARY INSOMNIA: ICD-10-CM

## 2025-07-21 DIAGNOSIS — E66.9 OBESITY (BMI 30-39.9): Primary | ICD-10-CM

## 2025-07-21 RX ORDER — HYDROXYZINE HYDROCHLORIDE 25 MG/1
25 TABLET, FILM COATED ORAL
Qty: 30 TABLET | Refills: 0 | Status: SHIPPED | OUTPATIENT
Start: 2025-07-21

## 2025-07-21 RX ORDER — SEMAGLUTIDE 0.5 MG/.5ML
INJECTION, SOLUTION SUBCUTANEOUS
Qty: 2 ML | Refills: 0 | Status: SHIPPED | OUTPATIENT
Start: 2025-07-21

## 2025-07-28 ENCOUNTER — TELEPHONE (OUTPATIENT)
Dept: OTHER | Facility: HOSPITAL | Age: 65
End: 2025-07-28

## 2025-08-12 ENCOUNTER — ANESTHESIA EVENT (OUTPATIENT)
Dept: ANESTHESIOLOGY | Facility: HOSPITAL | Age: 65
End: 2025-08-12

## 2025-08-12 ENCOUNTER — ANESTHESIA (OUTPATIENT)
Dept: ANESTHESIOLOGY | Facility: HOSPITAL | Age: 65
End: 2025-08-12

## (undated) DEVICE — CHLORAPREP HI-LITE 26ML ORANGE

## (undated) DEVICE — 3M™ TEGADERM™ TRANSPARENT FILM DRESSING FRAME STYLE, 1628, 6 IN X 8 IN (15 CM X 20 CM), 10/CT 8CT/CASE: Brand: 3M™ TEGADERM™

## (undated) DEVICE — SUT MONOCRYL 3-0 PS-2 18 IN Y497G

## (undated) DEVICE — HEAVY DUTY TABLE COVER: Brand: CONVERTORS

## (undated) DEVICE — 3M™ IOBAN™ 2 ANTIMICROBIAL INCISE DRAPE 6650EZ: Brand: IOBAN™ 2

## (undated) DEVICE — PAD GROUNDING ADULT

## (undated) DEVICE — 40601 PROLONGED POSITIONING SYSTEM: Brand: 40601 PROLONGED POSITIONING SYSTEM

## (undated) DEVICE — THE SIMPULSE SOLO SYSTEM WITH ULTREX RETRACTABLE SPLASH SHIELD TIP: Brand: SIMPULSE SOLO

## (undated) DEVICE — LIGHT HANDLE COVER SLEEVE DISP BLUE STELLAR

## (undated) DEVICE — PENCIL ELECTROSURG E-Z CLEAN -0035H

## (undated) DEVICE — SPONGE PVP SCRUB WING STERILE

## (undated) DEVICE — OSCILLATING TIP SAW CARTRIDGE: Brand: PRECISION FALCON

## (undated) DEVICE — GAUZE SPONGES,16 PLY: Brand: CURITY

## (undated) DEVICE — DRAPE CAMERA/LASER

## (undated) DEVICE — DISPOSABLE EQUIPMENT COVER: Brand: SMALL TOWEL DRAPE

## (undated) DEVICE — SUT VICRYL 2-0 CT-1 27 IN J259H

## (undated) DEVICE — ELECTRODE BLADE E-Z CLEAN 4IN -0014A

## (undated) DEVICE — NEEDLE 22 G X 1 1/2 SAFETY

## (undated) DEVICE — BETHLEHEM UNIV MAJOR ORTHO,KIT: Brand: CARDINAL HEALTH

## (undated) DEVICE — DRAPE SHEET THREE QUARTER

## (undated) DEVICE — CAPIT HIP UPCHRG  GRIPTION CUP

## (undated) DEVICE — IMPERVIOUS STOCKINETTE: Brand: DEROYAL

## (undated) DEVICE — DRAPE C-ARM X-RAY

## (undated) DEVICE — GLOVE INDICATOR PI UNDERGLOVE SZ 8.5 BLUE

## (undated) DEVICE — SUT STRATAFIX SPIRAL PDS PLUS 1 CTX 18 IN SXPP1A400

## (undated) DEVICE — SYRINGE 20ML LL

## (undated) DEVICE — ADHESIVE SKIN HIGH VISCOSITY EXOFIN 1ML

## (undated) DEVICE — CAPIT HIP COP - ACTIS ONLY

## (undated) DEVICE — HOOD: Brand: FLYTE, SURGICOOL

## (undated) DEVICE — GLOVE SRG BIOGEL 8